# Patient Record
Sex: FEMALE | Race: WHITE | Employment: OTHER | URBAN - NONMETROPOLITAN AREA
[De-identification: names, ages, dates, MRNs, and addresses within clinical notes are randomized per-mention and may not be internally consistent; named-entity substitution may affect disease eponyms.]

---

## 2017-10-05 ENCOUNTER — HOSPITAL ENCOUNTER (EMERGENCY)
Age: 77
Discharge: HOME | End: 2017-10-05
Payer: MEDICARE

## 2017-10-05 VITALS
RESPIRATION RATE: 20 BRPM | HEART RATE: 78 BPM | OXYGEN SATURATION: 98 % | SYSTOLIC BLOOD PRESSURE: 134 MMHG | TEMPERATURE: 97.8 F | DIASTOLIC BLOOD PRESSURE: 74 MMHG

## 2017-10-05 DIAGNOSIS — S76.302A HAMSTRING INJURY, LEFT, INITIAL ENCOUNTER: Primary | ICD-10-CM

## 2017-10-05 PROCEDURE — 99202 OFFICE O/P NEW SF 15 MIN: CPT | Performed by: NURSE PRACTITIONER

## 2017-10-05 PROCEDURE — 99212 OFFICE O/P EST SF 10 MIN: CPT

## 2017-10-05 ASSESSMENT — PAIN DESCRIPTION - ORIENTATION: ORIENTATION: LEFT;UPPER

## 2017-10-05 ASSESSMENT — PAIN SCALES - GENERAL: PAINLEVEL_OUTOF10: 9

## 2017-10-05 ASSESSMENT — PAIN DESCRIPTION - DESCRIPTORS: DESCRIPTORS: SHARP;SQUEEZING;ACHING

## 2017-10-05 ASSESSMENT — PAIN DESCRIPTION - PAIN TYPE: TYPE: ACUTE PAIN

## 2017-10-05 ASSESSMENT — PAIN DESCRIPTION - LOCATION: LOCATION: LEG

## 2017-10-05 NOTE — ED PROVIDER NOTES
[address]  Urgent Care Encounter       CHIEF COMPLAINT       Chief Complaint   Patient presents with    Leg Injury     today at 1100       Nurses Notes reviewed and I agree except as noted in the HPI. HISTORY OF PRESENT ILLNESS   Dior Colon is a 65 y.o. female who presents To the urgent care center complaining of pain to the left upper leg. Patient describes the pain as squeezing aching sharp states she has pain 9 out of 10 with movement the patient states that she fell today while carrying laundry and a piece a laundry fell and the patient slipped on it hurting her posterior left leg today. This happened in the kitchen of her home around 11 AM which would've been approximately one and one half hours prior to arrival.    Patient is a 65 y.o. female presenting with leg problem. The history is provided by the patient. Leg Injury   Location:  Leg  Time since incident:  1 hour  Injury: yes    Mechanism of injury comment:  Nba Minor in the kitchen today  Leg location:  L upper leg  Pain details:     Quality:  Sharp and aching (squeezing)    Radiates to:  Does not radiate    Severity:  Severe    Onset quality:  Sudden    Duration:  1 day    Timing:  Constant    Progression:  Unchanged  Chronicity:  New  Associated symptoms: decreased ROM    Associated symptoms: no neck pain        REVIEW OF SYSTEMS     Review of Systems   Constitutional: Positive for activity change. Musculoskeletal: Positive for arthralgias, gait problem and myalgias. Negative for neck pain and neck stiffness. Left leg       PAST MEDICAL HISTORY         Diagnosis Date    Anti-phospholipid antibody syndrome ([facility]     Cancer ([facility]     uterine        SURGICAL HISTORY     Patient  has a past surgical history that includes Hysterectomy; Foot surgery; and eye surgery (Bilateral).     CURRENT MEDICATIONS       Discharge Medication List as of 10/5/2017  1:03 PM      CONTINUE these medications which have NOT CHANGED Details   B Complex Vitamins (VITAMIN B COMPLEX PO) Take by mouthHistorical Med      Multiple Vitamins-Minerals (OCUVITE PRESERVISION PO) Take by mouthHistorical Med      TRAZODONE HCL PO Take 50 mg by mouthHistorical Med      prednisoLONE acetate (PRED FORTE) 1 % ophthalmic suspension 1 drop 4 times daily. multivitamin (THERAGRAN) per tablet Take 1 tablet by mouth daily. FISH OIL by Does not apply route. Calcium Carbonate-Vitamin D (CALCIUM + D) 600-200 MG-UNIT TABS Take  by mouth. aspirin 325 MG tablet Take 325 mg by mouth daily. ALLERGIES     Patient is has No Known Allergies. Patients   Immunization History   Administered Date(s) Administered    Td 09/24/2012       FAMILY HISTORY     Patient's family history is not on file. SOCIAL HISTORY     Patient  reports that she has never smoked. She does not have any smokeless tobacco history on file. She reports that she does not use illicit drugs. PHYSICAL EXAM     ED TRIAGE VITALS  BP: 134/74, Temp: 97.8 °F (36.6 °C), Pulse: 78, Resp: 20, SpO2: 98 %,Estimated body mass index is 28.13 kg/(m^2) as calculated from the following:    Height as of 9/24/12: 5' 8\" (1.727 m). Weight as of 9/24/12: 185 lb (83.9 kg). ,No LMP recorded. Patient is postmenopausal.    Physical Exam   Constitutional: She is oriented to person, place, and time. Vital signs are normal. She appears well-developed and well-nourished. She is cooperative. Non-toxic appearance. She does not have a sickly appearance. She does not appear ill. No distress. HENT:   Head: Normocephalic. Right Ear: External ear normal.   Left Ear: External ear normal.   Nose: Nose normal.   Neck: Normal range of motion and full passive range of motion without pain. Neck supple. Cardiovascular: Normal rate. Pulmonary/Chest: Effort normal.   Musculoskeletal: She exhibits edema and tenderness. Left upper leg: She exhibits tenderness.         Legs:  Patient was able

## 2017-10-05 NOTE — ED AVS SNAPSHOT
your survey in the envelope provided. We hope you will choose us in the future for your healthcare needs. Patient Information     Patient Name CHAVA Sosa 1940      Care Provided at:     Name Address Phone       [address] [phone]            Your Visit    Here you will find information about your visit, including the reason for your visit. Please take this sheet with you when you visit your doctor or other health care provider in the future. It will help determine the best possible medical care for you at that time. If you have any questions once you leave the hospital, please call the department phone number listed below. Diagnoses this visit     Your diagnosis was HAMSTRING INJURY, LEFT, INITIAL ENCOUNTER. Visit Information     Date of Visit Department Dept Phone    10/5/2017 [address] [place] [address] [phone]      You were seen by     You were seen by Yina Escalona NP. Follow-up Appointments    Below is a list of your follow-up and future appointments. This may not be a complete list as you may have made appointments directly with providers that we are not aware of or your providers may have made some for you. Please call your providers to confirm appointments. It is important to keep your appointments. Please bring your current insurance card, photo ID, co-pay, and all medication bottles to your appointment. If self-pay, payment is expected at the time of service. Follow-up Information     Follow up with Jo Esposito MD In 1 week.     Specialty:  Internal Medicine    Why:  For recheck and further evaluation and management, If symptoms worsen go to the Emergency Department    Contact information:    [address]  [phone]        Preventive Care        Date Due 4. Enter your Social Security Number ([id number]) and Date of Birth (mm/dd/yyyy) as indicated and click Submit. You will be taken to the next sign-up page. 5. Create a EasyQasat ID. This will be your EasyQasat login ID and cannot be changed, so think of one that is secure and easy to remember. 6. Create a EasyQasat password. You can change your password at any time. 7. Enter your Password Reset Question and Answer. This can be used at a later time if you forget your password. 8. Enter your e-mail address. You will receive e-mail notification when new information is available in [address] 9. Click Sign Up. You can now view your medical record. Additional Information  If you have questions, please contact the physician practice where you receive care. Remember, [employer]Drive[employer] is NOT to be used for urgent needs. For medical emergencies, dial 911. For questions regarding your FreeDrivehart account call [phone]. If you have a clinical question, please call your doctor's office. View your information online  ? Review your current list of  medications, immunization, and allergies. ? Review your future test results online . ? Review your discharge instructions provided by your caregivers at discharge    Certain functionality such as prescription refills, scheduling appointments or sending messages to your provider are not activated if your provider does not use [employer] [facility] in his/her office    For questions regarding your MyChart account call [phone]. If you have a clinical question, please call your doctor's office. The information on all pages of the After Visit Summary has been reviewed with me, the patient and/or responsible adult, by my health care provider(s). I had the opportunity to ask questions regarding this information. I understand I should dispose of my armband safely at home to protect my health information.  A complete copy of the After Visit Summary has been given to me, the patient and/or responsible adult. Patient Signature/Responsible Adult: ___________________________________    Nurse Signature: ___________________________________  Resident/MLP Signature: ___________________________________  Attending Signature: ___________________________________    Date:____________Time:____________              More Information           Hamstring Strain: Care Instructions  Your Care Instructions    A hamstring strain happens when you overstretch, or pull, the muscles that run down the back of your thigh. It can happen when you exercise or lift something or if you're injured in an accident. You may feel pain and tenderness that's worse when you move your injured leg. The back of your thigh may be swollen and bruised. If you have a bad strain, you may not be able to move your leg normally. While a minor strain often heals well with rest and other treatment, a severe strain may require medical treatment. If a severe strain isn't treated, you may have long-lasting problems. Follow-up care is a key part of your treatment and safety. Be sure to make and go to all appointments, and call your doctor if you are having problems. It's also a good idea to know your test results and keep a list of the medicines you take. How can you care for yourself at home? · Rest your injured leg. Don't put weight on it for a day or two. If your doctor advises you to, use crutches to rest the leg. · Put ice or a cold pack on the back of your thigh for 10 to 20 minutes at a time to stop swelling. Put a thin cloth between the ice and your skin. · Wrapping your thigh with an elastic bandage (such as an Ace wrap), will help decrease swelling. Don't wrap it too tightly, since this can cause more swelling below the affected area. · Elevate your thigh on pillows while applying ice and anytime you are sitting or lying down. · Ask your doctor if you can take an over-the-counter pain medicine, such as acetaminophen (Tylenol), ibuprofen (Advil, Motrin), or naproxen (Aleve). Be safe with medicines. Read and follow all instructions on the label. · Don't do anything that makes the pain worse. Return to your usual level of activity slowly. When should you call for help? Call your doctor now or seek immediate medical care if:  · You have severe or increasing pain. · You have tingling, weakness, or numbness in your injured leg. · You cannot move your injured leg. Watch closely for changes in your health, and be sure to contact your doctor if:  · You do not get better as expected. Where can you learn more? Go to https://ScaleMP.Bambuser. org and sign in to your enMarkit account. Enter X729 in the GivU box to learn more about \"Hamstring Strain: Care Instructions. \"     If you do not have an account, please click on the \"Sign Up Now\" link. Current as of: March 21, 2017  Content Version: 11.3  © 7854-6726 [employer]. Care instructions adapted under license by [facility] [employer] [facility] If you have questions about a medical condition or this instruction, always ask your healthcare professional. [address] any warranty or liability for your use of this information.

## 2019-07-26 ENCOUNTER — HOSPITAL ENCOUNTER (EMERGENCY)
Age: 79
Discharge: HOME | End: 2019-07-26
Payer: MEDICARE

## 2019-07-26 VITALS
DIASTOLIC BLOOD PRESSURE: 67 MMHG | WEIGHT: 179 LBS | OXYGEN SATURATION: 96 % | HEIGHT: 68 IN | SYSTOLIC BLOOD PRESSURE: 136 MMHG | BODY MASS INDEX: 27.13 KG/M2 | HEART RATE: 79 BPM | TEMPERATURE: 97.2 F | RESPIRATION RATE: 18 BRPM

## 2019-07-26 DIAGNOSIS — S11.90XA OPEN WOUND OF NECK, INITIAL ENCOUNTER: Primary | ICD-10-CM

## 2019-07-26 PROCEDURE — 99212 OFFICE O/P EST SF 10 MIN: CPT

## 2019-07-26 PROCEDURE — 99213 OFFICE O/P EST LOW 20 MIN: CPT | Performed by: NURSE PRACTITIONER

## 2019-07-26 RX ORDER — CLOPIDOGREL BISULFATE 75 MG/1
75 TABLET ORAL DAILY
COMMUNITY

## 2019-07-26 RX ORDER — CYCLOSPORINE 0.5 MG/ML
1 EMULSION OPHTHALMIC 2 TIMES DAILY
COMMUNITY

## 2019-07-26 ASSESSMENT — PAIN DESCRIPTION - LOCATION: LOCATION: NECK

## 2019-07-26 ASSESSMENT — ENCOUNTER SYMPTOMS
SHORTNESS OF BREATH: 0
NAUSEA: 0
VOMITING: 0

## 2019-07-26 ASSESSMENT — PAIN DESCRIPTION - FREQUENCY: FREQUENCY: INTERMITTENT

## 2019-07-26 ASSESSMENT — PAIN - FUNCTIONAL ASSESSMENT: PAIN_FUNCTIONAL_ASSESSMENT: ACTIVITIES ARE NOT PREVENTED

## 2019-07-26 ASSESSMENT — PAIN DESCRIPTION - ORIENTATION: ORIENTATION: LEFT

## 2019-07-26 ASSESSMENT — PAIN DESCRIPTION - ONSET: ONSET: GRADUAL

## 2019-07-26 ASSESSMENT — PAIN DESCRIPTION - PROGRESSION: CLINICAL_PROGRESSION: GRADUALLY WORSENING

## 2019-07-26 ASSESSMENT — PAIN SCALES - GENERAL: PAINLEVEL_OUTOF10: 2

## 2019-07-26 ASSESSMENT — PAIN DESCRIPTION - PAIN TYPE: TYPE: ACUTE PAIN

## 2019-07-26 ASSESSMENT — PAIN DESCRIPTION - DESCRIPTORS: DESCRIPTORS: ACHING

## 2019-07-26 NOTE — ED PROVIDER NOTES
drop 2 times dailyHistorical Med      TRAZODONE HCL PO Take 50 mg by mouthHistorical Med      prednisoLONE acetate (PRED FORTE) 1 % ophthalmic suspension 1 drop 4 times daily. multivitamin (THERAGRAN) per tablet Take 1 tablet by mouth daily. FISH OIL by Does not apply route. Calcium Carbonate-Vitamin D (CALCIUM + D) 600-200 MG-UNIT TABS Take  by mouth. aspirin 325 MG tablet Take 325 mg by mouth daily. ALLERGIES     Patient is is allergic to toradol [ketorolac tromethamine]. Patients   Immunization History   Administered Date(s) Administered    Td, unspecified formulation 09/24/2012       FAMILY HISTORY     Patient's family history is not on file. SOCIAL HISTORY     Patient  reports that she has never smoked. She has never used smokeless tobacco. She reports that she drinks about 1.0 standard drinks of alcohol per week. She reports that she does not use drugs. PHYSICAL EXAM     ED TRIAGE VITALS  BP: 136/67, Temp: 97.2 °F (36.2 °C), Pulse: 79, Resp: 18, SpO2: 96 %,Estimated body mass index is 27.22 kg/m² as calculated from the following:    Height as of this encounter: 5' 8\" (1.727 m). Weight as of this encounter: 179 lb (81.2 kg). ,No LMP recorded. Patient is postmenopausal.    Physical Exam   Constitutional: She is oriented to person, place, and time. She appears well-developed and well-nourished. She is cooperative. No distress. HENT:   Head: Normocephalic and atraumatic. Neck: Full passive range of motion without pain. Neck supple. Nonhealing wound to the left side of the neck. Scabbing is present as well as scant fresh bleeding. There is scaly border noted around to the 10 to 11 o'clock position. The area is slightly tender to palpation. No surrounding erythema. Cardiovascular: Normal rate, regular rhythm, S1 normal and S2 normal.   Murmur heard. Systolic murmur is present with a grade of 2/6.   Pulmonary/Chest: Effort normal and breath sounds normal. No respiratory distress. Neurological: She is alert and oriented to person, place, and time. Skin: Skin is warm and dry. Psychiatric: She has a normal mood and affect. Her speech is normal and behavior is normal.   Nursing note and vitals reviewed. DIAGNOSTIC RESULTS     Labs:No results found for this visit on 07/26/19. IMAGING:    No orders to display         URGENT CARE COURSE:     Vitals:    07/26/19 0925   BP: 136/67   Pulse: 79   Resp: 18   Temp: 97.2 °F (36.2 °C)   TempSrc: Temporal   SpO2: 96%   Weight: 179 lb (81.2 kg)   Height: 5' 8\" (1.727 m)       Medications - No data to display         PROCEDURES:  None    FINAL IMPRESSION      1. Open wound of neck, initial encounter          DISPOSITION/ PLAN     Patient presents with a nonhealing, open wound to the left side of her neck. Wound is been has not for the past 2 weeks. Wound may have slightly improved with antibiotic ointment however was bleeding this morning. There is some scabbing noted. We will treat with Bactroban ointment. Patient was encouraged to follow-up with her family doctor next week for a wound check. Concern for possible malignancy due to the nonhealing nature of the wound. The wound may need a biopsy. This can be done at the PCP office or patient can be referred to dermatology if needed. Further instructions were outlined verbally and in the patient's discharge instructions. All the patient's questions were answered. The patient/parent agreed with the plan and was discharged from the [facility] in good condition. PATIENT REFERRED TO:  Jay Lieberman MD  [address] [address]      DISCHARGE MEDICATIONS:  Discharge Medication List as of 7/26/2019  9:54 AM      START taking these medications    Details   mupirocin (BACTROBAN) 2 % ointment Apply topically 3 times daily x 7 days. , Disp-15 g, R-0, Normal             Discharge Medication List as of 7/26/2019  9:54 AM      STOP taking these medications       B Complex Vitamins (VITAMIN B COMPLEX PO) Comments:   Reason for Stopping:         Multiple Vitamins-Minerals (OCUVITE PRESERVISION PO) Comments:   Reason for Stopping:               Discharge Medication List as of 7/26/2019  9:54 AM          VERO Bermeo CNP    (Please note that portions of this note were completed with a voice recognition program. Efforts were made to edit the dictations but occasionally words are mis-transcribed.)         VERO Bermeo CNP  07/26/19 1045

## 2019-10-14 ENCOUNTER — HOSPITAL ENCOUNTER (EMERGENCY)
Age: 79
Discharge: HOME | End: 2019-10-14
Payer: MEDICARE

## 2019-10-14 VITALS
HEIGHT: 68 IN | TEMPERATURE: 98.2 F | WEIGHT: 180 LBS | BODY MASS INDEX: 27.28 KG/M2 | RESPIRATION RATE: 16 BRPM | OXYGEN SATURATION: 97 % | DIASTOLIC BLOOD PRESSURE: 76 MMHG | SYSTOLIC BLOOD PRESSURE: 138 MMHG | HEART RATE: 76 BPM

## 2019-10-14 DIAGNOSIS — R30.0 DYSURIA: Primary | ICD-10-CM

## 2019-10-14 DIAGNOSIS — N30.01 ACUTE CYSTITIS WITH HEMATURIA: ICD-10-CM

## 2019-10-14 LAB
BILIRUBIN URINE: NEGATIVE
BLOOD, URINE: ABNORMAL
CHARACTER, URINE: ABNORMAL
COLOR: ABNORMAL
GLUCOSE, URINE: NEGATIVE MG/DL
KETONES, URINE: NEGATIVE
LEUKOCYTES, UA: ABNORMAL
NITRITE, URINE: NEGATIVE
PH UA: 7 (ref 5–9)
PROTEIN UA: 100 MG/DL
REFLEX TO URINE C & S: ABNORMAL
SPECIFIC GRAVITY UA: 1.01 (ref 1–1.03)
UROBILINOGEN, URINE: 0.2 EU/DL (ref 0–1)

## 2019-10-14 PROCEDURE — 99213 OFFICE O/P EST LOW 20 MIN: CPT | Performed by: NURSE PRACTITIONER

## 2019-10-14 PROCEDURE — 99213 OFFICE O/P EST LOW 20 MIN: CPT

## 2019-10-14 PROCEDURE — 87086 URINE CULTURE/COLONY COUNT: CPT

## 2019-10-14 PROCEDURE — 81003 URINALYSIS AUTO W/O SCOPE: CPT

## 2019-10-14 RX ORDER — NITROFURANTOIN 25; 75 MG/1; MG/1
100 CAPSULE ORAL 2 TIMES DAILY
Qty: 20 CAPSULE | Refills: 0 | Status: SHIPPED | OUTPATIENT
Start: 2019-10-14 | End: 2019-10-24

## 2019-10-14 RX ORDER — PHENAZOPYRIDINE HYDROCHLORIDE 100 MG/1
100 TABLET, FILM COATED ORAL 3 TIMES DAILY PRN
Qty: 9 TABLET | Refills: 0 | Status: SHIPPED | OUTPATIENT
Start: 2019-10-14 | End: 2019-10-17

## 2019-10-14 ASSESSMENT — ENCOUNTER SYMPTOMS
COLOR CHANGE: 0
BACK PAIN: 0

## 2019-10-16 LAB
ORGANISM: ABNORMAL
URINE CULTURE REFLEX: ABNORMAL

## 2019-11-06 ENCOUNTER — HOSPITAL ENCOUNTER (EMERGENCY)
Age: 79
Discharge: HOME | End: 2019-11-06
Attending: NURSE PRACTITIONER
Payer: MEDICARE

## 2019-11-06 VITALS
HEIGHT: 68 IN | DIASTOLIC BLOOD PRESSURE: 71 MMHG | RESPIRATION RATE: 16 BRPM | BODY MASS INDEX: 27.28 KG/M2 | OXYGEN SATURATION: 95 % | SYSTOLIC BLOOD PRESSURE: 115 MMHG | HEART RATE: 89 BPM | WEIGHT: 180 LBS | TEMPERATURE: 97 F

## 2019-11-06 DIAGNOSIS — R31.29 OTHER MICROSCOPIC HEMATURIA: ICD-10-CM

## 2019-11-06 DIAGNOSIS — N39.0 URINARY TRACT INFECTION IN FEMALE: Primary | ICD-10-CM

## 2019-11-06 LAB
BILIRUBIN URINE: NEGATIVE
BLOOD, URINE: ABNORMAL
CHARACTER, URINE: CLEAR
COLOR: YELLOW
GLUCOSE, URINE: NEGATIVE MG/DL
KETONES, URINE: NEGATIVE
LEUKOCYTES, UA: ABNORMAL
NITRITE, URINE: NEGATIVE
PH UA: 7.5 (ref 5–9)
PROTEIN UA: NEGATIVE MG/DL
REFLEX TO URINE C & S: ABNORMAL
SPECIFIC GRAVITY UA: 1.01 (ref 1–1.03)
UROBILINOGEN, URINE: 0.2 EU/DL (ref 0–1)

## 2019-11-06 PROCEDURE — 99213 OFFICE O/P EST LOW 20 MIN: CPT | Performed by: NURSE PRACTITIONER

## 2019-11-06 PROCEDURE — 81003 URINALYSIS AUTO W/O SCOPE: CPT

## 2019-11-06 PROCEDURE — 87077 CULTURE AEROBIC IDENTIFY: CPT

## 2019-11-06 PROCEDURE — 87186 SC STD MICRODIL/AGAR DIL: CPT

## 2019-11-06 PROCEDURE — 87086 URINE CULTURE/COLONY COUNT: CPT

## 2019-11-06 PROCEDURE — 99214 OFFICE O/P EST MOD 30 MIN: CPT

## 2019-11-06 RX ORDER — NITROFURANTOIN 25; 75 MG/1; MG/1
100 CAPSULE ORAL 2 TIMES DAILY
Qty: 14 CAPSULE | Refills: 0 | Status: SHIPPED | OUTPATIENT
Start: 2019-11-06 | End: 2019-11-13

## 2019-11-06 ASSESSMENT — ENCOUNTER SYMPTOMS
ABDOMINAL PAIN: 1
SHORTNESS OF BREATH: 0
TROUBLE SWALLOWING: 0

## 2019-11-08 LAB
ORGANISM: ABNORMAL
URINE CULTURE REFLEX: ABNORMAL

## 2019-11-23 ENCOUNTER — HOSPITAL ENCOUNTER (EMERGENCY)
Age: 79
Discharge: HOME | End: 2019-11-23
Payer: MEDICARE

## 2019-11-23 VITALS
BODY MASS INDEX: 27.28 KG/M2 | HEART RATE: 85 BPM | HEIGHT: 68 IN | OXYGEN SATURATION: 98 % | RESPIRATION RATE: 18 BRPM | DIASTOLIC BLOOD PRESSURE: 49 MMHG | SYSTOLIC BLOOD PRESSURE: 93 MMHG | TEMPERATURE: 97.1 F | WEIGHT: 180 LBS

## 2019-11-23 DIAGNOSIS — N30.01 ACUTE CYSTITIS WITH HEMATURIA: ICD-10-CM

## 2019-11-23 DIAGNOSIS — R30.0 DYSURIA: Primary | ICD-10-CM

## 2019-11-23 LAB
BILIRUBIN URINE: ABNORMAL
BLOOD, URINE: ABNORMAL
CHARACTER, URINE: ABNORMAL
COLOR: ABNORMAL
GLUCOSE, URINE: NEGATIVE MG/DL
KETONES, URINE: NEGATIVE
LEUKOCYTES, UA: ABNORMAL
NITRITE, URINE: NEGATIVE
PH UA: 7 (ref 5–9)
PROTEIN UA: 100 MG/DL
REFLEX TO URINE C & S: ABNORMAL
SPECIFIC GRAVITY UA: 1.01 (ref 1–1.03)
UROBILINOGEN, URINE: 0.2 EU/DL (ref 0–1)

## 2019-11-23 PROCEDURE — 99213 OFFICE O/P EST LOW 20 MIN: CPT

## 2019-11-23 PROCEDURE — 87086 URINE CULTURE/COLONY COUNT: CPT

## 2019-11-23 PROCEDURE — 99213 OFFICE O/P EST LOW 20 MIN: CPT | Performed by: NURSE PRACTITIONER

## 2019-11-23 PROCEDURE — 81003 URINALYSIS AUTO W/O SCOPE: CPT

## 2019-11-23 PROCEDURE — 87077 CULTURE AEROBIC IDENTIFY: CPT

## 2019-11-23 PROCEDURE — 87186 SC STD MICRODIL/AGAR DIL: CPT

## 2019-11-23 RX ORDER — NITROFURANTOIN 25; 75 MG/1; MG/1
100 CAPSULE ORAL 2 TIMES DAILY
Qty: 20 CAPSULE | Refills: 0 | Status: SHIPPED | OUTPATIENT
Start: 2019-11-23 | End: 2019-12-03

## 2019-11-23 RX ORDER — PHENAZOPYRIDINE HYDROCHLORIDE 100 MG/1
100 TABLET, FILM COATED ORAL 3 TIMES DAILY PRN
Qty: 9 TABLET | Refills: 0 | Status: SHIPPED | OUTPATIENT
Start: 2019-11-23 | End: 2019-11-26

## 2019-11-23 ASSESSMENT — ENCOUNTER SYMPTOMS: COLOR CHANGE: 0

## 2019-11-25 LAB
ORGANISM: ABNORMAL
URINE CULTURE REFLEX: ABNORMAL

## 2021-04-16 ENCOUNTER — HOSPITAL ENCOUNTER (EMERGENCY)
Age: 81
Discharge: HOME | End: 2021-04-16
Payer: MEDICARE

## 2021-04-16 VITALS
DIASTOLIC BLOOD PRESSURE: 75 MMHG | SYSTOLIC BLOOD PRESSURE: 121 MMHG | OXYGEN SATURATION: 98 % | TEMPERATURE: 98.3 F | RESPIRATION RATE: 16 BRPM | HEART RATE: 78 BPM

## 2021-04-16 DIAGNOSIS — N39.0 URINARY TRACT INFECTION IN FEMALE: Primary | ICD-10-CM

## 2021-04-16 LAB
BILIRUBIN URINE: NEGATIVE
BLOOD, URINE: ABNORMAL
CHARACTER, URINE: CLEAR
COLOR: YELLOW
GLUCOSE URINE: NEGATIVE MG/DL
KETONES, URINE: NEGATIVE
LEUKOCYTE ESTERASE, URINE: ABNORMAL
NITRITE, URINE: NEGATIVE
PH UA: 6.5 (ref 5–9)
PROTEIN UA: 30 MG/DL
SPECIFIC GRAVITY, URINE: 1.02 (ref 1–1.03)
UROBILINOGEN, URINE: 1 EU/DL (ref 0.2–1)

## 2021-04-16 PROCEDURE — 99214 OFFICE O/P EST MOD 30 MIN: CPT | Performed by: NURSE PRACTITIONER

## 2021-04-16 PROCEDURE — 99213 OFFICE O/P EST LOW 20 MIN: CPT

## 2021-04-16 PROCEDURE — 81003 URINALYSIS AUTO W/O SCOPE: CPT

## 2021-04-16 RX ORDER — SULFAMETHOXAZOLE AND TRIMETHOPRIM 800; 160 MG/1; MG/1
1 TABLET ORAL 2 TIMES DAILY
Qty: 10 TABLET | Refills: 0 | Status: SHIPPED | OUTPATIENT
Start: 2021-04-16 | End: 2021-04-21

## 2021-04-16 RX ORDER — PHENAZOPYRIDINE HYDROCHLORIDE 100 MG/1
100 TABLET, FILM COATED ORAL 3 TIMES DAILY PRN
Qty: 9 TABLET | Refills: 0 | Status: SHIPPED | OUTPATIENT
Start: 2021-04-16 | End: 2021-04-19

## 2021-04-16 ASSESSMENT — ENCOUNTER SYMPTOMS
VOMITING: 0
NAUSEA: 0
SHORTNESS OF BREATH: 0
TROUBLE SWALLOWING: 0
ABDOMINAL PAIN: 0
BACK PAIN: 0

## 2021-04-16 NOTE — ED TRIAGE NOTES
Valentin Gordon arrives to room with complaint of urinary frequency and burning. Antoinette's symptoms started this am days ago.

## 2021-04-16 NOTE — ED PROVIDER NOTES
[address]       Chief Complaint   Patient presents with    Dysuria       Nurses Notes reviewed and I agree except as noted in the HPI. HISTORY OF PRESENT ILLNESS   Rina Gamboa is a 79 y.o. female who presents with complaints of possible UTI. Onset of symptoms today, worsening. Patient complains of urinary frequency and dysuria. Denies fever, abdominal pain, or back pain. History of UTI, not recurrent. No changes in diet. No new medications. No treatment prior to arrival.    REVIEW OF SYSTEMS     Review of Systems   Constitutional: Negative for fever. HENT: Negative for trouble swallowing. Respiratory: Negative for shortness of breath. Cardiovascular: Negative for chest pain. Gastrointestinal: Negative for abdominal pain, nausea and vomiting. Genitourinary: Positive for dysuria and frequency. Negative for decreased urine volume, difficulty urinating, flank pain, genital sores, hematuria, menstrual problem, pelvic pain, urgency, vaginal bleeding, vaginal discharge and vaginal pain. Musculoskeletal: Negative for back pain, neck pain and neck stiffness. Skin: Negative for rash. Neurological: Negative for headaches. Hematological: Negative for adenopathy. Psychiatric/Behavioral: Negative for sleep disturbance. PAST MEDICAL HISTORY         Diagnosis Date    Anti-phospholipid antibody syndrome ([facility] [address]     Cancer ([facility] [address] [facility]     uterine        SURGICAL HISTORY     Patient  has a past surgical history that includes Hysterectomy; Foot surgery; and eye surgery (Bilateral).     CURRENT MEDICATIONS       Discharge Medication List as of 4/16/2021  8:54 AM      CONTINUE these medications which have NOT CHANGED    Details   clopidogrel (PLAVIX) 75 MG tablet Take 75 mg by mouth dailyHistorical Med      cycloSPORINE (RESTASIS) 0.05 % ophthalmic emulsion 1 drop 2 times dailyHistorical Med      TRAZODONE HCL PO Take 50 mg by mouthHistorical Med      prednisoLONE acetate (PRED FORTE) 1 % ophthalmic suspension 1 drop 4 times daily. multivitamin (THERAGRAN) per tablet Take 1 tablet by mouth daily. Calcium Carbonate-Vitamin D (CALCIUM + D) 600-200 MG-UNIT TABS Take  by mouth. aspirin 325 MG tablet Take 325 mg by mouth daily. ALLERGIES     Patient is is allergic to toradol [ketorolac tromethamine]. FAMILY HISTORY     Patient'sfamily history is not on file. SOCIAL HISTORY     Patient  reports that she has never smoked. She has never used smokeless tobacco. She reports current alcohol use of about 1.0 standard drinks of alcohol per week. She reports that she does not use drugs. PHYSICAL EXAM     ED TRIAGE VITALS  BP: 121/75, Temp: 98.3 °F (36.8 °C), Pulse: 78, Resp: 16, SpO2: 98 %  Physical Exam  Vitals signs and nursing note reviewed. Constitutional:       General: She is not in acute distress. Appearance: Normal appearance. She is well-developed. She is not ill-appearing, toxic-appearing or diaphoretic. HENT:      Head: Normocephalic and atraumatic. Eyes:      General: No scleral icterus. Conjunctiva/sclera: Conjunctivae normal.   Pulmonary:      Effort: Pulmonary effort is normal. No respiratory distress. Abdominal:      General: There is no distension. Palpations: Abdomen is soft. Tenderness: There is no abdominal tenderness. There is no right CVA tenderness or left CVA tenderness. Musculoskeletal:      Lumbar back: Normal.   Skin:     General: Skin is warm and dry. Capillary Refill: Capillary refill takes less than 2 seconds. Coloration: Skin is not jaundiced. Findings: No rash. Neurological:      Mental Status: She is alert and oriented to person, place, and time. Psychiatric:         Mood and Affect: Mood normal.         Behavior: Behavior normal. Behavior is cooperative.          DIAGNOSTIC RESULTS   Labs:   Results for orders placed or performed during the hospital encounter of 04/16/21   Urinalysis   Result Value Ref Range    Glucose, Ur Negative NEGATIVE mg/dl    Bilirubin Urine Negative NEGATIVE    Ketones, Urine Negative NEGATIVE    Specific Gravity, Urine 1.020 1.002 - 1.030    Blood, Urine Moderate (A) NEGATIVE    pH, UA 6.50 5.0 - 9.0    Protein, UA 30 (A) NEGATIVE mg/dl    Urobilinogen, Urine 1.00 0.2 - 1.0 eu/dl    Nitrite, Urine Negative NEGATIVE    Leukocyte Esterase, Urine Large (A) NEGATIVE    Color, UA Yellow STRAW-YELLOW    Character, Urine Clear CLEAR-SL CLOUD       IMAGING:  No orders to display     URGENT CARE COURSE:     Vitals:    04/16/21 0850   BP: 121/75   Pulse: 78   Resp: 16   Temp: 98.3 °F (36.8 °C)   TempSrc: Temporal   SpO2: 98%       Medications - No data to display  PROCEDURES:  None  FINALIMPRESSION      1. Urinary tract infection in female        DISPOSITION/PLAN   DISPOSITION Decision To Discharge 04/16/2021 08:52:50 AM  UA consistent with UTI, culture pending. Medications as prescribed, increase fluids. If symptoms worsen return or go to ER. PATIENT REFERRED TO:  Dario Nunez MD  [address] [phone]    In 3 days  Follow up as needed. Medications as precribed. Increase fluids. If worse go to ER.     DISCHARGE MEDICATIONS:  Discharge Medication List as of 4/16/2021  8:54 AM      START taking these medications    Details   sulfamethoxazole-trimethoprim (BACTRIM DS) 800-160 MG per tablet Take 1 tablet by mouth 2 times daily for 5 days, Disp-10 tablet, R-0Normal      phenazopyridine (PYRIDIUM) 100 MG tablet Take 1 tablet by mouth 3 times daily as needed for Pain, Disp-9 tablet, R-0Normal           Discharge Medication List as of 4/16/2021  8:54 AM          Valente Hearing, VERO - CNP         Montgomery Hearing, APRN - CNP  04/16/21 9611

## 2021-05-20 ENCOUNTER — HOSPITAL ENCOUNTER (EMERGENCY)
Age: 81
Discharge: HOME | End: 2021-05-20
Payer: MEDICARE

## 2021-05-20 VITALS
DIASTOLIC BLOOD PRESSURE: 68 MMHG | HEART RATE: 84 BPM | OXYGEN SATURATION: 95 % | WEIGHT: 186 LBS | TEMPERATURE: 97.5 F | RESPIRATION RATE: 16 BRPM | SYSTOLIC BLOOD PRESSURE: 124 MMHG | BODY MASS INDEX: 28.19 KG/M2 | HEIGHT: 68 IN

## 2021-05-20 DIAGNOSIS — S81.812A NONINFECTED SKIN TEAR OF LEFT LOWER EXTREMITY, INITIAL ENCOUNTER: Primary | ICD-10-CM

## 2021-05-20 PROCEDURE — 99213 OFFICE O/P EST LOW 20 MIN: CPT

## 2021-05-20 PROCEDURE — 99213 OFFICE O/P EST LOW 20 MIN: CPT | Performed by: NURSE PRACTITIONER

## 2021-05-20 RX ORDER — DIAPER,BRIEF,INFANT-TODD,DISP
EACH MISCELLANEOUS
Qty: 30 G | Refills: 1 | Status: SHIPPED | OUTPATIENT
Start: 2021-05-20 | End: 2021-05-30

## 2021-05-20 RX ORDER — CEPHALEXIN 500 MG/1
500 CAPSULE ORAL 3 TIMES DAILY
Qty: 30 CAPSULE | Refills: 0 | Status: SHIPPED | OUTPATIENT
Start: 2021-05-20 | End: 2021-05-30

## 2021-05-20 RX ORDER — HYDROCHLOROTHIAZIDE 12.5 MG/1
12.5 CAPSULE, GELATIN COATED ORAL DAILY
COMMUNITY

## 2021-05-20 ASSESSMENT — ENCOUNTER SYMPTOMS
SHORTNESS OF BREATH: 0
NAUSEA: 0
COUGH: 0
VOMITING: 0
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
## 2021-05-20 NOTE — ED NOTES
Pt requesting to do dressing at home after her shower. Pt instructed on dressing care with pt verbalization of understanding. Pt and spouse given discharge instructions and verbalize understanding.       Hershal Collet, RN  05/20/21 7348

## 2021-05-20 NOTE — ED PROVIDER NOTES
[address]  Urgent Care Encounter       CHIEF COMPLAINT       Chief Complaint   Patient presents with    Other     abrasion left lower leg from bike ride today around 1300       Nurses Notes reviewed and I agree except as noted in the HPI. HISTORY OF PRESENT ILLNESS   Marisa Stallworth is a 79 y.o. female who presents for evaluation of a wound to the left lower leg. Patient states that roughly 1 hour before arrival to urgent care she was riding her exercise bike when her foot slipped off of the pedal, causing her leg to go directly into the pedal.  States that she was able to get the bleeding to stop at home but wanted to be evaluated due to the wound on her leg. She denies any numbness or tingling into the lower extremity. States that she is up-to-date on her tetanus. The history is provided by the patient. REVIEW OF SYSTEMS     Review of Systems   Constitutional: Negative for chills and fever. Respiratory: Negative for cough and shortness of breath. Cardiovascular: Negative for chest pain. Gastrointestinal: Negative for nausea and vomiting. Musculoskeletal: Negative for arthralgias and joint swelling. Skin: Positive for wound. Neurological: Negative for weakness and numbness. PAST MEDICAL HISTORY         Diagnosis Date    Anti-phospholipid antibody syndrome ([facility] [address]     Cancer ([facility] [address]     uterine        SURGICALHISTORY     Patient  has a past surgical history that includes Hysterectomy; Foot surgery; and eye surgery (Bilateral). CURRENT MEDICATIONS       Previous Medications    ASPIRIN 325 MG TABLET    Take 81 mg by mouth daily     CALCIUM CARBONATE-VITAMIN D (CALCIUM + D) 600-200 MG-UNIT TABS    Take  by mouth.       CLOPIDOGREL (PLAVIX) 75 MG TABLET    Take 75 mg by mouth daily    CYCLOSPORINE (RESTASIS) 0.05 % OPHTHALMIC EMULSION    1 drop 2 times daily    HYDROCHLOROTHIAZIDE (MICROZIDE) 12.5 MG CAPSULE    Take 12.5 mg by mouth daily    MULTIVITAMIN (THERAGRAN) PER TABLET    Take 1 tablet by mouth daily. PREDNISOLONE ACETATE (PRED FORTE) 1 % OPHTHALMIC SUSPENSION    1 drop 4 times daily. TRAZODONE HCL PO    Take 50 mg by mouth       ALLERGIES     Patient is is allergic to toradol [ketorolac tromethamine]. Patients   Immunization History   Administered Date(s) Administered    COVID-19, Pfizer, PF, 30mcg/0.3mL 03/01/2021, 03/15/2021    Td, unspecified formulation 09/24/2012       FAMILY HISTORY     Patient's family history is not on file. SOCIAL HISTORY     Patient  reports that she has never smoked. She has never used smokeless tobacco. She reports current alcohol use of about 1.0 standard drinks of alcohol per week. She reports that she does not use drugs. PHYSICAL EXAM     ED TRIAGE VITALS  BP: 124/68, Temp: 97.5 °F (36.4 °C), Pulse: 84, Resp: 16, SpO2: 95 %,Estimated body mass index is 28.7 kg/m² as calculated from the following:    Height as of this encounter: 5' 7.5\" (1.715 m). Weight as of this encounter: 186 lb (84.4 kg). ,No LMP recorded. Patient is postmenopausal.    Physical Exam  Vitals and nursing note reviewed. Constitutional:       General: She is not in acute distress. Appearance: She is well-developed. She is not diaphoretic. Eyes:      Conjunctiva/sclera:      Right eye: Right conjunctiva is not injected. Left eye: Left conjunctiva is not injected. Pupils: Pupils are equal.   Cardiovascular:      Rate and Rhythm: Normal rate and regular rhythm. Heart sounds: No murmur heard. Pulmonary:      Effort: Pulmonary effort is normal. No respiratory distress. Breath sounds: Normal breath sounds. Musculoskeletal:      Cervical back: Normal range of motion. Right knee: Normal range of motion. Left knee: Normal range of motion. Skin:     General: Skin is warm. Findings: Wound present. No rash.              Comments: Skin tear noted to left lower extremity as pictured below Neurological:      Mental Status: She is alert and oriented to person, place, and time. Psychiatric:         Behavior: Behavior normal.             DIAGNOSTIC RESULTS     Labs:No results found for this visit on 05/20/21. IMAGING:    No orders to display         EKG:  none    URGENT CARE COURSE:     Vitals:    05/20/21 1559   BP: 124/68   Pulse: 84   Resp: 16   Temp: 97.5 °F (36.4 °C)   TempSrc: Temporal   SpO2: 95%   Weight: 186 lb (84.4 kg)   Height: 5' 7.5\" (1.715 m)       Medications - No data to display         PROCEDURES:  None    FINAL IMPRESSION      1. Noninfected skin tear of left lower extremity, initial encounter          DISPOSITION/ PLAN     The skin from the skin tear was retracted from under the wound, however was not able to be stretched to the surrounding borders and therefore could not be Steri-Stripped. The excess skin was cut and the patient was instructed on the plan to allow the wound to heal on its own and prevent infection with wound care and she is placed on oral and topical antibiotics. She is instructed to follow-up on an outpatient basis as needed and be sure to keep the wound clean and dry. She is agreeable to plan as discussed. PATIENT REFERRED TO:  Kishan Gagnon MD  [address] [facility], [id number] / [facility] [address]      DISCHARGE MEDICATIONS:  New Prescriptions    BACITRACIN ZINC 500 UNIT/GM OINTMENT    Apply topically 2 times daily.     CEPHALEXIN (KEFLEX) 500 MG CAPSULE    Take 1 capsule by mouth 3 times daily for 10 days       Discontinued Medications    No medications on file       Current Discharge Medication List          VERO Pressley CNP    (Please note that portions of this note were completed with a voice recognition program. Efforts were made to edit the dictations but occasionally words are mis-transcribed.)          VERO Pressley CNP  05/20/21 2546

## 2021-09-08 ENCOUNTER — HOSPITAL ENCOUNTER (EMERGENCY)
Age: 81
Discharge: HOME | End: 2021-09-08
Payer: MEDICARE

## 2021-09-08 VITALS
TEMPERATURE: 97 F | RESPIRATION RATE: 16 BRPM | HEART RATE: 89 BPM | OXYGEN SATURATION: 97 % | DIASTOLIC BLOOD PRESSURE: 66 MMHG | SYSTOLIC BLOOD PRESSURE: 134 MMHG

## 2021-09-08 DIAGNOSIS — N30.01 ACUTE CYSTITIS WITH HEMATURIA: Primary | ICD-10-CM

## 2021-09-08 LAB
BILIRUBIN URINE: ABNORMAL
BLOOD, URINE: ABNORMAL
CHARACTER, URINE: ABNORMAL
COLOR: YELLOW
GLUCOSE URINE: NEGATIVE MG/DL
KETONES, URINE: ABNORMAL
LEUKOCYTE ESTERASE, URINE: ABNORMAL
NITRITE, URINE: NEGATIVE
PH UA: 6.5 (ref 5–9)
PROTEIN UA: 30 MG/DL
SPECIFIC GRAVITY, URINE: 1.02 (ref 1–1.03)
UROBILINOGEN, URINE: 1 EU/DL (ref 0.2–1)

## 2021-09-08 PROCEDURE — 87077 CULTURE AEROBIC IDENTIFY: CPT

## 2021-09-08 PROCEDURE — 87086 URINE CULTURE/COLONY COUNT: CPT

## 2021-09-08 PROCEDURE — 81003 URINALYSIS AUTO W/O SCOPE: CPT

## 2021-09-08 PROCEDURE — 87186 SC STD MICRODIL/AGAR DIL: CPT

## 2021-09-08 PROCEDURE — 99213 OFFICE O/P EST LOW 20 MIN: CPT

## 2021-09-08 PROCEDURE — 99213 OFFICE O/P EST LOW 20 MIN: CPT | Performed by: NURSE PRACTITIONER

## 2021-09-08 RX ORDER — NITROFURANTOIN 25; 75 MG/1; MG/1
100 CAPSULE ORAL 2 TIMES DAILY
Qty: 10 CAPSULE | Refills: 0 | Status: SHIPPED | OUTPATIENT
Start: 2021-09-08 | End: 2021-09-13

## 2021-09-08 ASSESSMENT — ENCOUNTER SYMPTOMS
DIARRHEA: 0
ABDOMINAL PAIN: 0
NAUSEA: 0
VOMITING: 0
SHORTNESS OF BREATH: 0
BACK PAIN: 0

## 2021-09-08 NOTE — ED PROVIDER NOTES
[address]  Urgent Care Encounter       CHIEF COMPLAINT       Chief Complaint   Patient presents with    Urinary Frequency     onset this am        Nurses Notes reviewed and I agree except as noted in the HPI. HISTORY OF PRESENT ILLNESS   Junior Ewing is a 79 y.o. female who presents with complaints of a urinary urgency and frequency, onset this morning. Patient denies dysuria, back pain, flank pain, abdominal pain. No fever, chills, nausea or vomiting. The history is provided by the patient. REVIEW OF SYSTEMS     Review of Systems   Constitutional: Negative for chills, fatigue and fever. Respiratory: Negative for shortness of breath. Cardiovascular: Negative for chest pain. Gastrointestinal: Negative for abdominal pain, diarrhea, nausea and vomiting. Genitourinary: Positive for frequency and urgency. Negative for dysuria, flank pain and hematuria. Musculoskeletal: Negative for back pain. Neurological: Negative for dizziness and headaches. PAST MEDICAL HISTORY         Diagnosis Date    Anti-phospholipid antibody syndrome ([facility] [address]     Cancer ([facility] [address]     uterine        SURGICALHISTORY     Patient  has a past surgical history that includes Hysterectomy; Foot surgery; and eye surgery (Bilateral). CURRENT MEDICATIONS       Discharge Medication List as of 9/8/2021  9:57 AM      CONTINUE these medications which have NOT CHANGED    Details   hydroCHLOROthiazide (MICROZIDE) 12.5 MG capsule Take 12.5 mg by mouth dailyHistorical Med      clopidogrel (PLAVIX) 75 MG tablet Take 75 mg by mouth dailyHistorical Med      cycloSPORINE (RESTASIS) 0.05 % ophthalmic emulsion 1 drop 2 times dailyHistorical Med      TRAZODONE HCL PO Take 50 mg by mouthHistorical Med      prednisoLONE acetate (PRED FORTE) 1 % ophthalmic suspension 1 drop 4 times daily. multivitamin (THERAGRAN) per tablet Take 1 tablet by mouth daily.         Calcium Carbonate-Vitamin D (CALCIUM + D) 600-200 MG-UNIT TABS Take  by mouth. aspirin 325 MG tablet Take 81 mg by mouth daily Historical Med             ALLERGIES     Patient is is allergic to toradol [ketorolac tromethamine]. Patients   Immunization History   Administered Date(s) Administered    COVID-19, Pfizer, PF, 30mcg/0.3mL 03/01/2021, 03/15/2021    Td, unspecified formulation 09/24/2012       FAMILY HISTORY     Patient's family history is not on file. SOCIAL HISTORY     Patient  reports that she has never smoked. She has never used smokeless tobacco. She reports current alcohol use of about 1.0 standard drinks of alcohol per week. She reports that she does not use drugs. PHYSICAL EXAM     ED TRIAGE VITALS  BP: 134/66, Temp: 97 °F (36.1 °C), Pulse: 89, Resp: 16, SpO2: 97 %,Estimated body mass index is 28.7 kg/m² as calculated from the following:    Height as of 5/20/21: 5' 7.5\" (1.715 m). Weight as of 5/20/21: 186 lb (84.4 kg). ,No LMP recorded. Patient is postmenopausal.    Physical Exam  Vitals and nursing note reviewed. Constitutional:       General: She is not in acute distress. Appearance: She is well-developed. HENT:      Head: Normocephalic and atraumatic. Cardiovascular:      Rate and Rhythm: Normal rate and regular rhythm. Heart sounds: Normal heart sounds. Pulmonary:      Effort: Pulmonary effort is normal. No respiratory distress. Breath sounds: Normal breath sounds. Abdominal:      General: Bowel sounds are normal.      Palpations: Abdomen is soft. Tenderness: There is no abdominal tenderness. There is no right CVA tenderness or left CVA tenderness. Skin:     General: Skin is warm and dry. Neurological:      Mental Status: She is alert and oriented to person, place, and time. Psychiatric:         Speech: Speech normal.         Behavior: Behavior normal. Behavior is cooperative.          DIAGNOSTIC RESULTS     Labs:  Results for orders placed or performed during the hospital encounter of CNP    (Please note that portions of this note were completed with a voice recognition program. Efforts were made to edit the dictations but occasionally words are mis-transcribed.)         Sharon Officer Case, VERO - MEGHAN  09/08/21 1006

## 2021-09-10 LAB
ORGANISM: ABNORMAL
URINE CULTURE, ROUTINE: ABNORMAL

## 2021-09-13 ENCOUNTER — HOSPITAL ENCOUNTER (EMERGENCY)
Age: 81
Discharge: HOME | End: 2021-09-13
Payer: MEDICARE

## 2021-09-13 VITALS
HEART RATE: 83 BPM | RESPIRATION RATE: 16 BRPM | DIASTOLIC BLOOD PRESSURE: 79 MMHG | OXYGEN SATURATION: 95 % | SYSTOLIC BLOOD PRESSURE: 121 MMHG | TEMPERATURE: 97.4 F

## 2021-09-13 DIAGNOSIS — N30.01 ACUTE CYSTITIS WITH HEMATURIA: Primary | ICD-10-CM

## 2021-09-13 LAB
BILIRUBIN URINE: ABNORMAL
BLOOD, URINE: ABNORMAL
CHARACTER, URINE: ABNORMAL
COLOR: ABNORMAL
GLUCOSE URINE: NEGATIVE MG/DL
KETONES, URINE: ABNORMAL
LEUKOCYTE ESTERASE, URINE: ABNORMAL
NITRITE, URINE: NEGATIVE
PH UA: 5.5 (ref 5–9)
PROTEIN UA: ABNORMAL MG/DL
SPECIFIC GRAVITY, URINE: 1.02 (ref 1–1.03)
UROBILINOGEN, URINE: 0.2 EU/DL (ref 0.2–1)

## 2021-09-13 PROCEDURE — 81003 URINALYSIS AUTO W/O SCOPE: CPT

## 2021-09-13 PROCEDURE — 99213 OFFICE O/P EST LOW 20 MIN: CPT | Performed by: NURSE PRACTITIONER

## 2021-09-13 PROCEDURE — 99213 OFFICE O/P EST LOW 20 MIN: CPT

## 2021-09-13 PROCEDURE — 87086 URINE CULTURE/COLONY COUNT: CPT

## 2021-09-13 RX ORDER — CIPROFLOXACIN 500 MG/1
500 TABLET, FILM COATED ORAL 2 TIMES DAILY
Qty: 10 TABLET | Refills: 0 | Status: SHIPPED | OUTPATIENT
Start: 2021-09-13 | End: 2021-09-18

## 2021-09-13 ASSESSMENT — ENCOUNTER SYMPTOMS
VOMITING: 0
COUGH: 0
NAUSEA: 0
SHORTNESS OF BREATH: 0

## 2021-09-13 NOTE — ED NOTES
No change in patients condition. Discharge instructions and prescriptions discussed with pt. Pt. Verbalized understanding of info given and ambulated to exit in stable condition.       Do Mckoy RN  09/13/21 6456

## 2021-09-13 NOTE — ED PROVIDER NOTES
[address]  Urgent Care Encounter       CHIEF COMPLAINT       Chief Complaint   Patient presents with    Dysuria     took last pill of antibiotic given last week for UTI and it never went completely away        Nurses Notes reviewed and I agree except as noted in the HPI. HISTORY OF PRESENT ILLNESS   Sharri Santiago is a 79 y.o. female who presents For evaluation of dysuria and urinary frequency. Patient was seen 5 days ago and diagnosed with a urinary tract infection. States that she took her antibiotic as prescribed but finished it today and still has symptoms. She states that the symptoms do seem to have improved but she can still feel urgency and dysuria when she urinates. She is concerned that she could have a lingering UTI. She denies any fever, chills, nausea, vomiting or any other concerning symptoms. The history is provided by the patient. REVIEW OF SYSTEMS     Review of Systems   Constitutional: Negative for chills and fever. Respiratory: Negative for cough and shortness of breath. Cardiovascular: Negative for chest pain. Gastrointestinal: Negative for nausea and vomiting. Genitourinary: Positive for dysuria and frequency. Musculoskeletal: Negative for arthralgias and myalgias. Skin: Negative for rash. Neurological: Negative for headaches. PAST MEDICAL HISTORY         Diagnosis Date    Anti-phospholipid antibody syndrome ([facility] [address]     Cancer ([facility] [address]     uterine        SURGICALHISTORY     Patient  has a past surgical history that includes Hysterectomy; Foot surgery; and eye surgery (Bilateral). CURRENT MEDICATIONS       Previous Medications    ASPIRIN 325 MG TABLET    Take 81 mg by mouth daily     CALCIUM CARBONATE-VITAMIN D (CALCIUM + D) 600-200 MG-UNIT TABS    Take  by mouth.       CLOPIDOGREL (PLAVIX) 75 MG TABLET    Take 75 mg by mouth daily    CYCLOSPORINE (RESTASIS) 0.05 % OPHTHALMIC EMULSION    1 drop 2 times daily    HYDROCHLOROTHIAZIDE (MICROZIDE) 12.5 MG CAPSULE    Take 12.5 mg by mouth daily    MULTIVITAMIN (THERAGRAN) PER TABLET    Take 1 tablet by mouth daily. NITROFURANTOIN, MACROCRYSTAL-MONOHYDRATE, (MACROBID) 100 MG CAPSULE    Take 1 capsule by mouth 2 times daily for 5 days    PREDNISOLONE ACETATE (PRED FORTE) 1 % OPHTHALMIC SUSPENSION    1 drop 4 times daily. TRAZODONE HCL PO    Take 50 mg by mouth       ALLERGIES     Patient is is allergic to toradol [ketorolac tromethamine]. Patients   Immunization History   Administered Date(s) Administered    COVID-19, Pfizer, PF, 30mcg/0.3mL 03/01/2021, 03/15/2021    Td, unspecified formulation 09/24/2012       FAMILY HISTORY     Patient's family history is not on file. SOCIAL HISTORY     Patient  reports that she has never smoked. She has never used smokeless tobacco. She reports current alcohol use of about 1.0 standard drinks of alcohol per week. She reports that she does not use drugs. PHYSICAL EXAM     ED TRIAGE VITALS  BP: 121/79, Temp: 97.4 °F (36.3 °C), Pulse: 83, Resp: 16, SpO2: 95 %,Estimated body mass index is 28.7 kg/m² as calculated from the following:    Height as of 5/20/21: 5' 7.5\" (1.715 m). Weight as of 5/20/21: 186 lb (84.4 kg). ,No LMP recorded. Patient is postmenopausal.    Physical Exam  Vitals and nursing note reviewed. Constitutional:       General: She is not in acute distress. Appearance: She is well-developed. She is not diaphoretic. Eyes:      Conjunctiva/sclera:      Right eye: Right conjunctiva is not injected. Left eye: Left conjunctiva is not injected. Pupils: Pupils are equal.   Cardiovascular:      Rate and Rhythm: Normal rate and regular rhythm. Heart sounds: No murmur heard. Pulmonary:      Effort: Pulmonary effort is normal. No respiratory distress. Breath sounds: Normal breath sounds. Abdominal:      General: Bowel sounds are normal.      Tenderness: There is no abdominal tenderness.  There is no right CVA tenderness or left CVA tenderness. Musculoskeletal:      Cervical back: Normal range of motion. Right knee: Normal range of motion. Left knee: Normal range of motion. Skin:     General: Skin is warm. Findings: No rash. Neurological:      Mental Status: She is alert and oriented to person, place, and time. Psychiatric:         Behavior: Behavior normal.         DIAGNOSTIC RESULTS     Labs:  Results for orders placed or performed during the hospital encounter of 09/13/21   Urinalysis   Result Value Ref Range    Glucose, Ur Negative NEGATIVE mg/dl    Bilirubin Urine Small (A) NEGATIVE    Ketones, Urine Trace (A) NEGATIVE    Specific Gravity, Urine 1.025 1.002 - 1.030    Blood, Urine Trace-intact NEGATIVE    pH, UA 5.50 5.0 - 9.0    Protein, UA Trace (A) NEGATIVE mg/dl    Urobilinogen, Urine 0.20 0.2 - 1.0 eu/dl    Nitrite, Urine Negative NEGATIVE    Leukocyte Esterase, Urine Small (A) NEGATIVE    Color, UA Dark yellow (A) STRAW-YELLOW    Character, Urine Cloudy (A) CLEAR-SL CLOUD       IMAGING:    No orders to display         EKG:  none    URGENT CARE COURSE:     Vitals:    09/13/21 1644   BP: 121/79   Pulse: 83   Resp: 16   Temp: 97.4 °F (36.3 °C)   TempSrc: Temporal   SpO2: 95%       Medications - No data to display         PROCEDURES:  None    FINAL IMPRESSION      1. Acute cystitis with hematuria          DISPOSITION/ PLAN       Urine sample does appear to be consistent with a continued urinary tract infection. I did review the previous culture and sensitivity and I discussed with the patient the plan to treat with oral Cipro. She is agreeable to plan as discussed and will follow-up on an outpatient basis as needed.     PATIENT REFERRED TO:  Miri Terrell MD  [address] [facility] [place] [address]  [id number] / [facility] [id number]      DISCHARGE MEDICATIONS:  New Prescriptions    CIPROFLOXACIN (CIPRO) 500 MG TABLET    Take 1 tablet by mouth 2 times daily for 5 days       Discontinued Medications    No medications on file Current Discharge Medication List          EVRO Parra CNP    (Please note that portions of this note were completed with a voice recognition program. Efforts were made to edit the dictations but occasionally words are mis-transcribed.)          VERO Parra CNP  09/13/21 0480

## 2021-09-15 LAB
ORGANISM: ABNORMAL
URINE CULTURE, ROUTINE: ABNORMAL

## 2022-03-30 ENCOUNTER — HOSPITAL ENCOUNTER (EMERGENCY)
Age: 82
Discharge: HOME | End: 2022-03-30
Payer: MEDICARE

## 2022-03-30 VITALS
HEART RATE: 90 BPM | RESPIRATION RATE: 16 BRPM | OXYGEN SATURATION: 96 % | DIASTOLIC BLOOD PRESSURE: 65 MMHG | SYSTOLIC BLOOD PRESSURE: 114 MMHG | TEMPERATURE: 96.8 F

## 2022-03-30 DIAGNOSIS — N30.01 ACUTE CYSTITIS WITH HEMATURIA: Primary | ICD-10-CM

## 2022-03-30 LAB
BILIRUBIN URINE: ABNORMAL
BLOOD, URINE: ABNORMAL
CHARACTER, URINE: CLEAR
COLOR: YELLOW
GLUCOSE URINE: NEGATIVE MG/DL
KETONES, URINE: ABNORMAL
LEUKOCYTE ESTERASE, URINE: ABNORMAL
NITRITE, URINE: NEGATIVE
PH UA: 5.5 (ref 5–9)
PROTEIN UA: ABNORMAL MG/DL
SPECIFIC GRAVITY, URINE: >= 1.03 (ref 1–1.03)
UROBILINOGEN, URINE: 1 EU/DL (ref 0.2–1)

## 2022-03-30 PROCEDURE — 87086 URINE CULTURE/COLONY COUNT: CPT

## 2022-03-30 PROCEDURE — 99213 OFFICE O/P EST LOW 20 MIN: CPT | Performed by: NURSE PRACTITIONER

## 2022-03-30 PROCEDURE — 99213 OFFICE O/P EST LOW 20 MIN: CPT

## 2022-03-30 PROCEDURE — 81003 URINALYSIS AUTO W/O SCOPE: CPT

## 2022-03-30 RX ORDER — NITROFURANTOIN 25; 75 MG/1; MG/1
100 CAPSULE ORAL 2 TIMES DAILY
Qty: 10 CAPSULE | Refills: 0 | Status: SHIPPED | OUTPATIENT
Start: 2022-03-30 | End: 2022-04-04

## 2022-03-30 NOTE — ED NOTES
Patient discharge instructions given to pt and pt verbalized understanding, 1 px given, no other needs at this time, and pt left in stable condition.      Leann Lang RN  03/30/22 1925

## 2022-03-30 NOTE — ED PROVIDER NOTES
[address]  Urgent Care Encounter       CHIEF COMPLAINT       Chief Complaint   Patient presents with    Dysuria       Nurses Notes reviewed and I agree except as noted in the HPI. HISTORY OF PRESENT ILLNESS   Emily Weiss is a 79 y.o. female who presents with complaints of discomfort with urinating, frequency, and increased amount. However, patient does admit to having a decreased oral intake. She states she has not felt well the past 1 to 2 days. She has a history of UTIs in the past.  She denies any flank pain, abdominal pain, or fevers. She has not tried anything for treatment. The history is provided by the patient. REVIEW OF SYSTEMS     Review of Systems   Constitutional: Negative for chills and fever. Genitourinary: Positive for decreased urine volume, dysuria and frequency. Negative for flank pain, hematuria and urgency. Musculoskeletal: Negative for myalgias. Neurological: Negative for dizziness and weakness. PAST MEDICAL HISTORY         Diagnosis Date    Anti-phospholipid antibody syndrome ([facility] [address]     Cancer ([facility] [address]     uterine        SURGICALHISTORY     Patient  has a past surgical history that includes Hysterectomy; Foot surgery; and eye surgery (Bilateral). CURRENT MEDICATIONS       Previous Medications    ASPIRIN 81 MG EC TABLET    Take 81 mg by mouth daily     CALCIUM CARBONATE-VITAMIN D (CALCIUM + D) 600-200 MG-UNIT TABS    Take  by mouth. CLOPIDOGREL (PLAVIX) 75 MG TABLET    Take 75 mg by mouth daily    CYCLOSPORINE (RESTASIS) 0.05 % OPHTHALMIC EMULSION    1 drop 2 times daily    HYDROCHLOROTHIAZIDE (MICROZIDE) 12.5 MG CAPSULE    Take 12.5 mg by mouth daily    MULTIVITAMIN (THERAGRAN) PER TABLET    Take 1 tablet by mouth daily. PREDNISOLONE ACETATE (PRED FORTE) 1 % OPHTHALMIC SUSPENSION    1 drop 4 times daily.       TRAZODONE HCL PO    Take 50 mg by mouth       ALLERGIES     Patient is is allergic to toradol CelebCalls tromethamine]. Patients   Immunization History   Administered Date(s) Administered    COVID-19, Pfizer Purple top, DILUTE for use, 12+ yrs, 30mcg/0.3mL dose 03/01/2021, 03/15/2021, 10/04/2021    Td, unspecified formulation 09/24/2012       FAMILY HISTORY     Patient's family history is not on file. SOCIAL HISTORY     Patient  reports that she has never smoked. She has never used smokeless tobacco. She reports current alcohol use of about 1.0 standard drink of alcohol per week. She reports that she does not use drugs. PHYSICAL EXAM     ED TRIAGE VITALS  BP: 114/65, Temp: 96.8 °F (36 °C), Pulse: 90, Resp: 16, SpO2: 96 %,Estimated body mass index is 28.7 kg/m² as calculated from the following:    Height as of 5/20/21: 5' 7.5\" (1.715 m). Weight as of 5/20/21: 186 lb (84.4 kg). ,No LMP recorded. Patient is postmenopausal.    Physical Exam  Vitals and nursing note reviewed. Constitutional:       General: She is not in acute distress. Cardiovascular:      Rate and Rhythm: Normal rate. Heart sounds: Normal heart sounds. Pulmonary:      Effort: Pulmonary effort is normal.      Breath sounds: Normal breath sounds. Abdominal:      General: Abdomen is flat. Bowel sounds are normal.      Palpations: Abdomen is soft. Tenderness: There is no abdominal tenderness. Skin:     General: Skin is warm and dry. Neurological:      Mental Status: She is alert and oriented to person, place, and time.          DIAGNOSTIC RESULTS     Labs:  Results for orders placed or performed during the hospital encounter of 03/30/22   Urinalysis   Result Value Ref Range    Glucose, Ur Negative NEGATIVE mg/dl    Bilirubin Urine Small (A) NEGATIVE    Ketones, Urine Trace (A) NEGATIVE    Specific Gravity, Urine >= 1.030 1.002 - 1.030    Blood, Urine Small (A) NEGATIVE    pH, UA 5.50 5.0 - 9.0    Protein, UA Trace (A) NEGATIVE mg/dl    Urobilinogen, Urine 1.00 0.2 - 1.0 eu/dl    Nitrite, Urine Negative NEGATIVE    Leukocyte Esterase, Urine Trace (A) NEGATIVE    Color, UA Yellow STRAW-YELLOW    Character, Urine Clear CLEAR-SL CLOUD       IMAGING:  None    EKG:  None    URGENT CARE COURSE:     Vitals:    03/30/22 1905   BP: 114/65   Pulse: 90   Resp: 16   Temp: 96.8 °F (36 °C)   TempSrc: Temporal   SpO2: 96%       Medications - No data to display       PROCEDURES:  None    FINAL IMPRESSION      1. Acute cystitis with hematuria      DISPOSITION/ PLAN   DISPOSITION Decision To Discharge 03/30/2022 07:22:36 PM     Urinalysis revealed positive UTI. Will start patient on Macrobid. Advised to increase oral fluid intake. May take over-the-counter antipyretics as necessary. Follow-up with PCP if worsens or fails to improve. Patient voiced understanding was agreeable to above-mentioned plan. Patient was discharged in stable condition.     PATIENT REFERRED TO:  Mary Kemp MD  [address] [facility] [address]  [id number] / [place]  [id number]      DISCHARGE MEDICATIONS:  New Prescriptions    NITROFURANTOIN, MACROCRYSTAL-MONOHYDRATE, (MACROBID) 100 MG CAPSULE    Take 1 capsule by mouth 2 times daily for 5 days       Discontinued Medications    No medications on file       Current Discharge Medication List          VERO Xiao CNP    (Please note that portions of this note were completed with a voice recognition program. Efforts were made to edit the dictations but occasionally words are mis-transcribed.)            VERO Xiao CNP  03/30/22 1927

## 2022-04-01 LAB
ORGANISM: ABNORMAL
URINE CULTURE, ROUTINE: ABNORMAL

## 2022-04-21 ENCOUNTER — HOSPITAL ENCOUNTER (OUTPATIENT)
Dept: MRI IMAGING | Age: 82
Discharge: HOME | End: 2022-04-21

## 2022-04-21 ENCOUNTER — INITIAL CONSULT (OUTPATIENT)
Dept: NEUROLOGY | Age: 82
End: 2022-04-21
Payer: MEDICARE

## 2022-04-21 VITALS
BODY MASS INDEX: 25.46 KG/M2 | HEART RATE: 84 BPM | HEIGHT: 68 IN | OXYGEN SATURATION: 96 % | WEIGHT: 168 LBS | DIASTOLIC BLOOD PRESSURE: 74 MMHG | SYSTOLIC BLOOD PRESSURE: 122 MMHG

## 2022-04-21 DIAGNOSIS — R29.6 FREQUENT FALLS: ICD-10-CM

## 2022-04-21 DIAGNOSIS — Z00.6 ENCOUNTER FOR EXAMINATION FOR NORMAL COMPARISON AND CONTROL IN CLINICAL RESEARCH PROGRAM: ICD-10-CM

## 2022-04-21 DIAGNOSIS — R26.89 BALANCE PROBLEM: ICD-10-CM

## 2022-04-21 DIAGNOSIS — R26.89 SHUFFLING GAIT: ICD-10-CM

## 2022-04-21 DIAGNOSIS — R25.8 BRADYKINESIA: ICD-10-CM

## 2022-04-21 DIAGNOSIS — G20 PARKINSON'S DISEASE (HCC): Primary | ICD-10-CM

## 2022-04-21 PROCEDURE — 99205 OFFICE O/P NEW HI 60 MIN: CPT | Performed by: PSYCHIATRY & NEUROLOGY

## 2022-04-21 RX ORDER — PREDNISOLONE SODIUM PHOSPHATE 10 MG/ML
1 SOLUTION/ DROPS OPHTHALMIC 4 TIMES DAILY
COMMUNITY

## 2022-04-21 RX ORDER — ATORVASTATIN CALCIUM 40 MG/1
TABLET, FILM COATED ORAL
COMMUNITY
Start: 2022-03-21

## 2022-04-21 RX ORDER — HYPROMELLOSE/PF 0.3 %
DROPS OPHTHALMIC (EYE)
COMMUNITY

## 2022-04-21 NOTE — PATIENT INSTRUCTIONS
1. Obtain MRI brain images from [facility] for review  2. Start Sinemet 25/100 mg three times a day, take every 5 hours while awake  3. Vitamin B12, folate  4. Vitamin B6 level  5. Follow through with physical therapy recommendations  6. Stay physically active  7. Continue using your walker  8. Call with any new symptoms or concerns. 9. Follow up in 1 month.

## 2022-04-22 LAB
FOLATE: >25 NG/ML
VITAMIN B-12: 502 PG/ML (ref 180–914)
VITAMIN B6: 283 NMOL/L (ref 20–125)

## 2022-04-26 ENCOUNTER — TELEPHONE (OUTPATIENT)
Dept: NEUROLOGY | Age: 82
End: 2022-04-26

## 2022-04-26 NOTE — TELEPHONE ENCOUNTER
----- Message from VERO Casillas - CNP sent at 4/25/2022  2:52 PM EDT -----  Please let patient know her vitamin B6 elvel is elevated=[id number]. She needs to stop vitamin B6 supplements as elevated vitamin B6 level can be neurotoxic.    Chelsey Puente, CNP

## 2022-04-26 NOTE — TELEPHONE ENCOUNTER
Hue from [facility] called requesting orders for patient. He was there this morning for a blood draw and Mom asked her to draw patient's Valproic Acid (?). She was able to but needs the order to run the test. Please return her call to [phone]     Patient notified and verbalized understanding.

## 2022-05-01 NOTE — PROGRESS NOTES
Chief Complaint   Patient presents with    Consultation     balance, memory, frequent falls       Abraham Conner is a 79 y.o. female who presents today for evaluation of balance trouble and frequent falls for at least the past 6 months. Her last fall was 2 months ago. She started using a walker 1 month ago and has not fallen since. He balance is poor. She feels slower with her walking and when performing her ADLs. When she was falling she could fall in all directions. She does shuffle her feet when she walks. No dysphagia. She has trouble getting out of chairs and low lying couches. Her handwriting in smaller. No change in pitch of her voice. No hypersalivation. She  denies chest pain. No shortness of breath, no neck pain. No vision changes. No dysphagia. No fever. No rash. No weight loss. MRI brain done at [facility] in February 2022. showed No acute CVA or brain mass. Multifocal white matter findings favor chronic vasculopathy. 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 History provided by patient accompanied by her . Past Medical History:   Diagnosis Date    Anti-phospholipid antibody syndrome ([facility] [address]     [facility] ([facility]     uterine     Memory disorder        There is no problem list on file for this patient. Allergies   Allergen Reactions    Toradol [Ketorolac Tromethamine] Nausea Only       Current Outpatient Medications   Medication Sig Dispense Refill    atorvastatin (LIPITOR) 40 MG tablet       Hypromellose, PF, (RETAINE HPMC) 0.3 % SOLN Apply to eye      prednisoLONE sodium phosphate (INFLAMASE FORTE) 1 % ophthalmic solution 1 drop 4 times daily      hydroCHLOROthiazide (MICROZIDE) 12.5 MG capsule Take 12.5 mg by mouth daily      clopidogrel (PLAVIX) 75 MG tablet Take 75 mg by mouth daily      multivitamin (THERAGRAN) per tablet Take 1 tablet by mouth daily.         Calcium Carbonate-Vitamin D (CALCIUM + D) 600-200 MG-UNIT TABS Take  by mouth.  aspirin 81 MG EC tablet Take 81 mg by mouth daily       cycloSPORINE (RESTASIS) 0.05 % ophthalmic emulsion 1 drop 2 times daily (Patient not taking: Reported on 4/21/2022)      TRAZODONE HCL PO Take 50 mg by mouth (Patient not taking: Reported on 4/21/2022)      prednisoLONE acetate (PRED FORTE) 1 % ophthalmic suspension 1 drop 4 times daily. No current facility-administered medications for this visit. Social History     Socioeconomic History    Marital status:      Spouse name: None    Number of children: None    Years of education: None    Highest education level: None   Occupational History    None   Tobacco Use    Smoking status: Never Smoker    Smokeless tobacco: Never Used   Vaping Use    Vaping Use: Never used   Substance and Sexual Activity    Alcohol use: Yes     Alcohol/week: 1.0 standard drink     Types: 1 Glasses of wine per week    Drug use: No    Sexual activity: Not Currently     Partners: Male   Other Topics Concern    None   Social History Narrative    None     Social Determinants of Health     Financial Resource Strain:     Difficulty of Paying Living Expenses: Not on file   Food Insecurity:     Worried About Running Out of Food in the Last Year: Not on file    Sajan of Food in the Last Year: Not on file   Transportation Needs:     Lack of Transportation (Medical): Not on file    Lack of Transportation (Non-Medical):  Not on file   Physical Activity:     Days of Exercise per Week: Not on file    Minutes of Exercise per Session: Not on file   Stress:     Feeling of Stress : Not on file   Social Connections:     Frequency of Communication with Friends and Family: Not on file    Frequency of Social Gatherings with Friends and Family: Not on file    Attends Mandaen Services: Not on file    Active Member of Clubs or Organizations: Not on file    Attends Club or Organization Meetings: Not on file   [address] [place] [address] Marital Status: Not on file   Intimate Partner Violence:     Fear of Current or Ex-Partner: Not on file    Emotionally Abused: Not on file    Physically Abused: Not on file    Sexually Abused: Not on file   Housing Stability:     Unable to Pay for Housing in the Last Year: Not on file    Number of [place] in the Last Year: Not on file    Unstable Housing in the Last Year: Not on file       Family History   Problem Relation Age of Onset    Cancer Mother     Heart Attack Father     No Known Problems Sister          I reviewed the past medical history, allergies, medications, social history and family history. Review of Systems   All systems reviewed, and are all negative, except what is mentioned in HPI      Vitals:    04/21/22 1209   BP: 122/74   Site: Right Upper Arm   Position: Sitting   Cuff Size: Medium Adult   Pulse: 84   SpO2: 96%   Weight: 168 lb (76.2 kg)   Height: 5' 7.5\" (1.715 m)       Physical Examination:  General appearance - alert, well appearing, and in no distress, oriented to person, place, and time and normal weight  Mental status- Level of Alertness: awake  Orientation: person, place, time  Memory: normal  Fund of Knowledge: normal  Attention/Concentration: normal  Language: normal. Mood is normal.   Neck - supple, no significant adenopathy, carotids upstroke normal bilaterally. There is no axillary lymphadenopathy. There is no carotid bruit. No neck lymphadenopathy.  No thyroid enlargement  Neurological -   Cranial [id number]:.   Cranial nerve II: Normal   Cranial nerve III: Pupils: equal, round, reactive to light  Cranial nerves III, IV, VI: Extraocular Movements: intact   Cranial nerve V: Facial sensation: intact   Cranial nerve VII:Facial strength: intact   Cranial nerve VIII: Hearing: intact   Cranial nerve IX: Palate Elevation intact bilaterally  Cranial nerve XI: Shoulder shrug intact bilaterally  Cranial nerve XII: Tongue midline   neck supple without rigidity, there is  limitation of range of motion of the neck, bilateral  DTR's are decreased distal and symmetric  Babinski sign negative  Motor exam is 5/5 in the upper and lower extremities . Normal muscle tone. There is no muscle atrophy. Sensory is intact for light touch. Coordination: finger to nose, intact  Gait and station uses walker, reduced foot clearance. no resting tremor, no pinrolling +ve bradykinesia, no Hypohonia, +ve decreased blink rate, +ve difficulty exiting chair, no decreased arm swing, +ve stooped forward, +ve Gait shuffling, there is reduced foot clearance. +ve micrographia, uses  walker   Abnormal movement none, vibration  Reduced distally  Skin - warm, dry to touch, normal coloration, no rashes, no suspicious skin lesions  Superficial temporal artery pulses are normal.   There is no limitation of range of motion of the neck. Musculoskeletal: Has no hand arthritis, no limitation of ROM in any of the four extremities. There is no leg edema. The Heart was regular in rate and rhythm. No heart murmur  Chest Clear, with  good effort. Abdomen soft, intact bowel sounds. We reviewed the patient records from referring provider and available information in the EHR       ASSESSMENT:      Diagnosis Orders   1. Parkinson's disease ([facility]     2. Bradykinesia     3. Shuffling gait     4. Balance problem     5. Frequent falls        This is an 79-year-old female who presents with balance trouble and frequent falls that of been ongoing for at least the past 6 months. Her last fall was 2 months ago. She started using a walker 1 month ago and has not fallen since. He balance is poor. She feels slower with her walking and when performing her ADLs. MRI brain 2/28/2022 at [facility] was reviewed after the visit, showed cortical atrophy with periventricular white matter disease. There is no acute process or brain mass. There is a 7 mm extra-axial enhancing lesion is suggestive of a venous varix posterior lateral to the right occipital lobe. The patient was counseled about her symptoms and MRI brain results, and work up recommended, she was also counseled about medication options and side effects. On exam,  no resting tremor, no pinrolling +ve bradykinesia, no Hypohonia, +ve decreased blink rate, +ve difficulty exiting chair, no decreased arm swing, +ve stooped forward, +ve Gait shuffling, there is reduced foot clearance. +ve micrographia, uses  walker we shared with the patient that her symptoms are most consistent with early Parkinson's disease. We will start her on Sinemet trial of 25/100 mg 3 times a day. We will obtain lab work checking vitamin levels as well as arrange for her to undergo formal evaluation with physical therapy regarding gait safety. After detailed discussion with the patient and her  we agreed on the following plan. Plan    1. MRI brain 2/28/2022 from [facility] reviewed  2. Start Sinemet 25/100 mg three times a day, take every 5 hours while awake  3. Vitamin B12, folate  4. Vitamin B6 level  5. Follow through with physical therapy recommendations  6. Stay physically active  7. Continue using your walker  8. Call with any new symptoms or concerns. 9. Follow up in 1 month.      Total time 79 min      Lorraine Mistry MD

## 2022-05-18 ENCOUNTER — OFFICE VISIT (OUTPATIENT)
Dept: NEUROLOGY | Age: 82
End: 2022-05-18
Payer: MEDICARE

## 2022-05-18 VITALS
BODY MASS INDEX: 25.46 KG/M2 | WEIGHT: 168 LBS | HEIGHT: 68 IN | HEART RATE: 78 BPM | DIASTOLIC BLOOD PRESSURE: 74 MMHG | OXYGEN SATURATION: 98 % | SYSTOLIC BLOOD PRESSURE: 121 MMHG

## 2022-05-18 DIAGNOSIS — G20 PARKINSON'S DISEASE (HCC): Primary | ICD-10-CM

## 2022-05-18 DIAGNOSIS — R26.89 SHUFFLING GAIT: ICD-10-CM

## 2022-05-18 DIAGNOSIS — R26.89 BALANCE PROBLEM: ICD-10-CM

## 2022-05-18 DIAGNOSIS — R29.6 FREQUENT FALLS: ICD-10-CM

## 2022-05-18 DIAGNOSIS — R25.8 BRADYKINESIA: ICD-10-CM

## 2022-05-18 PROCEDURE — 99213 OFFICE O/P EST LOW 20 MIN: CPT | Performed by: NURSE PRACTITIONER

## 2022-05-18 NOTE — PROGRESS NOTES
NEUROLOGY OUT PATIENT FOLLOW UP NOTE:  5/18/202210:40 AM    Ej Snider is here for follow up for parkinson's disease, bradykinesia, shuffling gait, balance trouble, falls. ROS:  Respiratory : no cough, no shortness of breath  Cardiac: no chest pain. No palpitations. Renal : no flank pain, no hematuria    Skin: no rash      Allergies   Allergen Reactions    Toradol [Ketorolac Tromethamine] Nausea Only       Current Outpatient Medications:     atorvastatin (LIPITOR) 40 MG tablet, , Disp: , Rfl:     Hypromellose, PF, (RETAINE [facility]MC) 0.3 % SOLN, Apply to eye, Disp: , Rfl:     prednisoLONE sodium phosphate (INFLAMASE FORTE) 1 % ophthalmic solution, 1 drop 4 times daily, Disp: , Rfl:     carbidopa-levodopa (SINEMET)  MG per tablet, Take 1 tablet by mouth 3 times daily, Disp: 90 tablet, Rfl: 3    hydroCHLOROthiazide (MICROZIDE) 12.5 MG capsule, Take 12.5 mg by mouth daily, Disp: , Rfl:     clopidogrel (PLAVIX) 75 MG tablet, Take 75 mg by mouth daily, Disp: , Rfl:     cycloSPORINE (RESTASIS) 0.05 % ophthalmic emulsion, 1 drop 2 times daily , Disp: , Rfl:     TRAZODONE HCL PO, Take 50 mg by mouth , Disp: , Rfl:     prednisoLONE acetate (PRED FORTE) 1 % ophthalmic suspension, 1 drop 4 times daily. , Disp: , Rfl:     multivitamin (THERAGRAN) per tablet, Take 1 tablet by mouth daily. , Disp: , Rfl:     Calcium Carbonate-Vitamin D (CALCIUM + D) 600-200 MG-UNIT TABS, Take  by mouth.  , Disp: , Rfl:     aspirin 81 MG EC tablet, Take 81 mg by mouth daily , Disp: , Rfl:     I reviewed the past medical history, allergies, medications, social history and family history. PE:   Vitals:    05/18/22 1029   BP: 121/74   Site: Right Upper Arm   Position: Sitting   Cuff Size: Medium Adult   Pulse: 78   SpO2: 98%   Weight: 168 lb (76.2 kg)   Height: 5' 7.5\" (1.715 m)     General Appearance:  awake, alert, oriented, in no acute distress  Gen: NAD, Language is Intact. Skin: no rash, lesion, dry to touch. warm  Head: no rash, no icterus  Neck: There is no carotid bruits. The Neck is supple. There is no neck lymphadenopathy. Neuro: CN 2-12 grossly intact with no focal deficits. Power 5/5 Throughout symmetric, Reflexes are  symmetric. Long tracts are intact. Cerebellar exam is Intact. Sensory exam is intact to light touch. Gait is intact, she is mildly stooped forward, no shuffling. There is reduced blink rate, mild bradykinesia. Musculoskeletal:  Has no hand arthritis, no limitation of ROM in any of the four extremities. Lower extremities no edema          DATA:      Results for orders placed or performed in visit on 04/21/22   Vitamin B12 & Folate   Result Value Ref Range    Vitamin B-12 502 180 - 914 pg/mL    Folate >25.0 >5.8 ng/mL   Vitamin B6   Result Value Ref Range    Vitamin B6, Plasma 283 (H) 20 - 125 nmol/L            Assessment:     Diagnosis Orders   1. Parkinson's disease ([facility] [address]     2. Bradykinesia     3. Shuffling gait     4. Frequent falls     5. Balance problem          Follow up for parkinson's disease, bradykinesia, shuffling gait, frequent falls. She is doing significantly better. Her  feels her walking has improved \"100%. \"  No falls. She denies any dysphagia or hallucination. She is on sinemet and is tolerating the medication well, no side effects. On exam, she is better with her ambulation, no shuffling noted. There is reduced blink rate. No tremor noted on exam. She is also working with physical therapy and feels benefit. She did have lab work done that showed elevated vitamin B6 level, she has since stopped vitamin B6 supplements. After detailed discussion with patient and her  we agreed on the following plan. Plan:  1. Continue with  Sinemet 25/100 mg three times a day, take every 5 hours while awake  2. Follow through with physical therapy recommendations  3. Stay physically active  4. Continue using your walker  5. Call with any new symptoms or concerns. 6. Follow up in 3 months.         Total time 26 min    VERO Pozo - CNP

## 2022-05-18 NOTE — PATIENT INSTRUCTIONS
1. Continue with  Sinemet 25/100 mg three times a day, take every 5 hours while awake  2. Follow through with physical therapy recommendations  3. Stay physically active  4. Continue using your walker  5. Call with any new symptoms or concerns. 6. Follow up in 3 months.

## 2022-08-15 ENCOUNTER — NURSE ONLY (OUTPATIENT)
Dept: LAB | Age: 82
End: 2022-08-15

## 2022-08-15 ENCOUNTER — OFFICE VISIT (OUTPATIENT)
Dept: NEUROLOGY | Age: 82
End: 2022-08-15
Payer: MEDICARE

## 2022-08-15 VITALS
DIASTOLIC BLOOD PRESSURE: 80 MMHG | WEIGHT: 154 LBS | HEART RATE: 83 BPM | SYSTOLIC BLOOD PRESSURE: 118 MMHG | HEIGHT: 67 IN | OXYGEN SATURATION: 99 % | BODY MASS INDEX: 24.17 KG/M2

## 2022-08-15 DIAGNOSIS — M89.9 DISORDER OF BONE, UNSPECIFIED: ICD-10-CM

## 2022-08-15 DIAGNOSIS — R25.8 BRADYKINESIA: ICD-10-CM

## 2022-08-15 DIAGNOSIS — G20 PARKINSON'S DISEASE (HCC): ICD-10-CM

## 2022-08-15 DIAGNOSIS — G20 PARKINSON'S DISEASE (HCC): Primary | ICD-10-CM

## 2022-08-15 DIAGNOSIS — R26.89 SHUFFLING GAIT: ICD-10-CM

## 2022-08-15 DIAGNOSIS — R26.89 BALANCE PROBLEM: ICD-10-CM

## 2022-08-15 LAB
FOLATE: 17.8 NG/ML (ref 4.8–24.2)
VITAMIN B-12: 482 PG/ML (ref 211–911)
VITAMIN D 25-HYDROXY: 83 NG/ML (ref 30–100)

## 2022-08-15 PROCEDURE — 99214 OFFICE O/P EST MOD 30 MIN: CPT | Performed by: PSYCHIATRY & NEUROLOGY

## 2022-08-15 PROCEDURE — 1123F ACP DISCUSS/DSCN MKR DOCD: CPT | Performed by: PSYCHIATRY & NEUROLOGY

## 2022-08-15 RX ORDER — ACETAMINOPHEN 500 MG
500 TABLET ORAL 2 TIMES DAILY
COMMUNITY

## 2022-08-15 RX ORDER — AMOXICILLIN 500 MG/1
500 CAPSULE ORAL
COMMUNITY

## 2022-08-15 NOTE — PROGRESS NOTES
NEUROLOGY OUT PATIENT FOLLOW UP NOTE:  8/15/579967:41 AM    Naye Castle is here for follow up for parkinson's disease, bradykinesia, shuffling gait, balance trouble, falls. Allergies   Allergen Reactions    Toradol [Ketorolac Tromethamine] Nausea Only       Current Outpatient Medications:     acetaminophen (TYLENOL) 500 MG tablet, Take 500 mg by mouth in the morning and 500 mg before bedtime. , Disp: , Rfl:     amoxicillin (AMOXIL) 500 MG capsule, Take 500 mg by mouth, Disp: , Rfl:     atorvastatin (LIPITOR) 40 MG tablet, , Disp: , Rfl:     Hypromellose, PF, (RETAINE HPMC) 0.3 % SOLN, Apply to eye, Disp: , Rfl:     prednisoLONE sodium phosphate (INFLAMASE FORTE) 1 % ophthalmic solution, 1 drop 4 times daily, Disp: , Rfl:     carbidopa-levodopa (SINEMET)  MG per tablet, Take 1 tablet by mouth 3 times daily, Disp: 90 tablet, Rfl: 3    hydroCHLOROthiazide (MICROZIDE) 12.5 MG capsule, Take 12.5 mg by mouth daily, Disp: , Rfl:     clopidogrel (PLAVIX) 75 MG tablet, Take 75 mg by mouth daily, Disp: , Rfl:     cycloSPORINE (RESTASIS) 0.05 % ophthalmic emulsion, 1 drop 2 times daily , Disp: , Rfl:     TRAZODONE HCL PO, Take 50 mg by mouth , Disp: , Rfl:     prednisoLONE acetate (PRED FORTE) 1 % ophthalmic suspension, 1 drop 4 times daily. , Disp: , Rfl:     multivitamin (THERAGRAN) per tablet, Take 1 tablet by mouth daily. , Disp: , Rfl:     calcium carbonate-vitamin D 600-200 MG-UNIT TABS, Take  by mouth.  , Disp: , Rfl:     aspirin 81 MG EC tablet, Take 81 mg by mouth daily , Disp: , Rfl:     I reviewed the past medical history, allergies, medications, social history and family history. PE:   Vitals:    08/15/22 1032   BP: 118/80   Site: Right Upper Arm   Position: Sitting   Cuff Size: Medium Adult   Pulse: 83   SpO2: 99%   Weight: 154 lb (69.9 kg)   Height: 5' 7\" (1.702 m)     General Appearance:  awake, alert, oriented, in no acute distress  Gen: NAD, Language is Intact.  Skin: no rash, lesion, dry to touch. warm  Head: no rash, no icterus  Neck: There is no carotid bruits. The Neck is supple. There is no neck lymphadenopathy. Neuro: CN 2-12 grossly intact with no focal deficits. Power 5/5 Throughout symmetric, Reflexes are  symmetric. Long tracts are intact. Cerebellar exam is Intact. Sensory exam is intact to light touch. Gait is intact, she is mildly stooped forward, no shuffling. There is reduced blink rate, mild bradykinesia. Musculoskeletal:  Has no hand arthritis, no limitation of ROM in any of the four extremities. Lower extremities no edema          DATA:      Results for orders placed or performed in visit on 04/21/22   Vitamin B12 & Folate   Result Value Ref Range    Vitamin B-12 502 180 - 914 pg/mL    Folate >25.0 >5.8 ng/mL   Vitamin B6   Result Value Ref Range    Vitamin B6, Plasma 283 (H) 20 - 125 nmol/L            Assessment:     Diagnosis Orders   1. Parkinson's disease ([facility]        2. Bradykinesia        3. Shuffling gait        4. Balance problem            Follow up for parkinson's disease, bradykinesia, shuffling gait. She is doing the same. No recent falls. She is not using the walker all the time. Her exam is unchanged. On exam, she is better with her ambulation, no shuffling noted. There is reduced blink rate. No tremor noted on exam. She is also reports benefit to physical therapy. She is having some memory problems, misplacing items at home. Will order B12, Folate, B6, and vitamin D. After detailed discussion with patient and her  we agreed on the following plan. Plan:  B12, Folate, B6, Vitamin D. Continue with  Sinemet 25/100 mg three times a day, take every 5 hours while awake  Follow through with physical therapy recommendations, and stay physically active  Continue using your walker  Call with any new symptoms or concerns. Follow up in 4 months.         Total time 31 min    Shruti Rodas MD

## 2022-08-15 NOTE — PATIENT INSTRUCTIONS
B12, Folate, B6, Vitamin D. Continue with  Sinemet 25/100 mg three times a day, take every 5 hours while awake  Follow through with physical therapy recommendations, and stay physically active  Continue using your walker  Call with any new symptoms or concerns. Follow up in 4 months.

## 2022-08-19 LAB — VITAMIN B6: 102 NMOL/L (ref 20–125)

## 2022-08-22 DIAGNOSIS — G20 PARKINSON'S DISEASE (HCC): Primary | ICD-10-CM

## 2022-12-12 ENCOUNTER — OFFICE VISIT (OUTPATIENT)
Dept: NEUROLOGY | Age: 82
End: 2022-12-12
Payer: MEDICARE

## 2022-12-12 VITALS
BODY MASS INDEX: 22.58 KG/M2 | HEART RATE: 66 BPM | SYSTOLIC BLOOD PRESSURE: 125 MMHG | DIASTOLIC BLOOD PRESSURE: 70 MMHG | WEIGHT: 149 LBS | HEIGHT: 68 IN | OXYGEN SATURATION: 97 %

## 2022-12-12 DIAGNOSIS — R29.6 FREQUENT FALLS: ICD-10-CM

## 2022-12-12 DIAGNOSIS — R25.8 BRADYKINESIA: ICD-10-CM

## 2022-12-12 DIAGNOSIS — R26.89 SHUFFLING GAIT: ICD-10-CM

## 2022-12-12 DIAGNOSIS — R26.89 BALANCE PROBLEM: ICD-10-CM

## 2022-12-12 DIAGNOSIS — G20 PARKINSON'S DISEASE (HCC): Primary | ICD-10-CM

## 2022-12-12 PROCEDURE — 99213 OFFICE O/P EST LOW 20 MIN: CPT | Performed by: NURSE PRACTITIONER

## 2022-12-12 PROCEDURE — 1123F ACP DISCUSS/DSCN MKR DOCD: CPT | Performed by: NURSE PRACTITIONER

## 2022-12-12 RX ORDER — MIDODRINE HYDROCHLORIDE 5 MG/1
10 TABLET ORAL 3 TIMES DAILY
COMMUNITY

## 2022-12-12 NOTE — PATIENT INSTRUCTIONS
Change Sinemet 25/100 mg to 1.5 tablets three times a day, take every 5 hours while awake  Follow through with physical therapy recommendations, and stay physically active  Continue using your walker  Call with any new symptoms or concerns. Follow up in 4 months.

## 2022-12-12 NOTE — PROGRESS NOTES
NEUROLOGY OUT PATIENT FOLLOW UP NOTE:  12/12/202211:17 AM    Lazaro Berg is here for follow up for   parkinson's disease, bradykinesia, shuffling gait, balance trouble, falls. ROS:  Respiratory : no cough, no shortness of breath  Cardiac: no chest pain. No palpitations. Renal : no flank pain, no hematuria    Skin: no rash      Allergies   Allergen Reactions    Toradol [Ketorolac Tromethamine] Nausea Only       Current Outpatient Medications:     midodrine (PROAMATINE) 5 MG tablet, Take 10 mg by mouth 3 times daily, Disp: , Rfl:     carbidopa-levodopa (SINEMET)  MG per tablet, Take 1 tablet by mouth 3 times daily, Disp: 90 tablet, Rfl: 5    acetaminophen (TYLENOL) 500 MG tablet, Take 500 mg by mouth in the morning and 500 mg before bedtime. , Disp: , Rfl:     atorvastatin (LIPITOR) 40 MG tablet, , Disp: , Rfl:     Hypromellose, PF, (RETAINE HPMC) 0.3 % SOLN, Apply to eye, Disp: , Rfl:     prednisoLONE sodium phosphate (INFLAMASE FORTE) 1 % ophthalmic solution, 1 drop 4 times daily, Disp: , Rfl:     clopidogrel (PLAVIX) 75 MG tablet, Take 75 mg by mouth daily, Disp: , Rfl:     cycloSPORINE (RESTASIS) 0.05 % ophthalmic emulsion, 1 drop 2 times daily , Disp: , Rfl:     prednisoLONE acetate (PRED FORTE) 1 % ophthalmic suspension, 1 drop 4 times daily. , Disp: , Rfl:     multivitamin (THERAGRAN) per tablet, Take 1 tablet by mouth daily.   , Disp: , Rfl:     calcium carbonate-vitamin D 600-200 MG-UNIT TABS, Take  by mouth.  , Disp: , Rfl:     aspirin 81 MG EC tablet, Take 81 mg by mouth daily , Disp: , Rfl:     amoxicillin (AMOXIL) 500 MG capsule, Take 500 mg by mouth (Patient not taking: Reported on 12/12/2022), Disp: , Rfl:     hydroCHLOROthiazide (MICROZIDE) 12.5 MG capsule, Take 12.5 mg by mouth daily (Patient not taking: Reported on 12/12/2022), Disp: , Rfl:     TRAZODONE HCL PO, Take 50 mg by mouth  (Patient not taking: Reported on 12/12/2022), Disp: , Rfl:     I reviewed the past medical history, allergies, medications, social history and family history. PE:   Vitals:    12/12/22 1105   BP: 125/70   Site: Left Upper Arm   Position: Sitting   Cuff Size: Large Adult   Pulse: 66   SpO2: 97%   Weight: 149 lb (67.6 kg)   Height: 5' 7.5\" (1.715 m)     General Appearance:  awake, alert, oriented, in no acute distress  Gen: NAD, Language is Intact. Skin: no rash, lesion, dry to touch. warm  Head: no rash, no icterus  Neck: There is no carotid bruits. The Neck is supple. There is no neck lymphadenopathy. Neuro: CN 2-12 grossly intact with no focal deficits. Power 5/5 Throughout symmetric, Reflexes are  symmetric. Long tracts are intact. Cerebellar exam is Intact. Sensory exam is intact to light touch. Gait is intact, she is mildly stooped forward, no shuffling. There is reduced blink rate, mild bradykinesia. Musculoskeletal:  Has no hand arthritis, no limitation of ROM in any of the four extremities. Lower extremities no edema          DATA:        Results for orders placed or performed in visit on 08/15/22   Vitamin D 25 Hydroxy   Result Value Ref Range    Vit D, 25-Hydroxy 83 30 - 100 ng/ml   Vitamin B6   Result Value Ref Range    Vitamin B6, Plasma 102.0 20.0 - 125.0 nmol/L   Vitamin B12 & Folate   Result Value Ref Range    Vitamin B-12 482 211 - 911 pg/mL    Folate 17.8 4.8 - 24.2 ng/mL           8/15/22 1109 4/21/22 1329   Vitamin B6, Plasma 20.0 - 125.0 nmol/L 102.0          8/15/22 1109     Vit D, 25-Hydroxy 30 - 100 ng/ml 83       8/15/22 1109 4/21/22 1329    Vitamin B-12 211 - 911 pg/mL 482  502 R    Folate 4.8 - 24.2 ng/mL 17.8  >25.0      Assessment:     Diagnosis Orders   1. Parkinson's disease ([facility] [address]        2. Bradykinesia        3. Shuffling gait        4. Balance problem        5. Frequent falls              Follow up for parkinson's disease, bradykinesia, shuffling gait. She is doing the same. No recent falls. She is on Sinemet 25/100 mg three times a day. No dysphagia. No hallucination. On exam, she is better with her ambulation, no shuffling noted. There is reduced blink rate. No tremor noted on exam. She is also reports benefit to physical therapy. She does have occasional dizziness, she is on midodrine. After detailed discussion with patient and her  we agreed on the following plan. Plan:  Change  Sinemet 25/100 mg to 1.5 tablets three times a day, take every 5 hours while awake  Follow through with physical therapy recommendations, and stay physically active  Continue using your walker  Call with any new symptoms or concerns. Follow up in 4 months.         Total time 26 min    Ray Aguillon, APRN - CNP

## 2023-03-15 ENCOUNTER — OUTSIDE SERVICES (OUTPATIENT)
Dept: FAMILY MEDICINE CLINIC | Age: 83
End: 2023-03-15
Payer: MEDICARE

## 2023-03-15 VITALS
SYSTOLIC BLOOD PRESSURE: 130 MMHG | WEIGHT: 139.2 LBS | HEART RATE: 85 BPM | RESPIRATION RATE: 18 BRPM | OXYGEN SATURATION: 95 % | DIASTOLIC BLOOD PRESSURE: 70 MMHG | BODY MASS INDEX: 21.48 KG/M2 | TEMPERATURE: 97.8 F

## 2023-03-15 DIAGNOSIS — E78.00 PURE HYPERCHOLESTEROLEMIA: ICD-10-CM

## 2023-03-15 DIAGNOSIS — R29.6 FREQUENT FALLS: ICD-10-CM

## 2023-03-15 DIAGNOSIS — S22.080D COMPRESSION FRACTURE OF T12 VERTEBRA WITH ROUTINE HEALING, SUBSEQUENT ENCOUNTER: Primary | ICD-10-CM

## 2023-03-15 DIAGNOSIS — I25.10 CORONARY ARTERY DISEASE INVOLVING NATIVE CORONARY ARTERY OF NATIVE HEART WITHOUT ANGINA PECTORIS: ICD-10-CM

## 2023-03-15 DIAGNOSIS — I95.1 ORTHOSTATIC HYPOTENSION: ICD-10-CM

## 2023-03-15 DIAGNOSIS — B96.20 E. COLI UTI: ICD-10-CM

## 2023-03-15 DIAGNOSIS — N39.0 E. COLI UTI: ICD-10-CM

## 2023-03-15 DIAGNOSIS — G20 PARKINSON'S DISEASE (HCC): ICD-10-CM

## 2023-03-15 PROBLEM — G20.A1 PARKINSON'S DISEASE: Status: ACTIVE | Noted: 2023-03-15

## 2023-03-15 PROCEDURE — 99306 1ST NF CARE HIGH MDM 50: CPT | Performed by: FAMILY MEDICINE

## 2023-03-15 PROCEDURE — 99490 CHRNC CARE MGMT STAFF 1ST 20: CPT | Performed by: FAMILY MEDICINE

## 2023-03-15 ASSESSMENT — ENCOUNTER SYMPTOMS
NAUSEA: 0
DIARRHEA: 0
EYE REDNESS: 0
COUGH: 0
EYE DISCHARGE: 0
VOMITING: 0
CONSTIPATION: 0
SHORTNESS OF BREATH: 0

## 2023-03-15 NOTE — PROGRESS NOTES
Ashkan Rosales is a 79 y.o. female who presents today for her medical conditions/complaints as noted below. Chief Complaint   Patient presents with    Other     Admission to [address] [place] [address]  3/13/23       HPI:     Shaquille Tang is an 80 yo female who was seen today with her  for her admission to [address] [place] [address] Justen Su (admitted 3/13/23). She was admitted to [facility] from 3/8/23-3/13/23. Hospital records, labs, and imaging were reviewed and are summarized below. On 3/8 she was taken to the ER via EMS for evaluation of mid back pain. She fell 5 days prior and was seen after, but her pain worsened and she fell twice since. She was found to have a T12 compression fracture and an E. Coli UTI. She was evaluated by Ortho and PT/OT were recommended. Her chronic medical conditions include Parkinson's disease, orthostatic hypotension, CAD, HLD, CVD, and xeropthalmia. She reports that if she could see better she wouldn't fall as much. She had a fall in the hallway last night but did not injure herself. Past Medical History:   Diagnosis Date    Anti-phospholipid antibody syndrome ([facility] [address]     Cancer ([facility] [address]     uterine     Memory disorder       Past Surgical History:   Procedure Laterality Date    EYE SURGERY Bilateral     corneal transplants     FOOT SURGERY      HYSTERECTOMY (CERVIX STATUS UNKNOWN)         Family History   Problem Relation Age of Onset    Cancer Mother     Heart Attack Father     No Known Problems Sister        Social History     Tobacco Use    Smoking status: Never    Smokeless tobacco: Never   Substance Use Topics    Alcohol use:  Yes     Alcohol/week: 1.0 standard drink     Types: 1 Glasses of wine per week     Comment: couple sips mas      Allergies   Allergen Reactions    Toradol [Ketorolac Tromethamine] Nausea Only       Health Maintenance   Topic Date Due    Depression Screen  Never done    Shingles vaccine (1 of 2) Never done    DEXA (modify frequency per FRAX score) Never done    Pneumococcal 65+ years Vaccine (1 - PCV) Never done    DTaP/Tdap/Td vaccine (1 - Tdap) 09/25/2012    Annual Wellness Visit (AWV)  06/11/2021    Lipids  10/10/2023    Flu vaccine  Completed    COVID-19 Vaccine  Completed    Hepatitis A vaccine  Aged Out    Hib vaccine  Aged Out    Meningococcal (ACWY) vaccine  Aged Out       Subjective:      Review of Systems   Constitutional:  Negative for activity change, appetite change and fever. HENT:  Positive for hearing loss. Eyes:  Positive for visual disturbance. Negative for discharge and redness. Respiratory:  Negative for cough and shortness of breath. Gastrointestinal:  Negative for constipation, diarrhea, nausea and vomiting. Musculoskeletal:  Positive for arthralgias and gait problem. Neurological:  Positive for weakness. Hematological:  Bruises/bleeds easily. Psychiatric/Behavioral:  Negative for dysphoric mood. The patient is not nervous/anxious. Objective:     Physical Exam  Vitals and nursing note reviewed. Constitutional:       Appearance: Normal appearance. HENT:      Head: Normocephalic and atraumatic. Cardiovascular:      Rate and Rhythm: Normal rate and regular rhythm. Pulses: Normal pulses. Pulmonary:      Effort: Pulmonary effort is normal. No respiratory distress. Breath sounds: Normal breath sounds. Abdominal:      General: Bowel sounds are normal. There is no distension. Palpations: Abdomen is soft. Neurological:      Mental Status: She is alert. /70   Pulse 85   Temp 97.8 °F (36.6 °C)   Resp 18   Wt 139 lb 3.2 oz (63.1 kg)   SpO2 95%   BMI 21.48 kg/m²     Assessment:      1. Compression fracture of T12 vertebra with routine healing, subsequent encounter - Continue with Lidocaine patches PRN. PT/OT to evaluate and treat. 2. Frequent falls - PT/OT to evaluate and treat. 3. Orthostatic hypotension - Continue on Midodrine and monitor BP routinely.    4. E. coli UTI - Complete course of Keflex and monitor for symptoms. 5. Parkinson's disease ([address] - Continue on Sinemet and f/u with neurology as directed. 6. Coronary artery disease involving native coronary artery of native heart without angina pectoris - She is asymptomatic so will continue on ASA, Plavix, and statin. 7. Pure hypercholesterolemia - Continue with ASA, Plavix, statin, and a healthy diet. Patient seen and examined. History partially obtained by chart review and nursing notes. I have reviewed patient's past medical, surgical, social, and family history and have made updates where appropriate. See facility EMR for updated medication list.     Resident has Parkinson's disease, CAD, orthostatic hypotension, HLD, CVD and it is expected to last 15 or more months. These chronic conditions place resident at a significant risk of death, acute exacerbation, decline in function or functional decline. His comprehensive plan was established, implemented, revised or monitored today. I spent 20 minutes reviewing plan.       Electronically signed by Yovanny Kent MD on 3/15/2023 at 4:08 PM

## 2023-03-26 ENCOUNTER — ANESTHESIA (OUTPATIENT)
Dept: OPERATING ROOM | Age: 83
End: 2023-03-26
Payer: MEDICARE

## 2023-03-26 ENCOUNTER — APPOINTMENT (OUTPATIENT)
Dept: GENERAL RADIOLOGY | Age: 83
DRG: 579 | End: 2023-03-26
Payer: MEDICARE

## 2023-03-26 ENCOUNTER — ANCILLARY PROCEDURE (OUTPATIENT)
Dept: EMERGENCY DEPT | Age: 83
DRG: 579 | End: 2023-03-26
Payer: MEDICARE

## 2023-03-26 ENCOUNTER — ANESTHESIA EVENT (OUTPATIENT)
Dept: OPERATING ROOM | Age: 83
End: 2023-03-26
Payer: MEDICARE

## 2023-03-26 ENCOUNTER — APPOINTMENT (OUTPATIENT)
Dept: CT IMAGING | Age: 83
DRG: 579 | End: 2023-03-26
Payer: MEDICARE

## 2023-03-26 ENCOUNTER — HOSPITAL ENCOUNTER (INPATIENT)
Age: 83
DRG: 579 | End: 2023-03-26
Attending: FAMILY MEDICINE | Admitting: SURGERY
Payer: MEDICARE

## 2023-03-26 DIAGNOSIS — S09.90XA CLOSED HEAD INJURY, INITIAL ENCOUNTER: ICD-10-CM
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
## 2023-03-26 DIAGNOSIS — I95.89 OTHER SPECIFIED HYPOTENSION: ICD-10-CM

## 2023-03-26 DIAGNOSIS — S01.01XA LACERATION OF SCALP, INITIAL ENCOUNTER: Primary | ICD-10-CM

## 2023-03-26 PROBLEM — S01.91XA TRAUMATIC HEAD INJURY WITH MULTIPLE LACERATIONS, INITIAL ENCOUNTER: Status: ACTIVE | Noted: 2023-03-26

## 2023-03-26 LAB
ABO: NORMAL
ALBUMIN SERPL BCG-MCNC: 3.1 G/DL (ref 3.5–5.1)
ALP SERPL-CCNC: 57 U/L (ref 38–126)
ALT SERPL W/O P-5'-P-CCNC: < 5 U/L (ref 11–66)
ANION GAP SERPL CALC-SCNC: 13 MEQ/L (ref 8–16)
ANTIBODY SCREEN: NORMAL
APTT PPP: 27.8 SECONDS (ref 22–38)
AST SERPL-CCNC: 16 U/L (ref 5–40)
BASOPHILS ABSOLUTE: 0 THOU/MM3 (ref 0–0.1)
BASOPHILS NFR BLD AUTO: 0.5 %
BILIRUB CONJ SERPL-MCNC: < 0.2 MG/DL (ref 0–0.3)
BILIRUB SERPL-MCNC: 0.7 MG/DL (ref 0.3–1.2)
BUN SERPL-MCNC: 17 MG/DL (ref 7–22)
CALCIUM SERPL-MCNC: 8.6 MG/DL (ref 8.5–10.5)
CHLORIDE SERPL-SCNC: 103 MEQ/L (ref 98–111)
CO2 SERPL-SCNC: 23 MEQ/L (ref 23–33)
CREAT SERPL-MCNC: 0.8 MG/DL (ref 0.4–1.2)
DEPRECATED RDW RBC AUTO: 49.7 FL (ref 35–45)
EOSINOPHIL NFR BLD AUTO: 3.3 %
EOSINOPHILS ABSOLUTE: 0.3 THOU/MM3 (ref 0–0.4)
ERYTHROCYTE [DISTWIDTH] IN BLOOD BY AUTOMATED COUNT: 13.9 % (ref 11.5–14.5)
GFR SERPL CREATININE-BSD FRML MDRD: > 60 ML/MIN/1.73M2
GLUCOSE SERPL-MCNC: 223 MG/DL (ref 70–108)
HCT VFR BLD AUTO: 31 % (ref 37–47)
HCT VFR BLD AUTO: 33.4 % (ref 37–47)
HCT VFR BLD AUTO: 39.8 % (ref 37–47)
HGB BLD-MCNC: 10.9 GM/DL (ref 12–16)
HGB BLD-MCNC: 12.7 GM/DL (ref 12–16)
HGB BLD-MCNC: 9.5 GM/DL (ref 12–16)
IMM GRANULOCYTES # BLD AUTO: 0.04 THOU/MM3 (ref 0–0.07)
IMM GRANULOCYTES NFR BLD AUTO: 0.5 %
INR PPP: 1.08 (ref 0.85–1.13)
LIPASE SERPL-CCNC: 8.1 U/L (ref 5.6–51.3)
LYMPHOCYTES ABSOLUTE: 3 THOU/MM3 (ref 1–4.8)
LYMPHOCYTES NFR BLD AUTO: 35.2 %
MAGNESIUM SERPL-MCNC: 2 MG/DL (ref 1.6–2.4)
MCH RBC QN AUTO: 29.9 PG (ref 26–33)
MCHC RBC AUTO-ENTMCNC: 30.6 GM/DL (ref 32.2–35.5)
MCV RBC AUTO: 97.5 FL (ref 81–99)
MONOCYTES ABSOLUTE: 0.7 THOU/MM3 (ref 0.4–1.3)
MONOCYTES NFR BLD AUTO: 7.8 %
MRSA DNA SPEC QL NAA+PROBE: NEGATIVE
NEUTROPHILS NFR BLD AUTO: 52.7 %
NRBC BLD AUTO-RTO: 0 /100 WBC
OSMOLALITY SERPL CALC.SUM OF ELEC: 286 MOSMOL/KG (ref 275–300)
PLATELET # BLD AUTO: 164 THOU/MM3 (ref 130–400)
PMV BLD AUTO: 11 FL (ref 9.4–12.4)
POTASSIUM SERPL-SCNC: 3.5 MEQ/L (ref 3.5–5.2)
PROT SERPL-MCNC: 5.5 G/DL (ref 6.1–8)
RBC # BLD AUTO: 3.18 MILL/MM3 (ref 4.2–5.4)
RH FACTOR: NORMAL
SEGMENTED NEUTROPHILS ABSOLUTE COUNT: 4.5 THOU/MM3 (ref 1.8–7.7)
SODIUM SERPL-SCNC: 139 MEQ/L (ref 135–145)
TROPONIN T: < 0.01 NG/ML
VANA ISLT/SPM QL: NEGATIVE
WBC # BLD AUTO: 8.5 THOU/MM3 (ref 4.8–10.8)

## 2023-03-26 PROCEDURE — 87500 VANOMYCIN DNA AMP PROBE: CPT

## 2023-03-26 PROCEDURE — 76376 3D RENDER W/INTRP POSTPROCES: CPT

## 2023-03-26 PROCEDURE — 0BH17EZ INSERTION OF ENDOTRACHEAL AIRWAY INTO TRACHEA, VIA NATURAL OR ARTIFICIAL OPENING: ICD-10-PCS | Performed by: FAMILY MEDICINE

## 2023-03-26 PROCEDURE — 2580000003 HC RX 258

## 2023-03-26 PROCEDURE — 51702 INSERT TEMP BLADDER CATH: CPT

## 2023-03-26 PROCEDURE — 85014 HEMATOCRIT: CPT

## 2023-03-26 PROCEDURE — 0JQ00ZZ REPAIR SCALP SUBCUTANEOUS TISSUE AND FASCIA, OPEN APPROACH: ICD-10-PCS | Performed by: SURGERY

## 2023-03-26 PROCEDURE — 36430 TRANSFUSION BLD/BLD COMPNT: CPT

## 2023-03-26 PROCEDURE — 96374 THER/PROPH/DIAG INJ IV PUSH: CPT

## 2023-03-26 PROCEDURE — 94002 VENT MGMT INPAT INIT DAY: CPT

## 2023-03-26 PROCEDURE — 2580000003 HC RX 258: Performed by: ANESTHESIOLOGY

## 2023-03-26 PROCEDURE — 84484 ASSAY OF TROPONIN QUANT: CPT

## 2023-03-26 PROCEDURE — 2000000000 HC ICU R&B

## 2023-03-26 PROCEDURE — 6820000003 HC L2 TRAUMA ALERT ACTIVATION: Performed by: SURGERY

## 2023-03-26 PROCEDURE — 3209999900

## 2023-03-26 PROCEDURE — 80076 HEPATIC FUNCTION PANEL: CPT

## 2023-03-26 PROCEDURE — 70450 CT HEAD/BRAIN W/O DYE: CPT

## 2023-03-26 PROCEDURE — 6360000002 HC RX W HCPCS

## 2023-03-26 PROCEDURE — 86923 COMPATIBILITY TEST ELECTRIC: CPT

## 2023-03-26 PROCEDURE — P9016 RBC LEUKOCYTES REDUCED: HCPCS

## 2023-03-26 PROCEDURE — 72125 CT NECK SPINE W/O DYE: CPT

## 2023-03-26 PROCEDURE — 83690 ASSAY OF LIPASE: CPT

## 2023-03-26 PROCEDURE — 71045 X-RAY EXAM CHEST 1 VIEW: CPT

## 2023-03-26 PROCEDURE — 99285 EMERGENCY DEPT VISIT HI MDM: CPT

## 2023-03-26 PROCEDURE — 3600000002 HC SURGERY LEVEL 2 BASE: Performed by: SURGERY

## 2023-03-26 PROCEDURE — 99291 CRITICAL CARE FIRST HOUR: CPT | Performed by: INTERNAL MEDICINE

## 2023-03-26 PROCEDURE — 85610 PROTHROMBIN TIME: CPT

## 2023-03-26 PROCEDURE — 2580000003 HC RX 258: Performed by: STUDENT IN AN ORGANIZED HEALTH CARE EDUCATION/TRAINING PROGRAM

## 2023-03-26 PROCEDURE — 87086 URINE CULTURE/COLONY COUNT: CPT

## 2023-03-26 PROCEDURE — 87070 CULTURE OTHR SPECIMN AEROBIC: CPT

## 2023-03-26 PROCEDURE — 6360000004 HC RX CONTRAST MEDICATION: Performed by: SURGERY

## 2023-03-26 PROCEDURE — 2500000003 HC RX 250 WO HCPCS: Performed by: STUDENT IN AN ORGANIZED HEALTH CARE EDUCATION/TRAINING PROGRAM

## 2023-03-26 PROCEDURE — 86901 BLOOD TYPING SEROLOGIC RH(D): CPT

## 2023-03-26 PROCEDURE — 2720000010 HC SURG SUPPLY STERILE: Performed by: SURGERY

## 2023-03-26 PROCEDURE — 86900 BLOOD TYPING SEROLOGIC ABO: CPT

## 2023-03-26 PROCEDURE — 80048 BASIC METABOLIC PNL TOTAL CA: CPT

## 2023-03-26 PROCEDURE — C9113 INJ PANTOPRAZOLE SODIUM, VIA: HCPCS

## 2023-03-26 PROCEDURE — 85018 HEMOGLOBIN: CPT

## 2023-03-26 PROCEDURE — 71260 CT THORAX DX C+: CPT

## 2023-03-26 PROCEDURE — 93005 ELECTROCARDIOGRAM TRACING: CPT

## 2023-03-26 PROCEDURE — 74177 CT ABD & PELVIS W/CONTRAST: CPT

## 2023-03-26 PROCEDURE — 2709999900 HC NON-CHARGEABLE SUPPLY: Performed by: SURGERY

## 2023-03-26 PROCEDURE — 36415 COLL VENOUS BLD VENIPUNCTURE: CPT

## 2023-03-26 PROCEDURE — 6360000002 HC RX W HCPCS: Performed by: STUDENT IN AN ORGANIZED HEALTH CARE EDUCATION/TRAINING PROGRAM

## 2023-03-26 PROCEDURE — 85025 COMPLETE CBC W/AUTO DIFF WBC: CPT

## 2023-03-26 PROCEDURE — 86850 RBC ANTIBODY SCREEN: CPT

## 2023-03-26 PROCEDURE — 3700000001 HC ADD 15 MINUTES (ANESTHESIA): Performed by: SURGERY

## 2023-03-26 PROCEDURE — 31500 INSERT EMERGENCY AIRWAY: CPT

## 2023-03-26 PROCEDURE — 87641 MR-STAPH DNA AMP PROBE: CPT

## 2023-03-26 PROCEDURE — 96375 TX/PRO/DX INJ NEW DRUG ADDON: CPT

## 2023-03-26 PROCEDURE — 2580000003 HC RX 258: Performed by: SURGERY

## 2023-03-26 PROCEDURE — 3700000000 HC ANESTHESIA ATTENDED CARE: Performed by: SURGERY

## 2023-03-26 PROCEDURE — 5A1945Z RESPIRATORY VENTILATION, 24-96 CONSECUTIVE HOURS: ICD-10-PCS | Performed by: FAMILY MEDICINE

## 2023-03-26 PROCEDURE — 3600000012 HC SURGERY LEVEL 2 ADDTL 15MIN: Performed by: SURGERY

## 2023-03-26 PROCEDURE — 2500000003 HC RX 250 WO HCPCS

## 2023-03-26 PROCEDURE — 83735 ASSAY OF MAGNESIUM: CPT

## 2023-03-26 PROCEDURE — 72126 CT NECK SPINE W/DYE: CPT

## 2023-03-26 PROCEDURE — 6360000002 HC RX W HCPCS: Performed by: ANESTHESIOLOGY

## 2023-03-26 PROCEDURE — 85730 THROMBOPLASTIN TIME PARTIAL: CPT

## 2023-03-26 PROCEDURE — 2500000003 HC RX 250 WO HCPCS: Performed by: ANESTHESIOLOGY

## 2023-03-26 RX ORDER — SODIUM CHLORIDE 9 MG/ML
INJECTION, SOLUTION INTRAVENOUS PRN
Status: COMPLETED | OUTPATIENT
Start: 2023-03-26 | End: 2023-03-26

## 2023-03-26 RX ORDER — ROCURONIUM BROMIDE 10 MG/ML
INJECTION, SOLUTION INTRAVENOUS PRN
Status: DISCONTINUED | OUTPATIENT
Start: 2023-03-26 | End: 2023-03-26 | Stop reason: SDUPTHER

## 2023-03-26 RX ORDER — SODIUM CHLORIDE, SODIUM LACTATE, POTASSIUM CHLORIDE, CALCIUM CHLORIDE 600; 310; 30; 20 MG/100ML; MG/100ML; MG/100ML; MG/100ML
INJECTION, SOLUTION INTRAVENOUS CONTINUOUS PRN
Status: DISCONTINUED | OUTPATIENT
Start: 2023-03-26 | End: 2023-03-26 | Stop reason: SDUPTHER

## 2023-03-26 RX ORDER — ETOMIDATE 2 MG/ML
INJECTION INTRAVENOUS DAILY PRN
Status: COMPLETED | OUTPATIENT
Start: 2023-03-26 | End: 2023-03-26

## 2023-03-26 RX ORDER — 0.9 % SODIUM CHLORIDE 0.9 %
1000 INTRAVENOUS SOLUTION INTRAVENOUS ONCE
Status: COMPLETED | OUTPATIENT
Start: 2023-03-26 | End: 2023-03-26

## 2023-03-26 RX ORDER — PANTOPRAZOLE SODIUM 40 MG/10ML
40 INJECTION, POWDER, LYOPHILIZED, FOR SOLUTION INTRAVENOUS DAILY
Status: DISCONTINUED | OUTPATIENT
Start: 2023-03-26 | End: 2023-03-28 | Stop reason: ALTCHOICE

## 2023-03-26 RX ORDER — FENTANYL CITRATE-0.9 % NACL/PF 10 MCG/ML
25-200 PLASTIC BAG, INJECTION (ML) INTRAVENOUS CONTINUOUS
Status: DISCONTINUED | OUTPATIENT
Start: 2023-03-26 | End: 2023-04-01 | Stop reason: ALTCHOICE

## 2023-03-26 RX ORDER — EPHEDRINE SULFATE/0.9% NACL/PF 50 MG/5 ML
SYRINGE (ML) INTRAVENOUS PRN
Status: DISCONTINUED | OUTPATIENT
Start: 2023-03-26 | End: 2023-03-26 | Stop reason: SDUPTHER

## 2023-03-26 RX ORDER — MIDAZOLAM HYDROCHLORIDE 1 MG/ML
INJECTION INTRAMUSCULAR; INTRAVENOUS PRN
Status: DISCONTINUED | OUTPATIENT
Start: 2023-03-26 | End: 2023-03-26 | Stop reason: SDUPTHER

## 2023-03-26 RX ORDER — SODIUM CHLORIDE 9 MG/ML
INJECTION, SOLUTION INTRAVENOUS CONTINUOUS PRN
Status: COMPLETED | OUTPATIENT
Start: 2023-03-26 | End: 2023-03-26

## 2023-03-26 RX ORDER — DEXMEDETOMIDINE HYDROCHLORIDE 4 UG/ML
.1-1.5 INJECTION, SOLUTION INTRAVENOUS CONTINUOUS
Status: DISCONTINUED | OUTPATIENT
Start: 2023-03-26 | End: 2023-04-01 | Stop reason: ALTCHOICE

## 2023-03-26 RX ORDER — FENTANYL CITRATE 50 UG/ML
INJECTION, SOLUTION INTRAMUSCULAR; INTRAVENOUS
Status: COMPLETED
Start: 2023-03-26 | End: 2023-03-26

## 2023-03-26 RX ORDER — NOREPINEPHRINE BIT/0.9 % NACL 16MG/250ML
1-100 INFUSION BOTTLE (ML) INTRAVENOUS CONTINUOUS
Status: DISCONTINUED | OUTPATIENT
Start: 2023-03-26 | End: 2023-04-01 | Stop reason: ALTCHOICE

## 2023-03-26 RX ORDER — FENTANYL CITRATE 50 UG/ML
25 INJECTION, SOLUTION INTRAMUSCULAR; INTRAVENOUS ONCE
Status: COMPLETED | OUTPATIENT
Start: 2023-03-26 | End: 2023-03-26

## 2023-03-26 RX ORDER — SUCCINYLCHOLINE CHLORIDE 20 MG/ML
INJECTION INTRAMUSCULAR; INTRAVENOUS DAILY PRN
Status: COMPLETED | OUTPATIENT
Start: 2023-03-26 | End: 2023-03-26

## 2023-03-26 RX ORDER — SODIUM CHLORIDE, SODIUM LACTATE, POTASSIUM CHLORIDE, AND CALCIUM CHLORIDE .6; .31; .03; .02 G/100ML; G/100ML; G/100ML; G/100ML
500 INJECTION, SOLUTION INTRAVENOUS ONCE
Status: COMPLETED | OUTPATIENT
Start: 2023-03-26 | End: 2023-03-26

## 2023-03-26 RX ORDER — FENTANYL CITRATE 50 UG/ML
INJECTION, SOLUTION INTRAMUSCULAR; INTRAVENOUS PRN
Status: DISCONTINUED | OUTPATIENT
Start: 2023-03-26 | End: 2023-03-26 | Stop reason: SDUPTHER

## 2023-03-26 RX ORDER — NOREPINEPHRINE BIT/0.9 % NACL 16MG/250ML
INFUSION BOTTLE (ML) INTRAVENOUS
Status: COMPLETED
Start: 2023-03-26 | End: 2023-03-26

## 2023-03-26 RX ADMIN — IOPAMIDOL 80 ML: 755 INJECTION, SOLUTION INTRAVENOUS at 13:08

## 2023-03-26 RX ADMIN — Medication 100 MG: at 10:29

## 2023-03-26 RX ADMIN — SODIUM CHLORIDE 2000 ML: 9 INJECTION, SOLUTION INTRAVENOUS at 11:44

## 2023-03-26 RX ADMIN — PANTOPRAZOLE SODIUM 40 MG: 40 INJECTION, POWDER, LYOPHILIZED, FOR SOLUTION INTRAVENOUS at 21:32

## 2023-03-26 RX ADMIN — FENTANYL CITRATE 25 MCG: 0.05 INJECTION, SOLUTION INTRAMUSCULAR; INTRAVENOUS at 10:20

## 2023-03-26 RX ADMIN — Medication 25 MCG/HR: at 14:19

## 2023-03-26 RX ADMIN — Medication 5 MCG/MIN: at 22:50

## 2023-03-26 RX ADMIN — SODIUM CHLORIDE: 9 INJECTION, SOLUTION INTRAVENOUS at 18:49

## 2023-03-26 RX ADMIN — ETOMIDATE 20 MG: 2 INJECTION INTRAVENOUS at 10:28

## 2023-03-26 RX ADMIN — SODIUM CHLORIDE, POTASSIUM CHLORIDE, SODIUM LACTATE AND CALCIUM CHLORIDE: 600; 310; 30; 20 INJECTION, SOLUTION INTRAVENOUS at 10:45

## 2023-03-26 RX ADMIN — SODIUM CHLORIDE 1000 ML: 9 INJECTION, SOLUTION INTRAVENOUS at 21:12

## 2023-03-26 RX ADMIN — ROCURONIUM BROMIDE 50 MG: 10 INJECTION INTRAVENOUS at 10:51

## 2023-03-26 RX ADMIN — Medication 10 MG: at 11:07

## 2023-03-26 RX ADMIN — MIDAZOLAM 2 MG: 1 INJECTION INTRAMUSCULAR; INTRAVENOUS at 10:52

## 2023-03-26 RX ADMIN — Medication 2000 MG: at 11:00

## 2023-03-26 RX ADMIN — FENTANYL CITRATE 100 MCG: 50 INJECTION, SOLUTION INTRAMUSCULAR; INTRAVENOUS at 11:03

## 2023-03-26 RX ADMIN — Medication 20 MG: at 11:18

## 2023-03-26 RX ADMIN — SODIUM CHLORIDE, POTASSIUM CHLORIDE, SODIUM LACTATE AND CALCIUM CHLORIDE 500 ML: 600; 310; 30; 20 INJECTION, SOLUTION INTRAVENOUS at 20:18

## 2023-03-26 RX ADMIN — FENTANYL CITRATE 25 MCG: 50 INJECTION, SOLUTION INTRAMUSCULAR; INTRAVENOUS at 10:20

## 2023-03-26 RX ADMIN — Medication 0.2 MCG/KG/HR: at 14:15

## 2023-03-26 ASSESSMENT — PAIN SCALES - GENERAL: PAINLEVEL_OUTOF10: 10

## 2023-03-26 ASSESSMENT — PULMONARY FUNCTION TESTS
PIF_VALUE: 19
PIF_VALUE: 16
PIF_VALUE: 21

## 2023-03-26 NOTE — ED NOTES
Level 2 trauma upgraded to Level 1 trauma per Dr. Armando Lemus and Dr. Adams Leone due to patient hypotensive.      Neal Powers RN  03/26/23 9537

## 2023-03-26 NOTE — ED PROVIDER NOTES
US Ed Fast Abdomen Limited    Date/Time: 3/26/2023 10:40 AM  Performed by: Merry Sanchez MD  Authorized by: Laureen Fields MD     Procedure details:     Indications comment:  Blunt trauma    Assessment for:  Intra-abdominal fluid  Comments:      FAST negative for pathologic free fluid.          Merry Sanchez MD  Resident  03/26/23 8781
who presents to the emergency department for evaluation of fall on Plavix. History is limited due to the acuity of the patient's condition. REVIEW OF SYSTEMS   Review of Systems   Unable to perform ROS: Acuity of condition       PAST MEDICAL AND SURGICAL HISTORY     Past Medical History:   Diagnosis Date    Anti-phospholipid antibody syndrome ([facility] [address]     Cancer ([facility] [address]     uterine     Memory disorder        Past Surgical History:   Procedure Laterality Date    EYE SURGERY Bilateral     corneal transplants     FOOT SURGERY      HYSTERECTOMY (CERVIX STATUS UNKNOWN)       Unable to confirm at this moment, Except as available on EMR. MEDICATIONS   No current facility-administered medications for this encounter. Current Outpatient Medications:     midodrine (PROAMATINE) 5 MG tablet, Take 2 tablets by mouth 3 times daily, Disp: 90 tablet, Rfl: 0    carbidopa-levodopa (SINEMET)  MG per tablet, Take 1.5 tablets by mouth 3 times daily, Disp: 135 tablet, Rfl: 3    acetaminophen (TYLENOL) 500 MG tablet, Take 500 mg by mouth in the morning and 500 mg before bedtime. , Disp: , Rfl:     amoxicillin (AMOXIL) 500 MG capsule, Take 500 mg by mouth (Patient not taking: Reported on 12/12/2022), Disp: , Rfl:     atorvastatin (LIPITOR) 40 MG tablet, , Disp: , Rfl:     Hypromellose, PF, (RETAINE [facility]MC) 0.3 % SOLN, Apply to eye, Disp: , Rfl:     prednisoLONE sodium phosphate (INFLAMASE FORTE) 1 % ophthalmic solution, 1 drop 4 times daily, Disp: , Rfl:     hydroCHLOROthiazide (MICROZIDE) 12.5 MG capsule, Take 12.5 mg by mouth daily (Patient not taking: Reported on 12/12/2022), Disp: , Rfl:     clopidogrel (PLAVIX) 75 MG tablet, Take 75 mg by mouth daily, Disp: , Rfl:     cycloSPORINE (RESTASIS) 0.05 % ophthalmic emulsion, 1 drop 2 times daily , Disp: , Rfl:     TRAZODONE HCL PO, Take 50 mg by mouth  (Patient not taking: Reported on 12/12/2022), Disp: , Rfl:     prednisoLONE acetate (PRED FORTE) 1 % ophthalmic suspension, 1

## 2023-03-26 NOTE — ED NOTES
1 unit of uncrossmatched blood being administered at this time per request from Dr. Naida Albert.      Katelynn Montanez RN  03/26/23 1132

## 2023-03-26 NOTE — H&P
[address] [id number]                              HISTORY AND PHYSICAL    PATIENT NAME: January Washington                   :        1940  MED REC NO:   [id number]                           ROOM:       0008  ACCOUNT NO:   [de-identified]                           ADMIT DATE: 2023  PROVIDER:     Piotr Varma. Thai Robb MD    This was a level I trauma. Time called was roughly 10:13 AM  Time  seen was 10:16 AM    MECHANISM:  Trauma by fall. HISTORY OF PRESENT ILLNESS:  The patient is an 79-year-old female who  was at home and fell, the exact nature is uncertain, but apparently may  have had a syncopal episode. She was initially a level II trauma,  however, in the emergency room her pressure dropped down into the 60  systolic and she was changed to a level I.  Dr. Eliseo Cohen is on trauma call  today, I was back up. He was in the operating room, thus I responded. On entrance into the trauma bay, the patient was screaming secondary to  pain. She had significant amount of blood about her face, scalp, hair  and did have a C-collar in place. Per report from the emergency room, a  FAST exam of the abdomen was negative. She had no other definite signs  of injury, but I went up to the head of the bed, began cutting why some  of the hair it became clear she had a large flap, the scalping wound of  the scalp was active arterial bleeding. There was no way to control  this in the emergency room and she again had lost a significant amount  of blood. The patient was then intubated and brought up urgently to the  operating room for control of the scalp hemorrhage. PAST MEDICAL HISTORY:  Coming strictly from the chart, but apparently  has a history of aortic valvular disease/aortic stenosis.   She has a  history of uterine cancer, history of hyperlipidemia, obstructive sleep  apnea, history of frequent falls, history of possible mild

## 2023-03-26 NOTE — ED TRIAGE NOTES
Patient arrived to ED via [facility] c/c fall on blood thinners. Level 2 trauma activated at this time. EMS reports patient was in the bathroom waiting for her  to come help her. Reports she decided to go on without him and fell. Patient has large head laceration to top of head with uncontrolled bleeding. EMS reports bleeding has slowed down since their arrival on scene. Patient reportedly takes Plavix. C-collar placed by EMS. EMS reports syncopal episode and vomiting en route. Upon arrival to ED, patient is alert and oriented x3. Unoriented to location. Patient has laceration to the right hand and bruise to the right lower leg. Patient reports neck pain. IV accesses established. Dr. Cornelius Guerra, Dr. Janette Zhong, and Dr. Javier Bustillos at bedside for patient assessment.

## 2023-03-26 NOTE — CONSULTS
Temp src Pulse Resp SpO2 Height Weight   03/26/23 1815 (!) 85/54 98.2 °F (36.8 °C) Bladder (!) 105 12 97 % -- --   03/26/23 1800 89/64 -- -- (!) 105 10 97 % -- --   03/26/23 1745 (!) 84/59 -- -- 98 10 97 % -- --   03/26/23 1730 82/65 -- -- 100 10 97 % -- --   03/26/23 1715 (!) 80/57 -- -- 76 12 98 % -- --   03/26/23 1700 (!) 90/57 -- -- 78 12 98 % -- --   03/26/23 1645 (!) 62/47 -- -- 85 12 97 % -- --   03/26/23 1633 -- -- -- 82 12 98 % -- --   03/26/23 1630 84/61 -- -- 83 12 99 % -- --   03/26/23 1615 84/65 -- -- 81 12 100 % -- --   03/26/23 1600 98/74 -- -- 82 12 100 % -- --   03/26/23 1545 98/65 -- -- 85 12 100 % -- --   03/26/23 1530 114/86 -- -- 86 16 100 % -- --   03/26/23 1515 104/83 -- -- 85 12 100 % -- --   03/26/23 1500 112/75 -- -- 86 14 100 % -- --   03/26/23 1445 (!) 105/51 -- -- 86 18 100 % 5' 6\" (1.676 m) 152 lb 5.4 oz (69.1 kg)   03/26/23 1430 121/85 -- -- 82 12 100 % -- --   03/26/23 1415 119/86 (!) 95 °F (35 °C) Bladder 80 13 100 % -- --   03/26/23 1400 131/87 -- -- 77 14 100 % -- --   03/26/23 1345 124/71 -- -- 76 12 100 % -- --   03/26/23 1330 134/71 -- -- 80 13 100 % -- --   03/26/23 1315 (!) 156/48 -- -- -- -- -- -- --   03/26/23 1230 108/60 -- -- 80 13 100 % -- --   03/26/23 1220 -- -- -- 78 12 100 % -- --   03/26/23 1215 (!) 87/59 -- -- 79 13 100 % -- --   03/26/23 1200 (!) 70/57 (!) 93 °F (33.9 °C) Rectal 83 13 100 % -- --   03/26/23 1145 (!) 57/44 -- -- 90 12 100 % -- --     No intake/output data recorded. Wt Readings from Last 3 Encounters:   03/26/23 152 lb 5.4 oz (69.1 kg)   12/12/22 149 lb (67.6 kg)   08/15/22 154 lb (69.9 kg)      Body mass index is 24.59 kg/m². General:   This is an elderly female lying in bed  HEENT:  normocephalic and atraumatic. No scleral icterus. PERR. Bandaging in place over head with no evidence of discoloration or discharge. Neck: supple. No Thyromegaly. Lungs: clear to auscultation. No retractions.  Sedated with mechanical ventilation in

## 2023-03-26 NOTE — PROCEDURES
Intubation    Date/Time: 3/26/2023 10:46 AM  Performed by: Joe Lopez MD  Authorized by: Kinjal Motley MD     Consent:     Consent obtained:  Verbal    Consent given by:  Patient  Universal protocol:     Required blood products, implants, devices, and special equipment available: yes      Immediately prior to procedure, a time out was called: yes      Patient identity confirmed:  Verbally with patient  Pre-procedure details:     Indication: predicted clinical deterioration      Patient status:  Awake    Look externally comment:  Head trauma, in c-collar    Mouth opening - incisor distance:  3 or more finger widths    Hyoid-mental distance: 3 or more finger widths      Hyoid-thyroid distance: 2 or more finger widths      Mallampati score:  II    Obstruction: none      Neck mobility: reduced (in c collar)      Pharmacologic strategy: RSI      Induction agents:  Etomidate    Paralytics:  Succinylcholine  Procedure details:     Preoxygenation:  Bag valve mask    Intubation method:  Oral    Intubation technique: video assisted      Laryngoscope blade:   Mac 3    Grade view: I      Tube size (mm):  7.5    Tube type:  Cuffed    Number of attempts:  1    Tube visualized through cords: yes    Placement assessment:     ETT at teeth/gumline (cm):  23    Tube secured with:  ETT moran    Breath sounds:  Equal    Placement verification: chest rise, colorimetric ETCO2, direct visualization and equal breath sounds    Post-procedure details:     Procedure completion:  Tolerated well, no immediate complications

## 2023-03-26 NOTE — ANESTHESIA PRE PROCEDURE
Department of Anesthesiology  Preprocedure Note       Name:  Kel Gonzalez   Age:  79 y.o.  :  1940                                          MRN:  [id number]         Date:  3/26/2023      Surgeon: Karla Marley):  Tanya Ruiz MD    Procedure: Procedure(s):  FACIAL LACERATION REPAIR    Medications prior to admission:   Prior to Admission medications    Medication Sig Start Date End Date Taking? Authorizing Provider   midodrine (PROAMATINE) 5 MG tablet Take 2 tablets by mouth 3 times daily 3/24/23   Toure Harps, APRN - CNP   carbidopa-levodopa (SINEMET)  MG per tablet Take 1.5 tablets by mouth 3 times daily 22   Darrall Meter, APRN - CNP   acetaminophen (TYLENOL) 500 MG tablet Take 500 mg by mouth in the morning and 500 mg before bedtime. Historical Provider, MD   amoxicillin (AMOXIL) 500 MG capsule Take 500 mg by mouth  Patient not taking: Reported on 2022    Historical Provider, MD   atorvastatin (LIPITOR) 40 MG tablet  3/21/22   Historical Provider, MD   Hypromellose PF, (RETAINE [facility]MC) 0.3 % SOLN Apply to eye    Historical Provider, MD   prednisoLONE sodium phosphate (INFLAMASE FORTE) 1 % ophthalmic solution 1 drop 4 times daily    Historical Provider, MD   hydroCHLOROthiazide (MICROZIDE) 12.5 MG capsule Take 12.5 mg by mouth daily  Patient not taking: Reported on 2022    Historical Provider, MD   clopidogrel (PLAVIX) 75 MG tablet Take 75 mg by mouth daily    Historical Provider, MD   cycloSPORINE (RESTASIS) 0.05 % ophthalmic emulsion 1 drop 2 times daily     Historical Provider, MD   TRAZODONE HCL PO Take 50 mg by mouth   Patient not taking: Reported on 2022    Historical Provider, MD   prednisoLONE acetate (PRED FORTE) 1 % ophthalmic suspension 1 drop 4 times daily. Historical Provider, MD   multivitamin [facility]) per tablet Take 1 tablet by mouth daily. Historical Provider, MD   calcium carbonate-vitamin D 600-200 MG-UNIT TABS Take  by mouth.

## 2023-03-26 NOTE — OP NOTE
[address] [place] [address]                                OPERATIVE REPORT    PATIENT NAME: Shai Champagne                   :        1940  MED REC NO:   [id number]                           ROOM:       0008  ACCOUNT NO:   [de-identified]                           ADMIT DATE: 2023  PROVIDER:     Juarez Espinal MD    DATE OF PROCEDURE:  2023    PREOPERATIVE DIAGNOSIS:  Degloving scalp flap wound to the scalp with  active hemorrhage. POSTOPERATIVE DIAGNOSIS:  Degloving scalp flap wound to the scalp with  active hemorrhage. OPERATIONS:  1. Control of active scalp hemorrhage. 2.  Closure of degloving scalping flap wound of the scalp, 7 x 7 cm. SURGEON:  Juarez Quintero. MD Drew    ANESTHESIA:  General.    COMPLICATIONS:  None. INTRAOPERATIVE BLOOD LOSS:  50 mL. INDICATIONS FOR PROCEDURE:  The patient is an 79-year-old female who  presents to the emergency room, fell, had significant flap scalp  laceration with hypotension and was brought to surgery urgently for  control of hemorrhage. FINDINGS:  The patient had several active arterial bleeders ongoing. They were quickly cauterized. The wound was irrigated and close. DESCRIPTION OF PROCEDURE:  The patient was brought into the operating  suite, placed supine on the operating room table. With the patient  already intubated, we quickly shakes the scalp and then doused the area  with Betadine. We then prepped. We then draped the area and then the  scalp flap was grasped and there was active arterial bleeders pulsating  and squirting that were cauterized rather urgently. We continued with  cauterization of the flap and deep to the flap, the bone scalp was  completely intact. Simmie Juanita had been disrupted. We continued this  cauterization for several minutes as it would just keep oozing, likely  due to her Plavix.   We then irrigated with Irrisept,

## 2023-03-26 NOTE — ED NOTES
Patient rolled with c-spine maintained. Patient reports midline T spine tenderness. New c-collar being placed at this time.      Cailin Armstrong RN  03/26/23 8713

## 2023-03-26 NOTE — FLOWSHEET NOTE
1135 - arrived to ICU care assumed by Dwight Mcknight RN. Patient intubated and on the ventilator, C-collar in place. Attempting to obtain BP    1148 - Dr. Liu Labor called and hypotension. Orders for 2 units of prbc unmatched, type and screen, consult to intensivist for ventilator management. Need stat pan scan as soon as stable enough for transport. 1200 - 1st u of unmatched prbc started    1210 - Dr. Jean Allen at bedside. 1212 - right proximal hand laceration 3 cm. Right lateral hand laceration 6 cm.     1215 - Temp 33.9C, conor hugger applied.     1225 - family at bedside being updated by Dwight Mcknight, GHASSAN    7157- patient to CT scan at this time

## 2023-03-27 ENCOUNTER — APPOINTMENT (OUTPATIENT)
Dept: GENERAL RADIOLOGY | Age: 83
DRG: 579 | End: 2023-03-27
Payer: MEDICARE

## 2023-03-27 LAB
ALBUMIN SERPL BCG-MCNC: 2.7 G/DL (ref 3.5–5.1)
ALP SERPL-CCNC: 49 U/L (ref 38–126)
ALT SERPL W/O P-5'-P-CCNC: 9 U/L (ref 11–66)
ANION GAP SERPL CALC-SCNC: 7 MEQ/L (ref 8–16)
AST SERPL-CCNC: 19 U/L (ref 5–40)
BILIRUB SERPL-MCNC: 1.6 MG/DL (ref 0.3–1.2)
BUN SERPL-MCNC: 17 MG/DL (ref 7–22)
CALCIUM SERPL-MCNC: 8 MG/DL (ref 8.5–10.5)
CHLORIDE SERPL-SCNC: 110 MEQ/L (ref 98–111)
CO2 SERPL-SCNC: 22 MEQ/L (ref 23–33)
CREAT SERPL-MCNC: 0.5 MG/DL (ref 0.4–1.2)
DEPRECATED RDW RBC AUTO: 55.6 FL (ref 35–45)
EKG ATRIAL RATE: 91 BPM
EKG P AXIS: 45 DEGREES
EKG P-R INTERVAL: 172 MS
EKG Q-T INTERVAL: 378 MS
EKG QRS DURATION: 66 MS
EKG QTC CALCULATION (BAZETT): 464 MS
EKG R AXIS: 50 DEGREES
EKG T AXIS: 66 DEGREES
EKG VENTRICULAR RATE: 91 BPM
ERYTHROCYTE [DISTWIDTH] IN BLOOD BY AUTOMATED COUNT: 17 % (ref 11.5–14.5)
GFR SERPL CREATININE-BSD FRML MDRD: > 60 ML/MIN/1.73M2
GLUCOSE SERPL-MCNC: 133 MG/DL (ref 70–108)
HCT VFR BLD AUTO: 35.1 % (ref 37–47)
HGB BLD-MCNC: 11.3 GM/DL (ref 12–16)
LACTATE SERPL-SCNC: 2.2 MMOL/L (ref 0.5–2)
MAGNESIUM SERPL-MCNC: 1.6 MG/DL (ref 1.6–2.4)
MCH RBC QN AUTO: 29.1 PG (ref 26–33)
MCHC RBC AUTO-ENTMCNC: 32.2 GM/DL (ref 32.2–35.5)
MCV RBC AUTO: 90.5 FL (ref 81–99)
PATHOLOGIST REVIEW: ABNORMAL
PHOSPHATE SERPL-MCNC: 2.7 MG/DL (ref 2.4–4.7)
PLATELET # BLD AUTO: 94 THOU/MM3 (ref 130–400)
PMV BLD AUTO: 10.3 FL (ref 9.4–12.4)
POTASSIUM SERPL-SCNC: 3.8 MEQ/L (ref 3.5–5.2)
PROT SERPL-MCNC: 4.7 G/DL (ref 6.1–8)
RBC # BLD AUTO: 3.88 MILL/MM3 (ref 4.2–5.4)
SCAN OF BLOOD SMEAR: NORMAL
SODIUM SERPL-SCNC: 139 MEQ/L (ref 135–145)
WBC # BLD AUTO: 12.8 THOU/MM3 (ref 4.8–10.8)

## 2023-03-27 PROCEDURE — 83735 ASSAY OF MAGNESIUM: CPT

## 2023-03-27 PROCEDURE — 87040 BLOOD CULTURE FOR BACTERIA: CPT

## 2023-03-27 PROCEDURE — 99232 SBSQ HOSP IP/OBS MODERATE 35: CPT | Performed by: INTERNAL MEDICINE

## 2023-03-27 PROCEDURE — 36415 COLL VENOUS BLD VENIPUNCTURE: CPT

## 2023-03-27 PROCEDURE — 93010 ELECTROCARDIOGRAM REPORT: CPT | Performed by: INTERNAL MEDICINE

## 2023-03-27 PROCEDURE — 6370000000 HC RX 637 (ALT 250 FOR IP)

## 2023-03-27 PROCEDURE — C9113 INJ PANTOPRAZOLE SODIUM, VIA: HCPCS

## 2023-03-27 PROCEDURE — 83605 ASSAY OF LACTIC ACID: CPT

## 2023-03-27 PROCEDURE — 6360000002 HC RX W HCPCS: Performed by: NURSE PRACTITIONER

## 2023-03-27 PROCEDURE — 2580000003 HC RX 258: Performed by: NURSE PRACTITIONER

## 2023-03-27 PROCEDURE — 6370000000 HC RX 637 (ALT 250 FOR IP): Performed by: NURSE PRACTITIONER

## 2023-03-27 PROCEDURE — 80053 COMPREHEN METABOLIC PANEL: CPT

## 2023-03-27 PROCEDURE — 1200000000 HC SEMI PRIVATE

## 2023-03-27 PROCEDURE — P9047 ALBUMIN (HUMAN), 25%, 50ML: HCPCS | Performed by: NURSE PRACTITIONER

## 2023-03-27 PROCEDURE — 94761 N-INVAS EAR/PLS OXIMETRY MLT: CPT

## 2023-03-27 PROCEDURE — 94003 VENT MGMT INPAT SUBQ DAY: CPT

## 2023-03-27 PROCEDURE — 6360000002 HC RX W HCPCS

## 2023-03-27 PROCEDURE — 84100 ASSAY OF PHOSPHORUS: CPT

## 2023-03-27 PROCEDURE — 71045 X-RAY EXAM CHEST 1 VIEW: CPT

## 2023-03-27 PROCEDURE — 1200000003 HC TELEMETRY R&B

## 2023-03-27 PROCEDURE — 85027 COMPLETE CBC AUTOMATED: CPT

## 2023-03-27 RX ORDER — MAGNESIUM SULFATE IN WATER 40 MG/ML
2000 INJECTION, SOLUTION INTRAVENOUS ONCE
Status: COMPLETED | OUTPATIENT
Start: 2023-03-27 | End: 2023-03-27

## 2023-03-27 RX ORDER — ALBUMIN (HUMAN) 12.5 G/50ML
25 SOLUTION INTRAVENOUS ONCE
Status: COMPLETED | OUTPATIENT
Start: 2023-03-27 | End: 2023-03-27

## 2023-03-27 RX ORDER — 0.9 % SODIUM CHLORIDE 0.9 %
500 INTRAVENOUS SOLUTION INTRAVENOUS ONCE
Status: COMPLETED | OUTPATIENT
Start: 2023-03-27 | End: 2023-03-27

## 2023-03-27 RX ORDER — GINSENG 100 MG
CAPSULE ORAL 3 TIMES DAILY
Status: DISCONTINUED | OUTPATIENT
Start: 2023-03-27 | End: 2023-04-13 | Stop reason: HOSPADM

## 2023-03-27 RX ORDER — MULTIVITAMIN WITH IRON
1 TABLET ORAL DAILY
Status: DISCONTINUED | OUTPATIENT
Start: 2023-03-27 | End: 2023-04-13 | Stop reason: HOSPADM

## 2023-03-27 RX ORDER — BACITRACIN, NEOMYCIN, POLYMYXIN B 400; 3.5; 5 [USP'U]/G; MG/G; [USP'U]/G
OINTMENT TOPICAL 2 TIMES DAILY
Status: DISCONTINUED | OUTPATIENT
Start: 2023-03-27 | End: 2023-04-13 | Stop reason: HOSPADM

## 2023-03-27 RX ORDER — ATORVASTATIN CALCIUM 40 MG/1
40 TABLET, FILM COATED ORAL NIGHTLY
Status: DISCONTINUED | OUTPATIENT
Start: 2023-03-27 | End: 2023-04-13 | Stop reason: HOSPADM

## 2023-03-27 RX ORDER — ACETAMINOPHEN 500 MG
500 TABLET ORAL 2 TIMES DAILY
Status: DISCONTINUED | OUTPATIENT
Start: 2023-03-27 | End: 2023-04-13 | Stop reason: HOSPADM

## 2023-03-27 RX ORDER — MIDODRINE HYDROCHLORIDE 10 MG/1
10 TABLET ORAL 3 TIMES DAILY
Status: DISCONTINUED | OUTPATIENT
Start: 2023-03-27 | End: 2023-04-08

## 2023-03-27 RX ADMIN — Medication 1 TABLET: at 23:09

## 2023-03-27 RX ADMIN — PANTOPRAZOLE SODIUM 40 MG: 40 INJECTION, POWDER, LYOPHILIZED, FOR SOLUTION INTRAVENOUS at 10:09

## 2023-03-27 RX ADMIN — ATORVASTATIN CALCIUM 40 MG: 40 TABLET, FILM COATED ORAL at 23:10

## 2023-03-27 RX ADMIN — ALBUMIN (HUMAN) 25 G: 0.25 INJECTION, SOLUTION INTRAVENOUS at 09:57

## 2023-03-27 RX ADMIN — ACETAMINOPHEN 500 MG: 500 TABLET ORAL at 23:43

## 2023-03-27 RX ADMIN — CARBIDOPA AND LEVODOPA 1.5 TABLET: 25; 100 TABLET ORAL at 23:08

## 2023-03-27 RX ADMIN — MAGNESIUM SULFATE HEPTAHYDRATE 2000 MG: 40 INJECTION, SOLUTION INTRAVENOUS at 10:59

## 2023-03-27 RX ADMIN — MIDODRINE HYDROCHLORIDE 10 MG: 10 TABLET ORAL at 23:10

## 2023-03-27 RX ADMIN — SODIUM CHLORIDE 500 ML: 9 INJECTION, SOLUTION INTRAVENOUS at 09:02

## 2023-03-27 ASSESSMENT — PULMONARY FUNCTION TESTS
PIF_VALUE: 16
PIF_VALUE: 17
PIF_VALUE: 17

## 2023-03-27 NOTE — ANESTHESIA POSTPROCEDURE EVALUATION
Department of Anesthesiology  Postprocedure Note    Patient: Memo Sol  MRN: [id number]  YOB: 1940  Date of evaluation: 3/27/2023      Procedure Summary     Date: 03/26/23 Room / Location: [facility] OR Luann / Emerson Munoz    Anesthesia Start: 3446 Anesthesia Stop: 1140    Procedure: FACIAL LACERATION REPAIR Diagnosis:       Laceration of head without foreign body, unspecified part of head, initial encounter      (Laceration of head without foreign body, unspecified part of head, initial encounter Amanda Krishna)    Surgeons: Chidi Frances MD Responsible Provider: Marilee Baer MD    Anesthesia Type: general ASA Status: 3 - Emergent          Anesthesia Type: No value filed. Davonte Phase I:      Davonte Phase II:        Anesthesia Post Evaluation    Patient location during evaluation: ICU  Patient participation: complete - patient participated  Level of consciousness: awake and confused  Pain score: 0  Airway patency: patent  Nausea & Vomiting: no vomiting and nausea  Complications: no  Cardiovascular status: hemodynamically stable  Respiratory status: acceptable, room air, spontaneous ventilation and nonlabored ventilation  Hydration status: stable  Comments: Patient sitting up in bed. She is awake and alert but is confused. No IV drips. Vital signs are stable. No apparent anesthesia complications.

## 2023-03-28 LAB
ANION GAP SERPL CALC-SCNC: 5 MEQ/L (ref 8–16)
BACTERIA SPEC AEROBE CULT: NORMAL
BACTERIA UR CULT: ABNORMAL
BUN SERPL-MCNC: 13 MG/DL (ref 7–22)
CALCIUM SERPL-MCNC: 7.7 MG/DL (ref 8.5–10.5)
CHLORIDE SERPL-SCNC: 110 MEQ/L (ref 98–111)
CO2 SERPL-SCNC: 25 MEQ/L (ref 23–33)
CREAT SERPL-MCNC: 0.5 MG/DL (ref 0.4–1.2)
DEPRECATED RDW RBC AUTO: 55.6 FL (ref 35–45)
ERYTHROCYTE [DISTWIDTH] IN BLOOD BY AUTOMATED COUNT: 16.7 % (ref 11.5–14.5)
GFR SERPL CREATININE-BSD FRML MDRD: > 60 ML/MIN/1.73M2
GLUCOSE SERPL-MCNC: 100 MG/DL (ref 70–108)
HCT VFR BLD AUTO: 25.3 % (ref 37–47)
HCT VFR BLD AUTO: 26.5 % (ref 37–47)
HGB BLD-MCNC: 8.1 GM/DL (ref 12–16)
HGB BLD-MCNC: 8.5 GM/DL (ref 12–16)
MCH RBC QN AUTO: 29.3 PG (ref 26–33)
MCHC RBC AUTO-ENTMCNC: 32 GM/DL (ref 32.2–35.5)
MCV RBC AUTO: 91.7 FL (ref 81–99)
ORGANISM: ABNORMAL
PLATELET # BLD AUTO: 76 THOU/MM3 (ref 130–400)
PMV BLD AUTO: 11 FL (ref 9.4–12.4)
POTASSIUM SERPL-SCNC: 3.2 MEQ/L (ref 3.5–5.2)
RBC # BLD AUTO: 2.76 MILL/MM3 (ref 4.2–5.4)
SODIUM SERPL-SCNC: 140 MEQ/L (ref 135–145)
WBC # BLD AUTO: 7.7 THOU/MM3 (ref 4.8–10.8)

## 2023-03-28 PROCEDURE — 92610 EVALUATE SWALLOWING FUNCTION: CPT

## 2023-03-28 PROCEDURE — 6370000000 HC RX 637 (ALT 250 FOR IP)

## 2023-03-28 PROCEDURE — 80048 BASIC METABOLIC PNL TOTAL CA: CPT

## 2023-03-28 PROCEDURE — 1200000000 HC SEMI PRIVATE

## 2023-03-28 PROCEDURE — 85018 HEMOGLOBIN: CPT

## 2023-03-28 PROCEDURE — 85027 COMPLETE CBC AUTOMATED: CPT

## 2023-03-28 PROCEDURE — 6370000000 HC RX 637 (ALT 250 FOR IP): Performed by: PHYSICIAN ASSISTANT

## 2023-03-28 PROCEDURE — 1200000003 HC TELEMETRY R&B

## 2023-03-28 PROCEDURE — 36415 COLL VENOUS BLD VENIPUNCTURE: CPT

## 2023-03-28 PROCEDURE — 6370000000 HC RX 637 (ALT 250 FOR IP): Performed by: NURSE PRACTITIONER

## 2023-03-28 PROCEDURE — C9113 INJ PANTOPRAZOLE SODIUM, VIA: HCPCS

## 2023-03-28 PROCEDURE — 92523 SPEECH SOUND LANG COMPREHEN: CPT

## 2023-03-28 PROCEDURE — APPNB15 APP NON BILLABLE TIME 0-15 MINS: Performed by: PHYSICIAN ASSISTANT

## 2023-03-28 PROCEDURE — 85014 HEMATOCRIT: CPT

## 2023-03-28 PROCEDURE — 6360000002 HC RX W HCPCS

## 2023-03-28 RX ORDER — POTASSIUM CHLORIDE 20 MEQ/1
40 TABLET, EXTENDED RELEASE ORAL PRN
Status: DISCONTINUED | OUTPATIENT
Start: 2023-03-28 | End: 2023-04-13 | Stop reason: HOSPADM

## 2023-03-28 RX ORDER — PANTOPRAZOLE SODIUM 40 MG/1
40 TABLET, DELAYED RELEASE ORAL
Status: DISCONTINUED | OUTPATIENT
Start: 2023-03-29 | End: 2023-04-13 | Stop reason: HOSPADM

## 2023-03-28 RX ORDER — POTASSIUM CHLORIDE 7.45 MG/ML
10 INJECTION INTRAVENOUS PRN
Status: DISCONTINUED | OUTPATIENT
Start: 2023-03-28 | End: 2023-04-13 | Stop reason: HOSPADM

## 2023-03-28 RX ADMIN — ATORVASTATIN CALCIUM 40 MG: 40 TABLET, FILM COATED ORAL at 20:33

## 2023-03-28 RX ADMIN — BACITRACIN: 500 OINTMENT TOPICAL at 15:24

## 2023-03-28 RX ADMIN — CARBIDOPA AND LEVODOPA 1.5 TABLET: 25; 100 TABLET ORAL at 09:08

## 2023-03-28 RX ADMIN — BACITRACIN: 500 OINTMENT TOPICAL at 20:34

## 2023-03-28 RX ADMIN — POTASSIUM CHLORIDE 40 MEQ: 1500 TABLET, EXTENDED RELEASE ORAL at 09:08

## 2023-03-28 RX ADMIN — Medication 1 TABLET: at 09:09

## 2023-03-28 RX ADMIN — ACETAMINOPHEN 500 MG: 500 TABLET ORAL at 20:33

## 2023-03-28 RX ADMIN — BACITRACIN ZINC NEOMYCIN SULFATE POLYMYXIN B SULFATE: 400; 3.5; 5 OINTMENT TOPICAL at 00:00

## 2023-03-28 RX ADMIN — BACITRACIN ZINC NEOMYCIN SULFATE POLYMYXIN B SULFATE: 400; 3.5; 5 OINTMENT TOPICAL at 20:34

## 2023-03-28 RX ADMIN — MIDODRINE HYDROCHLORIDE 10 MG: 10 TABLET ORAL at 09:09

## 2023-03-28 RX ADMIN — BACITRACIN ZINC NEOMYCIN SULFATE POLYMYXIN B SULFATE: 400; 3.5; 5 OINTMENT TOPICAL at 09:08

## 2023-03-28 RX ADMIN — CARBIDOPA AND LEVODOPA 1.5 TABLET: 25; 100 TABLET ORAL at 15:24

## 2023-03-28 RX ADMIN — PANTOPRAZOLE SODIUM 40 MG: 40 INJECTION, POWDER, LYOPHILIZED, FOR SOLUTION INTRAVENOUS at 09:08

## 2023-03-28 RX ADMIN — BACITRACIN: 500 OINTMENT TOPICAL at 00:00

## 2023-03-28 RX ADMIN — BACITRACIN: 500 OINTMENT TOPICAL at 09:07

## 2023-03-28 RX ADMIN — ACETAMINOPHEN 500 MG: 500 TABLET ORAL at 09:09

## 2023-03-28 RX ADMIN — CARBIDOPA AND LEVODOPA 1.5 TABLET: 25; 100 TABLET ORAL at 20:33

## 2023-03-28 ASSESSMENT — PAIN SCALES - GENERAL
PAINLEVEL_OUTOF10: 0
PAINLEVEL_OUTOF10: 0
PAINLEVEL_OUTOF10: 4

## 2023-03-28 ASSESSMENT — PAIN DESCRIPTION - DESCRIPTORS: DESCRIPTORS: ACHING

## 2023-03-28 ASSESSMENT — PAIN - FUNCTIONAL ASSESSMENT
PAIN_FUNCTIONAL_ASSESSMENT: 0-10
PAIN_FUNCTIONAL_ASSESSMENT: ACTIVITIES ARE NOT PREVENTED

## 2023-03-28 NOTE — PALLIATIVE CARE
LOC full/confusion        [x] 50%  Mainly sit/lie; Extensive disease; Can't do any work; Considerable assist; intake normal or reduced; LOC full/confusion        [] 40%  Mainly in bed; Extensive disease; Mainly assist; intake normal or reduced; LOC full/confusion         [] 30%  Bed Bound; Extensive disease; Total care; intake reduced; LOC full/confusion        [] 20%  Bed Bound; Extensive disease; Total care; intake minimal; Drowsy/coma        [] 10%  Bed Bound; Extensive disease; Total care; Mouth care only; Drowsy/coma        [] 0  Death        Goals of care evaluation:-        The patient goals of care are to provide comfort care/supportive services/palliation & relieve suffering:  Goals of care discussed with:  [] Patient independently  [x] Patient and Family  [] Family or Healthcare DPOA independently  [] Unable to discuss with patient, family/DPOA not present         Family/Patient Discussion:  Spoke with Camilla Allison  at bedside. Alfredo Mccoy was resting quietly in bed with eyes closed and did not participate in the conversation. Vivienne Larsen reports Alfredo Mccoy has dementia and Parkinson's and has fallen a lot. She was discharged form the ECF on Saturday and she fell Sunday morning at home. He is concerned he will not be able to care for her when she is ready for discharge. Informed him that case management will assist with his concerns. Discussed code status and explained full code vs limited code status. Vivienne Larsen stated Alfredo Mccoy doesn't want resuscitative efforts. Asked specifically about intubation and ventilation. Vivienne Larsen stated Yesica Jiang had that in ICU but I don't think she should do that again. We have talked about it and we both just want to go naturally\". Voiced understanding and he confirmed Antoinette's code status should be LIMITED x4 per her wishes. Vivienne Larsen also stated he has ACP documents at home and will bring in at next visit. Plan/Follow-Up:  Perfect serve message sent to Leonardo Cole

## 2023-03-29 LAB
ALBUMIN SERPL BCG-MCNC: 2.8 G/DL (ref 3.5–5.1)
ALP SERPL-CCNC: 49 U/L (ref 38–126)
ALT SERPL W/O P-5'-P-CCNC: < 5 U/L (ref 11–66)
ANION GAP SERPL CALC-SCNC: 8 MEQ/L (ref 8–16)
AST SERPL-CCNC: 22 U/L (ref 5–40)
BILIRUB SERPL-MCNC: 1 MG/DL (ref 0.3–1.2)
BUN SERPL-MCNC: 12 MG/DL (ref 7–22)
CA-I BLD ISE-SCNC: 1.13 MMOL/L (ref 1.12–1.32)
CALCIUM SERPL-MCNC: 8.2 MG/DL (ref 8.5–10.5)
CHLORIDE SERPL-SCNC: 109 MEQ/L (ref 98–111)
CO2 SERPL-SCNC: 25 MEQ/L (ref 23–33)
CREAT SERPL-MCNC: 0.6 MG/DL (ref 0.4–1.2)
DEPRECATED RDW RBC AUTO: 54.1 FL (ref 35–45)
ERYTHROCYTE [DISTWIDTH] IN BLOOD BY AUTOMATED COUNT: 16 % (ref 11.5–14.5)
GFR SERPL CREATININE-BSD FRML MDRD: > 60 ML/MIN/1.73M2
GLUCOSE SERPL-MCNC: 104 MG/DL (ref 70–108)
HCT VFR BLD AUTO: 28.8 % (ref 37–47)
HGB BLD-MCNC: 9.2 GM/DL (ref 12–16)
IRON SATN MFR SERPL: 18 % (ref 20–50)
IRON SERPL-MCNC: 27 UG/DL (ref 50–170)
MAGNESIUM SERPL-MCNC: 1.8 MG/DL (ref 1.6–2.4)
MCH RBC QN AUTO: 29.9 PG (ref 26–33)
MCHC RBC AUTO-ENTMCNC: 31.9 GM/DL (ref 32.2–35.5)
MCV RBC AUTO: 93.5 FL (ref 81–99)
PHOSPHATE SERPL-MCNC: 2.9 MG/DL (ref 2.4–4.7)
PLATELET # BLD AUTO: 90 THOU/MM3 (ref 130–400)
PMV BLD AUTO: 11.3 FL (ref 9.4–12.4)
POTASSIUM SERPL-SCNC: 3.6 MEQ/L (ref 3.5–5.2)
PROT SERPL-MCNC: 4.6 G/DL (ref 6.1–8)
RBC # BLD AUTO: 3.08 MILL/MM3 (ref 4.2–5.4)
SODIUM SERPL-SCNC: 142 MEQ/L (ref 135–145)
TIBC SERPL-MCNC: 154 UG/DL (ref 171–450)
WBC # BLD AUTO: 6.5 THOU/MM3 (ref 4.8–10.8)

## 2023-03-29 PROCEDURE — 6370000000 HC RX 637 (ALT 250 FOR IP)

## 2023-03-29 PROCEDURE — 97166 OT EVAL MOD COMPLEX 45 MIN: CPT

## 2023-03-29 PROCEDURE — 97129 THER IVNTJ 1ST 15 MIN: CPT

## 2023-03-29 PROCEDURE — 84100 ASSAY OF PHOSPHORUS: CPT

## 2023-03-29 PROCEDURE — 97535 SELF CARE MNGMENT TRAINING: CPT

## 2023-03-29 PROCEDURE — 83735 ASSAY OF MAGNESIUM: CPT

## 2023-03-29 PROCEDURE — 6370000000 HC RX 637 (ALT 250 FOR IP): Performed by: NURSE PRACTITIONER

## 2023-03-29 PROCEDURE — 97116 GAIT TRAINING THERAPY: CPT

## 2023-03-29 PROCEDURE — 83550 IRON BINDING TEST: CPT

## 2023-03-29 PROCEDURE — 1200000000 HC SEMI PRIVATE

## 2023-03-29 PROCEDURE — 83540 ASSAY OF IRON: CPT

## 2023-03-29 PROCEDURE — 80053 COMPREHEN METABOLIC PANEL: CPT

## 2023-03-29 PROCEDURE — 92526 ORAL FUNCTION THERAPY: CPT

## 2023-03-29 PROCEDURE — APPNB15 APP NON BILLABLE TIME 0-15 MINS: Performed by: PHYSICIAN ASSISTANT

## 2023-03-29 PROCEDURE — 97162 PT EVAL MOD COMPLEX 30 MIN: CPT

## 2023-03-29 PROCEDURE — 36415 COLL VENOUS BLD VENIPUNCTURE: CPT

## 2023-03-29 PROCEDURE — 99233 SBSQ HOSP IP/OBS HIGH 50: CPT | Performed by: PHYSICIAN ASSISTANT

## 2023-03-29 PROCEDURE — 82330 ASSAY OF CALCIUM: CPT

## 2023-03-29 PROCEDURE — 1200000003 HC TELEMETRY R&B

## 2023-03-29 PROCEDURE — 6360000002 HC RX W HCPCS: Performed by: PHYSICIAN ASSISTANT

## 2023-03-29 PROCEDURE — 85027 COMPLETE CBC AUTOMATED: CPT

## 2023-03-29 RX ORDER — HALOPERIDOL 5 MG/ML
5 INJECTION INTRAMUSCULAR ONCE
Status: COMPLETED | OUTPATIENT
Start: 2023-03-29 | End: 2023-03-29

## 2023-03-29 RX ADMIN — BACITRACIN: 500 OINTMENT TOPICAL at 08:40

## 2023-03-29 RX ADMIN — BACITRACIN: 500 OINTMENT TOPICAL at 15:35

## 2023-03-29 RX ADMIN — Medication 1 TABLET: at 08:39

## 2023-03-29 RX ADMIN — BACITRACIN ZINC NEOMYCIN SULFATE POLYMYXIN B SULFATE: 400; 3.5; 5 OINTMENT TOPICAL at 20:45

## 2023-03-29 RX ADMIN — ATORVASTATIN CALCIUM 40 MG: 40 TABLET, FILM COATED ORAL at 20:45

## 2023-03-29 RX ADMIN — BACITRACIN: 500 OINTMENT TOPICAL at 20:46

## 2023-03-29 RX ADMIN — CARBIDOPA AND LEVODOPA 1.5 TABLET: 25; 100 TABLET ORAL at 08:39

## 2023-03-29 RX ADMIN — CARBIDOPA AND LEVODOPA 1.5 TABLET: 25; 100 TABLET ORAL at 20:45

## 2023-03-29 RX ADMIN — HALOPERIDOL LACTATE 5 MG: 5 INJECTION INTRAMUSCULAR at 14:45

## 2023-03-29 RX ADMIN — ACETAMINOPHEN 500 MG: 500 TABLET ORAL at 20:45

## 2023-03-29 RX ADMIN — CARBIDOPA AND LEVODOPA 1.5 TABLET: 25; 100 TABLET ORAL at 15:35

## 2023-03-29 RX ADMIN — BACITRACIN ZINC NEOMYCIN SULFATE POLYMYXIN B SULFATE: 400; 3.5; 5 OINTMENT TOPICAL at 08:39

## 2023-03-29 RX ADMIN — ACETAMINOPHEN 500 MG: 500 TABLET ORAL at 08:39

## 2023-03-29 RX ADMIN — PANTOPRAZOLE SODIUM 40 MG: 40 TABLET, DELAYED RELEASE ORAL at 06:17

## 2023-03-29 ASSESSMENT — PAIN SCALES - GENERAL
PAINLEVEL_OUTOF10: 0

## 2023-03-30 LAB
ANION GAP SERPL CALC-SCNC: 8 MEQ/L (ref 8–16)
BUN SERPL-MCNC: 15 MG/DL (ref 7–22)
CALCIUM SERPL-MCNC: 8.3 MG/DL (ref 8.5–10.5)
CHLORIDE SERPL-SCNC: 109 MEQ/L (ref 98–111)
CO2 SERPL-SCNC: 26 MEQ/L (ref 23–33)
CREAT SERPL-MCNC: 0.5 MG/DL (ref 0.4–1.2)
DEPRECATED RDW RBC AUTO: 54.2 FL (ref 35–45)
ERYTHROCYTE [DISTWIDTH] IN BLOOD BY AUTOMATED COUNT: 15.8 % (ref 11.5–14.5)
FOLATE SERPL-MCNC: 11.5 NG/ML (ref 4.8–24.2)
GFR SERPL CREATININE-BSD FRML MDRD: > 60 ML/MIN/1.73M2
GLUCOSE SERPL-MCNC: 96 MG/DL (ref 70–108)
HCT VFR BLD AUTO: 26.9 % (ref 37–47)
HGB BLD-MCNC: 8.4 GM/DL (ref 12–16)
MCH RBC QN AUTO: 29.4 PG (ref 26–33)
MCHC RBC AUTO-ENTMCNC: 31.2 GM/DL (ref 32.2–35.5)
MCV RBC AUTO: 94.1 FL (ref 81–99)
PLATELET # BLD AUTO: 94 THOU/MM3 (ref 130–400)
PMV BLD AUTO: 11.1 FL (ref 9.4–12.4)
POTASSIUM SERPL-SCNC: 3.2 MEQ/L (ref 3.5–5.2)
RBC # BLD AUTO: 2.86 MILL/MM3 (ref 4.2–5.4)
SODIUM SERPL-SCNC: 143 MEQ/L (ref 135–145)
VIT B12 SERPL-MCNC: 339 PG/ML (ref 211–911)
WBC # BLD AUTO: 5.4 THOU/MM3 (ref 4.8–10.8)

## 2023-03-30 PROCEDURE — 1200000003 HC TELEMETRY R&B

## 2023-03-30 PROCEDURE — 6370000000 HC RX 637 (ALT 250 FOR IP): Performed by: PHYSICIAN ASSISTANT

## 2023-03-30 PROCEDURE — 97530 THERAPEUTIC ACTIVITIES: CPT

## 2023-03-30 PROCEDURE — 36415 COLL VENOUS BLD VENIPUNCTURE: CPT

## 2023-03-30 PROCEDURE — 85027 COMPLETE CBC AUTOMATED: CPT

## 2023-03-30 PROCEDURE — 97110 THERAPEUTIC EXERCISES: CPT

## 2023-03-30 PROCEDURE — 6370000000 HC RX 637 (ALT 250 FOR IP)

## 2023-03-30 PROCEDURE — 6370000000 HC RX 637 (ALT 250 FOR IP): Performed by: NURSE PRACTITIONER

## 2023-03-30 PROCEDURE — 80048 BASIC METABOLIC PNL TOTAL CA: CPT

## 2023-03-30 PROCEDURE — 1200000000 HC SEMI PRIVATE

## 2023-03-30 PROCEDURE — 97535 SELF CARE MNGMENT TRAINING: CPT

## 2023-03-30 PROCEDURE — 82746 ASSAY OF FOLIC ACID SERUM: CPT

## 2023-03-30 PROCEDURE — 97129 THER IVNTJ 1ST 15 MIN: CPT

## 2023-03-30 PROCEDURE — 2580000003 HC RX 258: Performed by: PHYSICIAN ASSISTANT

## 2023-03-30 PROCEDURE — 82607 VITAMIN B-12: CPT

## 2023-03-30 RX ORDER — FERROUS SULFATE 325(65) MG
325 TABLET ORAL 2 TIMES DAILY WITH MEALS
Status: DISCONTINUED | OUTPATIENT
Start: 2023-03-30 | End: 2023-04-13 | Stop reason: HOSPADM

## 2023-03-30 RX ORDER — 0.9 % SODIUM CHLORIDE 0.9 %
250 INTRAVENOUS SOLUTION INTRAVENOUS ONCE
Status: COMPLETED | OUTPATIENT
Start: 2023-03-30 | End: 2023-03-30

## 2023-03-30 RX ORDER — TRAZODONE HYDROCHLORIDE 50 MG/1
50 TABLET ORAL NIGHTLY PRN
Status: DISCONTINUED | OUTPATIENT
Start: 2023-03-30 | End: 2023-04-01

## 2023-03-30 RX ADMIN — BACITRACIN: 500 OINTMENT TOPICAL at 22:28

## 2023-03-30 RX ADMIN — BACITRACIN: 500 OINTMENT TOPICAL at 09:26

## 2023-03-30 RX ADMIN — Medication 1 TABLET: at 09:18

## 2023-03-30 RX ADMIN — FERROUS SULFATE TAB 325 MG (65 MG ELEMENTAL FE) 325 MG: 325 (65 FE) TAB at 11:02

## 2023-03-30 RX ADMIN — FERROUS SULFATE TAB 325 MG (65 MG ELEMENTAL FE) 325 MG: 325 (65 FE) TAB at 18:07

## 2023-03-30 RX ADMIN — CARBIDOPA AND LEVODOPA 1.5 TABLET: 25; 100 TABLET ORAL at 22:27

## 2023-03-30 RX ADMIN — ACETAMINOPHEN 500 MG: 500 TABLET ORAL at 09:19

## 2023-03-30 RX ADMIN — MIDODRINE HYDROCHLORIDE 10 MG: 10 TABLET ORAL at 09:18

## 2023-03-30 RX ADMIN — SODIUM CHLORIDE 250 ML: 9 INJECTION, SOLUTION INTRAVENOUS at 11:09

## 2023-03-30 RX ADMIN — PANTOPRAZOLE SODIUM 40 MG: 40 TABLET, DELAYED RELEASE ORAL at 06:26

## 2023-03-30 RX ADMIN — BACITRACIN ZINC NEOMYCIN SULFATE POLYMYXIN B SULFATE: 400; 3.5; 5 OINTMENT TOPICAL at 09:27

## 2023-03-30 RX ADMIN — BACITRACIN ZINC NEOMYCIN SULFATE POLYMYXIN B SULFATE: 400; 3.5; 5 OINTMENT TOPICAL at 22:27

## 2023-03-30 RX ADMIN — CARBIDOPA AND LEVODOPA 1.5 TABLET: 25; 100 TABLET ORAL at 09:18

## 2023-03-30 RX ADMIN — POTASSIUM CHLORIDE 40 MEQ: 1500 TABLET, EXTENDED RELEASE ORAL at 09:19

## 2023-03-30 RX ADMIN — CARBIDOPA AND LEVODOPA 1.5 TABLET: 25; 100 TABLET ORAL at 15:49

## 2023-03-30 RX ADMIN — BACITRACIN: 500 OINTMENT TOPICAL at 15:52

## 2023-03-30 RX ADMIN — ACETAMINOPHEN 500 MG: 500 TABLET ORAL at 22:26

## 2023-03-30 RX ADMIN — ATORVASTATIN CALCIUM 40 MG: 40 TABLET, FILM COATED ORAL at 22:27

## 2023-03-30 ASSESSMENT — PAIN SCALES - GENERAL
PAINLEVEL_OUTOF10: 0

## 2023-03-31 LAB
ANION GAP SERPL CALC-SCNC: 7 MEQ/L (ref 8–16)
BACTERIA: ABNORMAL
BILIRUB UR QL STRIP: NEGATIVE
BUN SERPL-MCNC: 12 MG/DL (ref 7–22)
CALCIUM SERPL-MCNC: 8.5 MG/DL (ref 8.5–10.5)
CASTS #/AREA URNS LPF: ABNORMAL /LPF
CASTS #/AREA URNS LPF: ABNORMAL /LPF
CHARACTER UR: ABNORMAL
CHARCOAL URNS QL MICRO: ABNORMAL
CHLORIDE SERPL-SCNC: 109 MEQ/L (ref 98–111)
CO2 SERPL-SCNC: 27 MEQ/L (ref 23–33)
COLOR UR: YELLOW
CREAT SERPL-MCNC: 0.6 MG/DL (ref 0.4–1.2)
CRYSTALS URNS QL MICRO: ABNORMAL
DEPRECATED RDW RBC AUTO: 53.9 FL (ref 35–45)
EPITHELIAL CELLS, UA: ABNORMAL /HPF
ERYTHROCYTE [DISTWIDTH] IN BLOOD BY AUTOMATED COUNT: 15.8 % (ref 11.5–14.5)
GFR SERPL CREATININE-BSD FRML MDRD: > 60 ML/MIN/1.73M2
GLUCOSE SERPL-MCNC: 99 MG/DL (ref 70–108)
GLUCOSE UR QL STRIP.AUTO: NEGATIVE MG/DL
HCT VFR BLD AUTO: 28.1 % (ref 37–47)
HGB BLD-MCNC: 8.8 GM/DL (ref 12–16)
HGB UR QL STRIP.AUTO: ABNORMAL
KETONES UR QL STRIP.AUTO: NEGATIVE
LEUKOCYTE ESTERASE UR QL STRIP.AUTO: ABNORMAL
MCH RBC QN AUTO: 29.6 PG (ref 26–33)
MCHC RBC AUTO-ENTMCNC: 31.3 GM/DL (ref 32.2–35.5)
MCV RBC AUTO: 94.6 FL (ref 81–99)
NITRITE UR QL STRIP.AUTO: POSITIVE
PH UR STRIP.AUTO: 6 [PH] (ref 5–9)
PLATELET # BLD AUTO: 117 THOU/MM3 (ref 130–400)
PMV BLD AUTO: 10.7 FL (ref 9.4–12.4)
POTASSIUM SERPL-SCNC: 3.8 MEQ/L (ref 3.5–5.2)
PROT UR STRIP.AUTO-MCNC: NEGATIVE MG/DL
RBC # BLD AUTO: 2.97 MILL/MM3 (ref 4.2–5.4)
RBC #/AREA URNS HPF: ABNORMAL /HPF
RENAL EPI CELLS #/AREA URNS HPF: ABNORMAL /[HPF]
SODIUM SERPL-SCNC: 143 MEQ/L (ref 135–145)
SPECIFIC GRAVITY UA: 1.01 (ref 1–1.03)
UROBILINOGEN, URINE: 1 EU/DL (ref 0–1)
WBC # BLD AUTO: 5.3 THOU/MM3 (ref 4.8–10.8)
WBC #/AREA URNS HPF: > 200 /HPF
YEAST LIKE FUNGI URNS QL MICRO: ABNORMAL

## 2023-03-31 PROCEDURE — 87186 SC STD MICRODIL/AGAR DIL: CPT

## 2023-03-31 PROCEDURE — 99233 SBSQ HOSP IP/OBS HIGH 50: CPT | Performed by: PHYSICIAN ASSISTANT

## 2023-03-31 PROCEDURE — 6370000000 HC RX 637 (ALT 250 FOR IP): Performed by: PHYSICIAN ASSISTANT

## 2023-03-31 PROCEDURE — 97530 THERAPEUTIC ACTIVITIES: CPT

## 2023-03-31 PROCEDURE — 97535 SELF CARE MNGMENT TRAINING: CPT

## 2023-03-31 PROCEDURE — 6370000000 HC RX 637 (ALT 250 FOR IP): Performed by: NURSE PRACTITIONER

## 2023-03-31 PROCEDURE — 6370000000 HC RX 637 (ALT 250 FOR IP)

## 2023-03-31 PROCEDURE — 6360000002 HC RX W HCPCS: Performed by: PHYSICIAN ASSISTANT

## 2023-03-31 PROCEDURE — 36415 COLL VENOUS BLD VENIPUNCTURE: CPT

## 2023-03-31 PROCEDURE — 81001 URINALYSIS AUTO W/SCOPE: CPT

## 2023-03-31 PROCEDURE — 97129 THER IVNTJ 1ST 15 MIN: CPT

## 2023-03-31 PROCEDURE — 80048 BASIC METABOLIC PNL TOTAL CA: CPT

## 2023-03-31 PROCEDURE — 87077 CULTURE AEROBIC IDENTIFY: CPT

## 2023-03-31 PROCEDURE — 87086 URINE CULTURE/COLONY COUNT: CPT

## 2023-03-31 PROCEDURE — 97116 GAIT TRAINING THERAPY: CPT

## 2023-03-31 PROCEDURE — 2580000003 HC RX 258: Performed by: PHYSICIAN ASSISTANT

## 2023-03-31 PROCEDURE — 1200000000 HC SEMI PRIVATE

## 2023-03-31 PROCEDURE — 1200000003 HC TELEMETRY R&B

## 2023-03-31 PROCEDURE — 85027 COMPLETE CBC AUTOMATED: CPT

## 2023-03-31 RX ORDER — ASPIRIN 81 MG/1
81 TABLET ORAL DAILY
Status: DISCONTINUED | OUTPATIENT
Start: 2023-03-31 | End: 2023-04-13 | Stop reason: HOSPADM

## 2023-03-31 RX ORDER — CLOPIDOGREL BISULFATE 75 MG/1
75 TABLET ORAL DAILY
Status: DISCONTINUED | OUTPATIENT
Start: 2023-03-31 | End: 2023-04-13 | Stop reason: HOSPADM

## 2023-03-31 RX ADMIN — ACETAMINOPHEN 500 MG: 500 TABLET ORAL at 20:54

## 2023-03-31 RX ADMIN — CARBIDOPA AND LEVODOPA 1.5 TABLET: 25; 100 TABLET ORAL at 09:37

## 2023-03-31 RX ADMIN — FERROUS SULFATE TAB 325 MG (65 MG ELEMENTAL FE) 325 MG: 325 (65 FE) TAB at 09:37

## 2023-03-31 RX ADMIN — ATORVASTATIN CALCIUM 40 MG: 40 TABLET, FILM COATED ORAL at 20:54

## 2023-03-31 RX ADMIN — ACETAMINOPHEN 500 MG: 500 TABLET ORAL at 09:39

## 2023-03-31 RX ADMIN — MIDODRINE HYDROCHLORIDE 10 MG: 10 TABLET ORAL at 14:02

## 2023-03-31 RX ADMIN — MIDODRINE HYDROCHLORIDE 10 MG: 10 TABLET ORAL at 09:37

## 2023-03-31 RX ADMIN — CARBIDOPA AND LEVODOPA 1.5 TABLET: 25; 100 TABLET ORAL at 20:53

## 2023-03-31 RX ADMIN — BACITRACIN ZINC NEOMYCIN SULFATE POLYMYXIN B SULFATE: 400; 3.5; 5 OINTMENT TOPICAL at 20:54

## 2023-03-31 RX ADMIN — CLOPIDOGREL BISULFATE 75 MG: 75 TABLET ORAL at 16:44

## 2023-03-31 RX ADMIN — BACITRACIN ZINC NEOMYCIN SULFATE POLYMYXIN B SULFATE: 400; 3.5; 5 OINTMENT TOPICAL at 09:36

## 2023-03-31 RX ADMIN — Medication 1 TABLET: at 09:37

## 2023-03-31 RX ADMIN — CARBIDOPA AND LEVODOPA 1.5 TABLET: 25; 100 TABLET ORAL at 14:01

## 2023-03-31 RX ADMIN — BACITRACIN: 500 OINTMENT TOPICAL at 14:00

## 2023-03-31 RX ADMIN — BACITRACIN: 500 OINTMENT TOPICAL at 20:55

## 2023-03-31 RX ADMIN — BACITRACIN: 500 OINTMENT TOPICAL at 09:37

## 2023-03-31 RX ADMIN — PANTOPRAZOLE SODIUM 40 MG: 40 TABLET, DELAYED RELEASE ORAL at 05:33

## 2023-03-31 RX ADMIN — FERROUS SULFATE TAB 325 MG (65 MG ELEMENTAL FE) 325 MG: 325 (65 FE) TAB at 16:44

## 2023-03-31 RX ADMIN — CEFTRIAXONE SODIUM 1000 MG: 1 INJECTION, POWDER, FOR SOLUTION INTRAMUSCULAR; INTRAVENOUS at 11:12

## 2023-03-31 RX ADMIN — ASPIRIN 81 MG: 81 TABLET, COATED ORAL at 16:44

## 2023-03-31 ASSESSMENT — PAIN DESCRIPTION - LOCATION: LOCATION: HEAD

## 2023-03-31 ASSESSMENT — PAIN DESCRIPTION - DESCRIPTORS: DESCRIPTORS: ACHING

## 2023-03-31 ASSESSMENT — PAIN SCALES - GENERAL: PAINLEVEL_OUTOF10: 4

## 2023-03-31 NOTE — CARE COORDINATION
03/28/23 8:02 AM    Pt transferred to 7K06. Handoff report given to Maxine Cruz CM.
3/28/23, 8:17 AM EDT    DISCHARGE ON GOING EVALUATION    [facility] Pride day: 2  Location: -06/006-A Reason for admit: Other specified hypotension [I95.89]  Closed head injury, initial encounter [S09.90XA]  Fall, initial encounter [W19. XXXA]  Laceration of scalp, initial encounter [S01.01XA]  Traumatic head injury with multiple lacerations, initial encounter [S09.90XA, S01.91XA]   Procedure: 3/26 Control of active scalp hemorrhage. Closure of degloving scalping flap wound of the scalp, 7 x 7 cm. By Dr Rutledge   Barriers to Discharge: K+ 3.2 with replacement, Ca 7.7, Hgb 8.1, albumin 2.7. Dietitian consulted, SLP, PT/OT to see. On room air, palliative care to see. Monitor scalp sutures. Patient Goals/Plan/Treatment Preferences: tx from ICU to [facility]; await therapy recommendations. From home with spouse; has CPAP, shower chair, RW, and cane. Need to confirm with . 1150 am- called  Daksha Degroot. He shared his wife just got released from [address] on Sat 3/25/23. She fell Harry  3/26 and was admitted here. She has RW, W/I shower with a seat, CPAP, as well as a cane.  will bring CPAP machine in to the hospital around 1500 today. Informed we will follow therapy recs for discharge plan. SW consulted. Noted to have a T12 compression fracture which she was admitted to an outside facility for from 3/8-3/10/23.
3/29/23, 3:04 PM EDT    DISCHARGE ON GOING [address] day: 3  Location: -06/006-A Reason for admit: Other specified hypotension [I95.89]  Closed head injury, initial encounter [S09.90XA]  Fall, initial encounter [W19. XXXA]  Laceration of scalp, initial encounter [S01.01XA]  Traumatic head injury with multiple lacerations, initial encounter [S09.90XA, S01.91XA]   Procedure: 3/26 Control of active scalp hemorrhage; Closure of degloving scalping flap wound of the scalp, 7 x 7 cm  3/26 CXR: No pulmonary infiltrates. No pneumothorax  3/27 Extubated  Barriers to Discharge: PT/OT/ST  following, telesitter bedside-RN thought may be able to take out today, pain control,   PCP: Myrtle Mckay MD  Readmission Risk Score: 14.2%  Patient Goals/Plan/Treatment Preferences: From home with spouse. SW following. Referral made to [facility] [place].
3/30/23, 2:29 PM EDT    DISCHARGE PLANNING EVALUATION    Spoke with Radha, facility will consider once sitter is no longer required.   Will need precert, if accepting
3/30/23, 2:37 PM EDT    DISCHARGE ON GOING [address]  day: 4  Location: -06/006-A Reason for admit: Other specified hypotension [I95.89]  Closed head injury, initial encounter [S09.90XA]  Fall, initial encounter [W19. XXXA]  Laceration of scalp, initial encounter [S01.01XA]  Traumatic head injury with multiple lacerations, initial encounter [S09.90XA, S01.91XA]   Procedure:   3/26 Control of active scalp hemorrhage; Closure of degloving scalping flap wound of the scalp, 7 x 7 cm  3/26 CXR: No pulmonary infiltrates. No pneumothorax  3/27 Extubated  Barriers to Discharge: SLP follows, PT/OT, ONS added, sitter at bedside due to confusion. K+ 3.2 with replacement. Hgb 8.4. Precert for ecf. PCP: Clara Funk MD  Readmission Risk Score: 14.8%  Patient Goals/Plan/Treatment Preferences: From home with spouse. SW following. Referral made to [facility] [place].
3/31/23, 12:13 PM EDT    DISCHARGE ON GOING [address] [facility] day: 5  Location: 7K-06/006-A Reason for admit: Other specified hypotension [I95.89]  Closed head injury, initial encounter [S09.90XA]  Fall, initial encounter [W19. XXXA]  Laceration of scalp, initial encounter [S01.01XA]  Traumatic head injury with multiple lacerations, initial encounter [S09.90XA, S01.91XA]   Procedure:   3/26 Control of active scalp hemorrhage; Closure of degloving scalping flap wound of the scalp, 7 x 7 cm  3/26 CXR: No pulmonary infiltrates. No pneumothorax  3/27 Extubated tx to 7K06  Barriers to Discharge: sitter remains in room, continue therapies, needs PRECERT  PCP: Prudence Murillo MD  Readmission Risk Score: 14.8%  Patient Goals/Plan/Treatment Preferences: SW assisting with ECF placement; see Deep Lawrenceknolberto note from 3/30. Pt will be precert.
3/31/23, 8:48 AM EDT    DISCHARGE PLANNING EVALUATION    [facility] considering for possible admission,  depending on mental status and behaviors. Will need precert if accepting.
DISCHARGE PLANNING EVALUATION  3/29/23, 1:49 PM EDT    Reason for Referral: [facility]  Mental Status: alert, confused   Decision Making:  assists with decisions about  care  Family/Social/Home Environment:  Corrinne Augusta lives at home with her . She had been home from the [facility] for only a few days before falling at home and admitting to the hospital.  Family is considering return to [facility] for further rehab.  is limited in care he can provide at home   Current Services including food security, transportation and housekeeping: no current services  Current Equipment: walker   Payment Source: [employer] medicare   Concerns or Barriers to Discharge: will need precert for return to [facility]   Post-acute [facility]) provider list was provided to patient. Patient was informed of their freedom to choose [facility] provider. Discussed and offered to show the patient the relevant [facility] Providers quality and resource use measures on Medicare Compare web site via computer based on patient's goals of care and treatment preferences. Questions regarding selection process were answered. Teach Back Method used with patient's  regarding care plan and discharge plan  Patient's  verbalizes understanding of the plan of care and contributes to goal setting. Patient goals, treatment preferences and discharge plan:  spoke with  about discharge plan. He has spoken with Corrinne Augusta and they would like for her to return to [facility] if possible. Referral made to [facility], will need precert if accepting.     Electronically signed by LOIS Encarnacion on 3/29/2023 at 1:49 PM
any other family members/significant others, and if so, who? Yes ()  Plans to Return to Present Housing: Unknown at present  Other Identified Issues/Barriers to RETURNING to current housing: [place]; therapy to eval  Potential Assistance needed at discharge: Home Care            Potential DME:    Patient expects to discharge to: [address] [place] [address] for transportation at discharge: Family    Financial    Payor: Harjeet Frias / Plan: Faby Mark PPO / Product Type: Medicare /     Does insurance require precert for SNF: Yes    Potential assistance Purchasing Medications: No  Meds-to-Beds request: Yes      [facility] - [address] [place], [place] - [address] [facility] [address]  [address]  Phone: [phone] Fax: [facility] #[id number] - [address] [place], [place] - [address] [facility] [phone] - F [phone]  [address]  [address]  Phone: [phone] Fax: [phone]      Notes:    Factors facilitating achievement of predicted outcomes: Family support, Cooperative, and Pleasant    Barriers to discharge: Medical complications and Medication managment    Additional Case Management Notes: Presented to ED after an unwitnessed fall at home, possibly after a syncopal episode. Found to have a scalp flap. BP 88/41. Given 2L fld bolus. Was intubated in ED and emergently taken to surgery for closure of head flap and control of bleeding. Admitted to ICU post-op on vent. Intensivist consulted. Received 2 PRBC. Extubated this morning. Weaned off levophed drip this morning. Noted to have a T12 compression fracture which she was admitted to an outside facility for from 3/8-3/10/23. Tmax 99.5. 's. On room air. Oriented to self. SLP/PT/OT. Palliative Care consulted. Telemetry, neuro checks, wound care, KRUNAL chases. Neosporin, IV protonix. Received IV albumin x1 this morning. Received 2g mag sulfate x1 today.  Trop neg, alb 3.1 - now 2.7, bili 0.7 - now

## 2023-04-01 LAB
BACTERIA BLD AEROBE CULT: NORMAL
BACTERIA BLD AEROBE CULT: NORMAL
DEPRECATED RDW RBC AUTO: 55.2 FL (ref 35–45)
ERYTHROCYTE [DISTWIDTH] IN BLOOD BY AUTOMATED COUNT: 16.2 % (ref 11.5–14.5)
HCT VFR BLD AUTO: 31.9 % (ref 37–47)
HGB BLD-MCNC: 10.1 GM/DL (ref 12–16)
MCH RBC QN AUTO: 30.2 PG (ref 26–33)
MCHC RBC AUTO-ENTMCNC: 31.7 GM/DL (ref 32.2–35.5)
MCV RBC AUTO: 95.5 FL (ref 81–99)
PLATELET # BLD AUTO: 155 THOU/MM3 (ref 130–400)
PMV BLD AUTO: 10.8 FL (ref 9.4–12.4)
RBC # BLD AUTO: 3.34 MILL/MM3 (ref 4.2–5.4)
WBC # BLD AUTO: 6.7 THOU/MM3 (ref 4.8–10.8)

## 2023-04-01 PROCEDURE — 6370000000 HC RX 637 (ALT 250 FOR IP): Performed by: NURSE PRACTITIONER

## 2023-04-01 PROCEDURE — 97116 GAIT TRAINING THERAPY: CPT

## 2023-04-01 PROCEDURE — 6370000000 HC RX 637 (ALT 250 FOR IP): Performed by: PHYSICIAN ASSISTANT

## 2023-04-01 PROCEDURE — 99233 SBSQ HOSP IP/OBS HIGH 50: CPT | Performed by: PHYSICIAN ASSISTANT

## 2023-04-01 PROCEDURE — 2580000003 HC RX 258: Performed by: PHYSICIAN ASSISTANT

## 2023-04-01 PROCEDURE — 1200000003 HC TELEMETRY R&B

## 2023-04-01 PROCEDURE — 36415 COLL VENOUS BLD VENIPUNCTURE: CPT

## 2023-04-01 PROCEDURE — 1200000000 HC SEMI PRIVATE

## 2023-04-01 PROCEDURE — 6370000000 HC RX 637 (ALT 250 FOR IP)

## 2023-04-01 PROCEDURE — 6360000002 HC RX W HCPCS: Performed by: PHYSICIAN ASSISTANT

## 2023-04-01 PROCEDURE — 85027 COMPLETE CBC AUTOMATED: CPT

## 2023-04-01 PROCEDURE — 97110 THERAPEUTIC EXERCISES: CPT

## 2023-04-01 RX ORDER — TRAZODONE HYDROCHLORIDE 50 MG/1
50 TABLET ORAL NIGHTLY
Status: DISCONTINUED | OUTPATIENT
Start: 2023-04-01 | End: 2023-04-13 | Stop reason: HOSPADM

## 2023-04-01 RX ORDER — HYDROXYZINE HYDROCHLORIDE 10 MG/1
10 TABLET, FILM COATED ORAL 3 TIMES DAILY PRN
Status: DISCONTINUED | OUTPATIENT
Start: 2023-04-01 | End: 2023-04-13 | Stop reason: HOSPADM

## 2023-04-01 RX ADMIN — CARBIDOPA AND LEVODOPA 1.5 TABLET: 25; 100 TABLET ORAL at 14:12

## 2023-04-01 RX ADMIN — BACITRACIN: 500 OINTMENT TOPICAL at 20:42

## 2023-04-01 RX ADMIN — ASPIRIN 81 MG: 81 TABLET, COATED ORAL at 09:36

## 2023-04-01 RX ADMIN — MIDODRINE HYDROCHLORIDE 10 MG: 10 TABLET ORAL at 09:38

## 2023-04-01 RX ADMIN — FERROUS SULFATE TAB 325 MG (65 MG ELEMENTAL FE) 325 MG: 325 (65 FE) TAB at 16:28

## 2023-04-01 RX ADMIN — MIDODRINE HYDROCHLORIDE 10 MG: 10 TABLET ORAL at 14:12

## 2023-04-01 RX ADMIN — TRAZODONE HYDROCHLORIDE 50 MG: 50 TABLET ORAL at 20:42

## 2023-04-01 RX ADMIN — BACITRACIN ZINC NEOMYCIN SULFATE POLYMYXIN B SULFATE: 400; 3.5; 5 OINTMENT TOPICAL at 09:38

## 2023-04-01 RX ADMIN — CEFTRIAXONE SODIUM 1000 MG: 1 INJECTION, POWDER, FOR SOLUTION INTRAMUSCULAR; INTRAVENOUS at 10:39

## 2023-04-01 RX ADMIN — MIDODRINE HYDROCHLORIDE 10 MG: 10 TABLET ORAL at 20:42

## 2023-04-01 RX ADMIN — Medication 1 TABLET: at 09:38

## 2023-04-01 RX ADMIN — ACETAMINOPHEN 500 MG: 500 TABLET ORAL at 09:36

## 2023-04-01 RX ADMIN — ATORVASTATIN CALCIUM 40 MG: 40 TABLET, FILM COATED ORAL at 20:41

## 2023-04-01 RX ADMIN — BACITRACIN: 500 OINTMENT TOPICAL at 09:39

## 2023-04-01 RX ADMIN — HYDROXYZINE HYDROCHLORIDE 10 MG: 10 TABLET ORAL at 13:04

## 2023-04-01 RX ADMIN — PANTOPRAZOLE SODIUM 40 MG: 40 TABLET, DELAYED RELEASE ORAL at 05:37

## 2023-04-01 RX ADMIN — ACETAMINOPHEN 500 MG: 500 TABLET ORAL at 20:48

## 2023-04-01 RX ADMIN — FERROUS SULFATE TAB 325 MG (65 MG ELEMENTAL FE) 325 MG: 325 (65 FE) TAB at 09:38

## 2023-04-01 RX ADMIN — BACITRACIN: 500 OINTMENT TOPICAL at 14:12

## 2023-04-01 RX ADMIN — BACITRACIN ZINC NEOMYCIN SULFATE POLYMYXIN B SULFATE: 400; 3.5; 5 OINTMENT TOPICAL at 20:43

## 2023-04-01 RX ADMIN — CARBIDOPA AND LEVODOPA 1.5 TABLET: 25; 100 TABLET ORAL at 20:41

## 2023-04-01 RX ADMIN — CLOPIDOGREL BISULFATE 75 MG: 75 TABLET ORAL at 09:37

## 2023-04-01 RX ADMIN — CARBIDOPA AND LEVODOPA 1.5 TABLET: 25; 100 TABLET ORAL at 09:37

## 2023-04-01 ASSESSMENT — PAIN SCALES - GENERAL
PAINLEVEL_OUTOF10: 0

## 2023-04-02 VITALS
RESPIRATION RATE: 19 BRPM | OXYGEN SATURATION: 100 % | DIASTOLIC BLOOD PRESSURE: 84 MMHG | SYSTOLIC BLOOD PRESSURE: 169 MMHG | TEMPERATURE: 97.6 F | HEIGHT: 66 IN | BODY MASS INDEX: 24.48 KG/M2 | HEART RATE: 72 BPM | WEIGHT: 152.34 LBS

## 2023-04-02 LAB
ANION GAP SERPL CALC-SCNC: 10 MEQ/L (ref 8–16)
BACTERIA UR CULT: ABNORMAL
BUN SERPL-MCNC: 17 MG/DL (ref 7–22)
CALCIUM SERPL-MCNC: 8.4 MG/DL (ref 8.5–10.5)
CHLORIDE SERPL-SCNC: 104 MEQ/L (ref 98–111)
CO2 SERPL-SCNC: 27 MEQ/L (ref 23–33)
CREAT SERPL-MCNC: 0.6 MG/DL (ref 0.4–1.2)
GFR SERPL CREATININE-BSD FRML MDRD: > 60 ML/MIN/1.73M2
GLUCOSE SERPL-MCNC: 163 MG/DL (ref 70–108)
ORGANISM: ABNORMAL
POTASSIUM SERPL-SCNC: 3.2 MEQ/L (ref 3.5–5.2)
SODIUM SERPL-SCNC: 141 MEQ/L (ref 135–145)

## 2023-04-02 PROCEDURE — 6370000000 HC RX 637 (ALT 250 FOR IP): Performed by: PHYSICIAN ASSISTANT

## 2023-04-02 PROCEDURE — 1200000000 HC SEMI PRIVATE

## 2023-04-02 PROCEDURE — 1200000003 HC TELEMETRY R&B

## 2023-04-02 PROCEDURE — 36415 COLL VENOUS BLD VENIPUNCTURE: CPT

## 2023-04-02 PROCEDURE — 6370000000 HC RX 637 (ALT 250 FOR IP): Performed by: NURSE PRACTITIONER

## 2023-04-02 PROCEDURE — 99233 SBSQ HOSP IP/OBS HIGH 50: CPT | Performed by: PHYSICIAN ASSISTANT

## 2023-04-02 PROCEDURE — 2580000003 HC RX 258: Performed by: PHYSICIAN ASSISTANT

## 2023-04-02 PROCEDURE — 80048 BASIC METABOLIC PNL TOTAL CA: CPT

## 2023-04-02 PROCEDURE — 6370000000 HC RX 637 (ALT 250 FOR IP)

## 2023-04-02 PROCEDURE — 6360000002 HC RX W HCPCS: Performed by: PHYSICIAN ASSISTANT

## 2023-04-02 RX ORDER — PREDNISOLONE SODIUM PHOSPHATE 10 MG/ML
1 SOLUTION/ DROPS OPHTHALMIC 4 TIMES DAILY
Status: DISCONTINUED | OUTPATIENT
Start: 2023-04-02 | End: 2023-04-02 | Stop reason: ALTCHOICE

## 2023-04-02 RX ORDER — CEFDINIR 300 MG/1
300 CAPSULE ORAL EVERY 12 HOURS SCHEDULED
Status: COMPLETED | OUTPATIENT
Start: 2023-04-03 | End: 2023-04-04

## 2023-04-02 RX ORDER — PREDNISOLONE ACETATE 10 MG/ML
1 SUSPENSION/ DROPS OPHTHALMIC 4 TIMES DAILY
Status: DISCONTINUED | OUTPATIENT
Start: 2023-04-02 | End: 2023-04-13 | Stop reason: HOSPADM

## 2023-04-02 RX ORDER — POLYVINYL ALCOHOL 14 MG/ML
1 SOLUTION/ DROPS OPHTHALMIC EVERY 4 HOURS PRN
Status: DISCONTINUED | OUTPATIENT
Start: 2023-04-02 | End: 2023-04-13 | Stop reason: HOSPADM

## 2023-04-02 RX ADMIN — FERROUS SULFATE TAB 325 MG (65 MG ELEMENTAL FE) 325 MG: 325 (65 FE) TAB at 09:02

## 2023-04-02 RX ADMIN — Medication 1 TABLET: at 09:01

## 2023-04-02 RX ADMIN — FERROUS SULFATE TAB 325 MG (65 MG ELEMENTAL FE) 325 MG: 325 (65 FE) TAB at 17:03

## 2023-04-02 RX ADMIN — CARBIDOPA AND LEVODOPA 1.5 TABLET: 25; 100 TABLET ORAL at 20:00

## 2023-04-02 RX ADMIN — CLOPIDOGREL BISULFATE 75 MG: 75 TABLET ORAL at 09:05

## 2023-04-02 RX ADMIN — MIDODRINE HYDROCHLORIDE 10 MG: 10 TABLET ORAL at 14:14

## 2023-04-02 RX ADMIN — ACETAMINOPHEN 500 MG: 500 TABLET ORAL at 19:59

## 2023-04-02 RX ADMIN — CEFTRIAXONE SODIUM 1000 MG: 1 INJECTION, POWDER, FOR SOLUTION INTRAMUSCULAR; INTRAVENOUS at 09:49

## 2023-04-02 RX ADMIN — BACITRACIN: 500 OINTMENT TOPICAL at 09:03

## 2023-04-02 RX ADMIN — HYDROXYZINE HYDROCHLORIDE 10 MG: 10 TABLET ORAL at 20:00

## 2023-04-02 RX ADMIN — PANTOPRAZOLE SODIUM 40 MG: 40 TABLET, DELAYED RELEASE ORAL at 06:11

## 2023-04-02 RX ADMIN — CARBIDOPA AND LEVODOPA 1.5 TABLET: 25; 100 TABLET ORAL at 09:01

## 2023-04-02 RX ADMIN — ATORVASTATIN CALCIUM 40 MG: 40 TABLET, FILM COATED ORAL at 19:59

## 2023-04-02 RX ADMIN — TRAZODONE HYDROCHLORIDE 50 MG: 50 TABLET ORAL at 20:00

## 2023-04-02 RX ADMIN — PREDNISOLONE ACETATE 1 DROP: 10 SUSPENSION/ DROPS OPHTHALMIC at 20:03

## 2023-04-02 RX ADMIN — BACITRACIN: 500 OINTMENT TOPICAL at 14:10

## 2023-04-02 RX ADMIN — ASPIRIN 81 MG: 81 TABLET, COATED ORAL at 09:05

## 2023-04-02 RX ADMIN — BACITRACIN: 500 OINTMENT TOPICAL at 20:02

## 2023-04-02 RX ADMIN — PREDNISOLONE ACETATE 1 DROP: 10 SUSPENSION/ DROPS OPHTHALMIC at 14:10

## 2023-04-02 RX ADMIN — ACETAMINOPHEN 500 MG: 500 TABLET ORAL at 09:06

## 2023-04-02 RX ADMIN — BACITRACIN ZINC NEOMYCIN SULFATE POLYMYXIN B SULFATE: 400; 3.5; 5 OINTMENT TOPICAL at 20:01

## 2023-04-02 RX ADMIN — CARBIDOPA AND LEVODOPA 1.5 TABLET: 25; 100 TABLET ORAL at 14:11

## 2023-04-02 RX ADMIN — POLYVINYL ALCOHOL 1 DROP: 14 SOLUTION/ DROPS OPHTHALMIC at 20:00

## 2023-04-02 RX ADMIN — BACITRACIN ZINC NEOMYCIN SULFATE POLYMYXIN B SULFATE: 400; 3.5; 5 OINTMENT TOPICAL at 09:03

## 2023-04-02 RX ADMIN — POLYVINYL ALCOHOL 1 DROP: 14 SOLUTION/ DROPS OPHTHALMIC at 20:02

## 2023-04-02 ASSESSMENT — PAIN SCALES - GENERAL: PAINLEVEL_OUTOF10: 0

## 2023-04-02 NOTE — PROGRESS NOTES
03/27/23 0825   Encounter Summary   Encounter Overview/Reason  Spiritual/Emotional Needs   Service Provided For: Patient and family together   Referral/Consult From: [address] Family members   Last Encounter  03/27/23  (N/R)   Complexity of Encounter Moderate   Begin Time 0815   End Time  0825   Total Time Calculated 10 min   Encounter    Type Follow up   Spiritual/Emotional needs   Type Spiritual Support   Assessment/Intervention/Outcome   Assessment Coping   Intervention Sustaining Presence/Ministry of presence;Prayer (assurance of)/Michael;Nurtured Hope   Outcome Coping;Encouraged   Assessment: In my encounter with the 79 yr old patient in (they were non-responsive) so I had conversation with the patient's family. I also came to assess the present spiritual needs of the patient and the family. The pt was admitted due to traumatic head injury with multiple lacerations. Interventions:  I provided, prayer, emotional support and words of comfort.  provided a listening presence and encouraged pt to share their beliefs and how they support them during their hospitalization. Outcomes: The patient's family was grateful and didn't share any further spiritual needs at this time. The pt's family shared that they were appreciative for the support. Plan:  Chaplains will follow-up at a later time for assessment of any spiritual care needs present.   The Chaplains will be available to provide further emotional support per request.
1030  Patient follows simple commands. Eyes open. Extubated per respiratory therapist.  On room air. Lungs clear bilaterally. No distress noted. Oral care given.  updated in room. 1600  Patient able to swallow water without difficulty. Patient oriented to name. Disoriented to place and situation. Metsa 36  Patient report phoned to [facility]. RN made aware of ordering Telesitter. 3097  Patient transported to K per bed.  Ed updated per phone.
1135 Patient arrived to unit from surgery via bed. Patient transferred to ICU bed and placed on continuous ICU bedside monitor. Patient admitted for Other specified hypotension [I95.89]  Closed head injury, initial encounter [S09.90XA]  Fall, initial encounter [W19. XXXA]  Laceration of scalp, initial encounter [S01.01XA]  Traumatic head injury with multiple lacerations, initial encounter [S09.90XA, S01.91XA]. Vitals obtained. See flowsheets. Patient's IV access includes rac,rfa,lt fa. . Current infusions and rates of infusion include blood, ns. Assessment completed by miryam. Two nurse skin assessment completed by Priscilla Brito and jane. See flowsheets for assessment details. Policies and procedures of ICU unable to be explained to patient at this time. Family member(s)/representative(s) present at time of admission include . Patient rights explained to family member(s)/representatives and patient, as able. Patient/patient's family member(s)/representative(s) N/A to have physician notified of their admission. All questions posed by patient's family member(s)/representative(s) and patient answered at this time. 1140 Blood infused in left  fa. Iv rac infiltrated,removed line. Ipt on vent orally intubate. Head dressing dry. stiff c collar in place. pt incontinent of large amt urine. 2 skin tears on rt hand. One 3nm other 6 cm long. Cleaned with soap and water. Covered with adaptic then 4x4 and kerlex. mrsa vre swabs obtained. 1150 placed chase, urine culture sent. [id number] lab here T&C COMPLETED.  1204 started 3rd  of 4 unit of blood for hypotension. 65  and family friend  Андрей Gurrola in to visit. updated. Questions answered. 1250 to ct scan per bed, with nurse. Pan Scan completed. 4th unit blood started  1320 returned to room. changed c collar to aspen maintaining c-spine control. [address] to [facility]. Family in updated.
1135 Patient arrived to unit from surgery via bed. Patient transferred to ICU bed and placed on continuous ICU bedside monitor. Patient admitted for Other specified hypotension [I95.89]  Closed head injury, initial encounter [S09.90XA]  Fall, initial encounter [W19. XXXA]  Laceration of scalp, initial encounter [S01.01XA]  Traumatic head injury with multiple lacerations, initial encounter [S09.90XA, S01.91XA]. Vitals obtained. See flowsheets. Patient's IV access includes rac,rfa,lt fa. . Current infusions and rates of infusion include blood, ns. Assessment completed by miryam. Two nurse skin assessment completed by Vero Simental and jane. See flowsheets for assessment details. Policies and procedures of ICU unable to be explained to patient at this time. Family member(s)/representative(s) present at time of admission include . Patient rights explained to family member(s)/representatives and patient, as able. Patient/patient's family member(s)/representative(s) N/A to have physician notified of their admission. All questions posed by patient's family member(s)/representative(s) and patient answered at this time. 1140 Blood infused in left  fa. Iv rac infiltrated,removed line. Ipt on vent orally intubate. Head dressing dry. stiff c collar in place. pt incontinent of large amt urine. 2 skin tears on rt hand. One 3nm other 6 cm long. Cleaned with soap and water. Covered with adaptic then 4x4 and kerlex. mrsa vre swabs obtained. 1150 placed chase, urine culture sent. [id number] lab here T&C COMPLETED.  1204 started 3rd  of 4 unit of blood for hypotension. 65  and family friend  Moni Worley in to visit. updated. Questions answered. 1300 to ct scan per bed, with nurse. Pan Scan completed. 4th unit blood started  1320 returned to room. changed c collar to aspen maintaining c-spine control. [address] to [facility]. Family in updated.
[address] [facility]  Occupational Therapy  Daily Note  Time:   Time In: 6210  Time Out: 09  Timed Code Treatment Minutes: 26 Minutes  Minutes: 26          Date: 3/31/2023  Patient Name: Vidya Mckeon,   Gender: female      Room: [facility]006-A  MRN: [id number]  : 1940  (79 y.o.)  Referring Practitioner: VERO Garcia CNP  Diagnosis: Other specified hypotension  Additional Pertinent Hx: Vidya Mckeon is a 79 y.o. female with past medical history of frequent falls likely secondary to Parkinson's Disease, history of memory disorder who initially presented to [facility] ED [facility] 23 after an unwitnessed fall at home with a head laceration with cervical collar on and with significant pain. Patient's head was noted to have significant bleeding and distress for which the decision was made for the patient to be admitted, got intubated and was immediately taken to the OR for emergent surgical intervention for closure of head flap and control of bleeding. She was transferred to the ICU postoperatively with administration of 2 units of PRBCs for close hemodynamic monitoring and mechanical ventilator management. CT Thoracic Spine showing compression fracture of T12, but old according to Dr. Anastasia Steve   CT Lumbar spine showing moderate severity spinal canal stenosis  CT Cervical Spine showing no evidence of fracture   CT Head showing no evidence of hemorrhage, hydrocephalus, midline shift, or mass effect, no evidence of skull fracture    Restrictions/Precautions:  Restrictions/Precautions: General Precautions, Fall Risk  Position Activity Restriction  Spinal Precautions: No Bending, No Lifting, No Twisting  Other position/activity restrictions: Low stimulation guidelines     SUBJECTIVE: RN okayed OT session. Upon arrival patient was resting in bed. Pt was agreeable to OT session. Pt is pleasant throughout session.      PAIN: 0/10: Pt denies     Vitals: Vitals not assessed per
1430- Patient becoming agitated and impulsive. Patient up to couch at this time. Telesitter replaced with live sitter for patient safety. 1440- patient insisting she is leaving, yelling at sitter, and stating she will report staff. 1445- message sent to [facility], to notify her of agitation and to request medication to help. Order for 5 mg of IM haldol one time dose. 1450- IM haldol given per order. Sitter remains in patient's room. 1520- Patient resting with eyes closed at this time.
[address] [address]  INPATIENT PHYSICAL THERAPY  DAILY NOTE  [facility] 7K - 7K-06/006-A    Time In: 1320  Time Out: 1354  Timed Code Treatment Minutes: 34 Minutes  Minutes: 34          Date: 3/31/2023  Patient Name: Chelsey David,  Gender:  female        MRN: [id number]  : 1940  (79 y.o.)     Referring Practitioner: VERO Patrick CNP  Diagnosis: other specified hypotension  Additional Pertinent Hx: Per EMR: This is an 79year old female with history of frequent falls as well as Parkinson's Disease who presents to [facility] ED on  after an unwitnessed fall at home with a head laceration. Patient presented to the ED in a cervical collar with significant pain. Patient was noted to have significant bleeding and distress for which the decision was made for the patient to be admitted with fetanyl, etomidate, and succinylcholine. The patient was taken to the OR emergently for a closure of head flap and control of bleeding. The patient was transferred to the ICU postoperatively with administration of 2 units of PRBCs. Critical care service was consulted for ventilator management. Pt presents with Scalp laceration and T12 compression fracture with no plan for intervention. H/o aortic aneurysm, Parkinson's     Prior Level of Function:  Lives With: Spouse (\"Ed\")  Type of Home: Apartment (2nd floor apartment.)  Home Layout: One level  Home Access: Elevator  Home Equipment: Will Klippel, rolling   Bathroom Shower/Tub: Walk-in shower  Bathroom Toilet: Standard  Bathroom Accessibility: Accessible    Receives Help From: Family  ADL Assistance: Independent  Homemaking Assistance: Independent (Spouse assist with laundry and cooking. Friend comes over 1x/week to clean.)  Homemaking Responsibilities: Yes  Ambulation Assistance: Independent  Transfer Assistance: Independent  Active : Yes  Additional Comments: Pt states she is mod I with RW PTA.     Restrictions/Precautions:  Restrictions/Precautions:
[address] [address]  INPATIENT PHYSICAL THERAPY  DAILY NOTE  [facility] 7K - 7K-06/006-A    Time In: 0900  Time Out: 09  Timed Code Treatment Minutes: 29 Minutes  Minutes: 29          Date: 3/30/2023  Patient Name: Nimisha Maya,  Gender:  female        MRN: [id number]  : 1940  (79 y.o.)     Referring Practitioner: VERO Mallory CNP  Diagnosis: other specified hypotension  Additional Pertinent Hx: Per EMR: This is an 79year old female with history of frequent falls as well as Parkinson's Disease who presents to [facility] ED on  after an unwitnessed fall at home with a head laceration. Patient presented to the ED in a cervical collar with significant pain. Patient was noted to have significant bleeding and distress for which the decision was made for the patient to be admitted with fetanyl, etomidate, and succinylcholine. The patient was taken to the OR emergently for a closure of head flap and control of bleeding. The patient was transferred to the ICU postoperatively with administration of 2 units of PRBCs. Critical care service was consulted for ventilator management. Pt presents with Scalp laceration and T12 compression fracture with no plan for intervention. H/o aortic aneurysm, Parkinson's     Prior Level of Function:  Lives With: Spouse (\"Ed\")  Type of Home: Apartment (2nd floor apartment.)  Home Layout: One level  Home Access: Elevator  Home Equipment: Albino Hawks, rolling   Bathroom Shower/Tub: Walk-in shower  Bathroom Toilet: Standard  Bathroom Accessibility: Accessible    Receives Help From: Family  ADL Assistance: Independent  Homemaking Assistance: Independent (Spouse assist with laundry and cooking. Friend comes over 1x/week to clean.)  Homemaking Responsibilities: Yes  Ambulation Assistance: Independent  Transfer Assistance: Independent  Active : Yes  Additional Comments: Pt states she is mod I with RW PTA.     Restrictions/Precautions:  Restrictions/Precautions:
Arcelia Lynch  Daily Progress Note    Pt Name: Nimisha Maya  Medical Record Number: [id number]  Date of Birth 1940   Today's Date: 3/27/2023    HD: # 1    CC: Fall    ASSESSMENT  1. Active Hospital Problems    Diagnosis Date Noted    Traumatic head injury with multiple lacerations, initial encounter [S09.90XA, S01.91XA] 03/26/2023    Scalp laceration [S01.01XA] 03/26/2023      PROCEDURES  3/27/2023 Extubated  3/26/2023 OR for control of active scalp hemorrhage and closure of degloving scalping flap wound of the scalp 7x7cm. 3/26/2023 Intubated     PLAN  Admitted to ICU under trauma services. [facility]   - Fall precautions    - PT/OT to eval and treat   - Telesitter at bedside      Traumatic head injury, Scalp laceration   -3/26: Control of active scalp hemorrhage and closure of degloving scalping flap wound of the scalp 7x7cm by Dr. Miladis Lynch    - 3/26: Hgb 9.5 on admission   - 3/26 S/P total of 4 units PRBC's transfused   - Bacitracin TID   - Pain control   - 3/27: Hgb stable at 11.3 today      Chronic conditions: T12 compression fracture, aortic aneurysm, Parkinson's, Dementia, Orthostatic hypotension, frequent falls, HLD, VAL.    -Continue home medications    Consults: palliative care     Pain Management              - Tylenol BID     Prophylaxis: SCD's, Incentive Spirometry, Protonix              Diet as tolerated       Telemetry monitoring  Regular Neurovascular Checks  Repeat Labs Tomorrow AM  Send UA with reflex  PT/OT/SLP Eval and Treat  Up with assistance     Planned Discharge, pending clinical course        SUBJECTIVE  Patient seen and examined today in ICU. Denies any pain. Denies headache, dizziness, or lightheadedness. Denies chest pain or SOB. Denies abdominal pain or nausea. Denies dysuria or hematuria. Hypotensive today, total of 2L bolus and albumin administered. Likely medication related. Extubated today.  Plan
Assessment: This was a follow-up spiritual care encounter as a part of rounds. Patient, Leelee Flores, was responsive during the introduction of this visit as she nodded her head. No family present at this time. Interventions:   provided supportive presence with patient. Outcomes:  Patient nodded that it had been a long and painful day for her. She fell asleep during visit soon after  arrived. Plan:  Spiritual care team will remain available to support patient and family, PRN. [address] [place], [place]. [address] [place] [address] Etienne Bryant, [address]  [phone]
Assessment: This was a spiritual care encounter in response to [facility] Supervisor request for family support during patient's surgery.  met with patient's spouse, Paddy Bah (\"Ed\"), in the OR waiting area and then escorted him to the ICU waiting area when patient was transported to the unit. Interventions:   provided empathic listening and facilitated storytelling with family about patient.  facilitated communication with medical team and provided information to family regarding  services and availability. Outcomes:  Ed shared about patient and the recent health issues she has had. He shared about his life together with patient and his hopes for her recovery. Ed verbalized the family and Mandaen support that he has during this time. Ed expressed relief after being able to talk to surgeon about patient's surgery and prognosis. Plan:  Spiritual care team will remain available to support patient and family, PRN. [address] [place], [place]. [address] [place] [address] Etienne Bryant, [address]  [phone]
Assessment: This was an attempted spiritual care visit as a part of rounds.  collaborated with RN on patient status; per RN, patient was somewhat disoriented and is finally settled for the night. RN requested  visit another time. Plan of Care:   team will remain available for patient/family, PRN. [facility]. [address] [place], [place] [address]wey Britney Bryant, [address]  [phone]
CRITICAL CARE PROGRESS NOTE      Patient:  Aldair Strange    Unit/Bed:4D-08/008-A  YOB: 1940  MRN: [id number]   PCP: Rashida Hernandez MD  Date of Admission: 3/26/2023  Chief Complaint: Mechanical Fall/Trauma by Fall    Assessment and Plan:    Scalp laceration with active hemorrhage, 2/2 mechanical fall, initial encounter  S/P Degloving scalp flap wound to the scalp (7x7 cm):  S/p Mechanical Fall (3/26/23): Unwitnessed with reported head trauma  Patient presented to ED with C-collar in place and large scalp wound  GCS 14, on Admission   admitted to trauma service; wound care, and pain medication optimization per trauma attending  CT Thoracic Spine showing compression fracture of T12, but old according to Dr. Keyon Naylor  CT Lumbar spine showing moderate severity spinal canal stenosis  CT Cervical Spine showing no evidence of fracture  CT Head showing no evidence of hemorrhage, hydrocephalus, midline shift, or mass effect, no evidence of skull fracture  FAST negative for pathologic free fluid  DVT prophylaxis per trauma attending  [address] per trauma attending   Acute Hypoxic Respiratory Failure, 2/2 trauma concerns for airway protection - S/P intubation 03/26, Resolved. Drug induced Hypotension: received fentanyl, etomidate, succinylcholine during intubation, requiring  low dose Levophed  Tachycardia at 110s, could be r/t sedation vs pain - Improved.   Patient received 500 cc NS bolus this morning, BP and HR improved  Extubated around 10:30 am today (3/27/23)  Passed bedside swallow evaluation  Acute Normocytic Anemia, likely 2/2 trauma  S/P 2 units PRBC transfusion  Hgb stable at >10  Recommendation for iron studies, Vitamin B12/Folate, Reticulocyte count to rule out other contributing etiology  Monitor for signs and symptoms of additional pathology  Hypoalbuminemia, could be 2/2 poor diet and poor nutrition absorption  Recommendation for dietitian consult  Hx
Chart review for SBIRT per trauma service. Per [facility], pt has a history of Dementia. Pt not appropriate, unable to complete.
[facility]  PHYSICAL THERAPY MISSED TREATMENT NOTE  [facility] 7K    Date: 3/31/2023  Patient Name: Eron Ewing        MRN: [id number]   : 1940  (79 y.o.)  Gender: female   Referring Practitioner: VERO Frank CNP  Diagnosis: other specified hypotension         REASON FOR MISSED TREATMENT:  Missed Treat. Pt reporting \"its too early\" despite encouragement cont to refuse.  Will check back later if able
[facility]  OCCUPATIONAL THERAPY MISSED TREATMENT NOTE  [facility] 7K  7K-06/006-A      Date: 2023  Patient Name: Della Mata        CSN: [id number]   : 1940  (79 y.o.)  Gender: female   Referring Practitioner: VERO Heard CNP  Diagnosis: Other specified hypotension         REASON FOR MISSED TREATMENT: Patient Unavailable Patient currently had visitors in the room, requesting later treatment session. Will attempt back as time allows.
[facility]  INPATIENT PHYSICAL THERAPY  EVALUATION  [facility] 7K - 7K-06/006-A    Time In: 8862  Time Out: 1444  Timed Code Treatment Minutes: 10 Minutes  Minutes: 20          Date: 3/29/2023  Patient Name: Debbi Chan,  Gender:  female        MRN: [id number]  : 1940  (79 y.o.)      Referring Practitioner: VERO Palomino CNP  Diagnosis: other specified hypotension  Additional Pertinent Hx: Per EMR: This is an 79year old female with history of frequent falls as well as Parkinson's Disease who presents to [address] [place] [address] ED on  after an unwitnessed fall at home with a head laceration. Patient presented to the ED in a cervical collar with significant pain. Patient was noted to have significant bleeding and distress for which the decision was made for the patient to be admitted with fetanyl, etomidate, and succinylcholine. The patient was taken to the OR emergently for a closure of head flap and control of bleeding. The patient was transferred to the ICU postoperatively with administration of 2 units of PRBCs. Critical care service was consulted for ventilator management. Pt presents with Scalp laceration and T12 compression fracture with no plan for intervention. H/o aortic aneurysm, Parkinson's     Restrictions/Precautions:  Restrictions/Precautions: General Precautions, Fall Risk  Position Activity Restriction  Spinal Precautions: No Bending, No Lifting, No Twisting  Other position/activity restrictions: Low stimulation guidelines    Subjective:  Chart Reviewed: Yes  Patient assessed for rehabilitation services?: Yes  Family / Caregiver Present: No  Subjective: RN approved session. Pt pleasantly agrees for PT session. She is noted to have a live sitter. Pt confused throughout session requesting to leave.     General:  Overall Orientation Status: Impaired  Vision: Impaired  Vision Exceptions: Wears glasses at all times  Hearing: Within functional limits       Pain: 0/10:
[facility]  INPATIENT SPEECH THERAPY  [facility] 7K  DAILY NOTE    TIME   SLP Individual Minutes  Time In: 1147  Time Out: 8975  Minutes: 9  Timed Code Treatment Minutes: 9 Minutes       Date: 3/31/2023  Patient Name: Mary Lou Rivera      CSN: [id number]   : 1940  (79 y.o.)  Gender: female   Referring Physician: Patterson Bosworth, MD  Diagnosis: Traumatic head injury with multiple lacerations, initial encounter  Precautions: fall risk  Current Diet: Soft and Bite Size Diet with Thin Liquids  Swallowing Strategies:  Full Upright Position, Small Bite/Sip, Medications Crushed with Puree, Direct 1:1 Supervision, Limit Distractions, and Monitor for Fatigue    Date of Last MBS/FEES: Not Applicable    Pain:  No pain reported. Subjective:  Patient seen with RN approval. Patient sitting upright with family and friends present at bedside. Patient pleasant and cooperative    Short-Term Goals:  SHORT TERM GOAL #1:  Goal 1: Patient will consume soft and bite sized diet with thin liquids utilizing compensatory swallow strategies of small sips and upright positioning with no overt s/s of aspiration or pulmonary distress to maintain adequate nutrition and hydration means. INTERVENTIONS: Not formally assessed - however, observed patient with afternoon pills in puree. No s/s of pulmonary distress or aspiration present. SHORT TERM GOAL #2:  Goal 2: Patient will complete MBS should it be clinically indicated to r/o airway invasion and ensure safest diet level  INTERVENTIONS:  Not indicated at this time.      SHORT TERM GOAL #3:  Goal 3: Patient will complete BASIC executive fnuctioning tasks (orientation, biographical informaion) with 80% accuracy mod cues to ensure participation in ADL/IADL task completion  INTERVENTIONS:  Biographical information:  : indep  [place]: indep  Career: indep  Siblings: indep  Places worked upon graduation: \"[place]\" min cues for where [employer]"      SHORT
Occupational Therapy  [facility] [address]  OCCUPATIONAL THERAPY MISSED TREATMENT NOTE  [facility] 7K  7K-06/006-A      Date: 3/28/2023  Patient Name: Chelsey David        CSN: [id number]   : 1940  (79 y.o.)  Gender: female                REASON FOR MISSED TREATMENT:  Pt still with bedrest orders in chart in AM when OT attempted. Nurse manager reaching out to trauma NP to update/clarify orders as most recent trauma note stated up with assist. Will check back as time allows.
Ousmane Morales  Daily Progress Note    Pt Name: No Medellin  Medical Record Number: [id number]  Date of Birth 1940   Today's Date: 4/1/2023    HD: # 6    CC: No complaints    ASSESSMENT  1. Active Hospital Problems    Diagnosis Date Noted    Traumatic head injury with multiple lacerations, initial encounter [S09.90XA, S01.91XA] 03/26/2023    Scalp laceration [S01.01XA] 03/26/2023      PROCEDURES  3/27/2023 Extubated  3/26/2023 OR for control of active scalp hemorrhage and closure of degloving scalping flap wound of the scalp 7x7cm. 3/26/2023 Intubated     PLAN  Admitted to ICU under trauma services. -3[facility]27[facility] [facility] by Fall   - Fall precautions    - PT/OT to continue to treat   - Telesitter at bedside    Traumatic head injury, Scalp laceration   -3/26: Control of active scalp hemorrhage and closure of degloving scalping flap wound of the scalp 7x7cm by Dr. Diego Morales    - 3/26: Hgb 9.5 on admission   - 3/26 S/P total of 4 units PRBC's transfused   - Bacitracin TID   - Pain control   - 3/27: Hgb stable at 11.3 today   - 3/28: Staples intact with no bleeding or drainage. Compression fracture T12, subacute to chronic   - CT thoracic spine here noted fracture lines visible with underlying sclerosis, possible subacute. Patient was seen at OS in [facility] s/p fall on 3/3/23 and CT lumbar spine read as acute compression fracture of superior endplate S00. Orthopedic surgery appears to have been consulted during admission and referred to outpatient spine follow up and reported if pain worsens then TLSO brace. - Patient reported chronic back pain on exam. No midline tenderness to palpation. Discussed with Dr. Diego Morales, no plans for spine consultation at this time.    - 3/29: No reported back pain today and still no midline thoracic and lumbar tenderness to palpation    Acute blood loss anemia   - S/p 4 units of pRBCs this admission   - 3/28: Hgb
[facility] Adult Intravenous to Oral Protocol    pantoprazole changed to PO per [facility] P&T IV to PO protocol.     Rohini Fishman PharmD 3/28/2023 2:48 PM
Patient Weaning Progress    The patient's vent settings was able to be weaned this shift. Ventilator settings that were weaned              [] Mode   [x] Pressure support weaned   [x] Fio2 weaned   [] Peep weaned    SBT Readiness form filled out? [x]Yes        []No    Spontaneous weaning trial  was not attempted. Unable to get agreement for goals because no family is present and patient cannot respond.
Patient has been successfully weaned from Mechanical Ventilation. RSBI before extubation was 21 with EtCO2 of na and SpO2 of 100 on 21% FiO2. Patient extubated and placed on room air. Post extubation SpO2 is 98% with HR  114 bpm and RR 17 breaths/min. Patient had strong cough that was productive of yellow sputum. Extubation Well tolerated by patient. Salazar Avery
Pt admitted to  7K6 via cart/stretcher. Transfer from . Complaints: None. IV none infusing into the forearm left, condition patent and no redness at a rate of 0 mls/ hour with about N/A mls in the bag still. IV site free of s/s of infection or infiltration. Vital signs obtained. Assessment and data collection initiated. Two nurse skin assessment performed by John FERRELL and Jose Avila RN. Oriented to room. Explained patients right to have family, representative or physician notified of their admission. Patient has Declined for physician to be notified. Patient has Declined for family/representative to be notified. The patient is interested in [facility]. [facility]has meds to beds program?:  No    Policies and procedures for  explained. All questions answered with no further questions at this time. Fall prevention and safety brochure discussed with patient. Bed alarm on. Call light in reach.
Pt intubated by dr Buffy Ramos on first attempt with 7.5 ett. Good bilat breath sounds and color change on co2 detector. Ett secured at 23 cm. Pt ventilated with ambu bag and mask  using 100% fio2 prior to intubation and post intubation. Pt transported to surgery using ambu bag and 100% fio2. Anesthesia took over in surgery.
Pt lasted an hour on Spontaneous trial, pt started having a RR lower then 8 so she was switched back to PC at this time.
Rolena Late Dr. Terence Bryant  Daily Progress Note    Pt Name: Rao Rosas  Medical Record Number: [id number]  Date of Birth 1940   Today's Date: 3/28/2023    HD: # 2    CC:No complaints    ASSESSMENT  1. Active Hospital Problems    Diagnosis Date Noted    Traumatic head injury with multiple lacerations, initial encounter [S09.90XA, S01.91XA] 03/26/2023    Scalp laceration [S01.01XA] 03/26/2023      PROCEDURES  3/27/2023 Extubated  3/26/2023 OR for control of active scalp hemorrhage and closure of degloving scalping flap wound of the scalp 7x7cm. 3/26/2023 Intubated     PLAN  Admitted to ICU under trauma services. -3/27: transfer to [address] [place] [address] by Fall   - Fall precautions    - PT/OT to eval and treat   - Telesitter at bedside   - Pan CT imaging with spinal recons obtained and reviewed    Traumatic head injury, Scalp laceration   -3/26: Control of active scalp hemorrhage and closure of degloving scalping flap wound of the scalp 7x7cm by Dr. Terence Bryant    - 3/26: Hgb 9.5 on admission   - 3/26 S/P total of 4 units PRBC's transfused   - Bacitracin TID   - Pain control   - 3/27: Hgb stable at 11.3 today   - 3/28: Staples intact with no bleeding or drainage. Compression fracture T12, subacute to chronic   - CT thoracic spine here noted fracture lines visible with underlying sclerosis, possible subacute. Patient was seen at OSH in [facility] s/p fall on 3/3/23 and CT lumbar spine read as acute compression fracture of superior endplate H97. Orthopedic surgery appears to have been consulted during admission and referred to outpatient spine follow up and reported if pain worsens then TLSO brace. - Patient reported chronic back pain on exam. No midline tenderness to palpation. Discussed with Dr. Terence Bryant, no plans for spine consultation at this time. Acute blood loss anemia   - S/p 4 units of pRBCs this admission   - 3/28: Hgb dropped to 8.1 this AM from 11.3.  Repeat hgb this
[facility] Continence Nurse  Progress Note       Merryl Erp  AGE: 79 y.o. GENDER: female  : 1940  UNIT: 7K-06/006-A  TODAY'S DATE:  3/29/2023  ADMISSION DATE: 3/26/2023 10:04 AM  Subjective:     Reason for [facility] Evaluation and Assessment: stage 2 on coccyx      Merryl Erp is a 79 y.o. female referred by:   [] Physician/PA/APRN  [x] Nursing  [] Other:     Wound Identification:  Wound Type:  MASD  Contributing Factors:  moisture and use of depends    Objective:     Julian Risk Score: Julian Scale Score: 17    Assessment:     Encounter: Present to patient room. Patient in chair upon arrival. Assessment and photo to follow. Assisted to standing position. Old dressing removed. Patient noted to have blanchable MASD to gluteal cleft, likely from anatomy and use of depends. Triad applied to wound. Staff to apply BID and PRN. Assisted patient to sitting position. Recommend staff to discontinue use of depends and utilize chux pads. Recommend waffle cushion for chair. Continue to turn every 2 hours and PRN. Patient in chair, call light in reach. TS in room. Call with concerns and as needed. 3/29/23    Wound type: MASD gluteal cleft  Wound size: 0.1cm x 0.1cm x 0.05cm  Undermining or Tunneling: None  Wound assessment/color: Blanchable   Drainage amount: Minimal   Drainage description: Serosanguinous  Odor: None  Margins: Attached  Ольга wound: MASD, blanchable  Exposed structure: None    Plan:     Treatment Recommendations:   Coccyx- Cleanse wound with normal saline or wound cleanser and gauze. Pat dry with clean gauze. Apply Triad barrier cream to wounds twice daily and PRN. If cream becomes soiled, wipe off top layer and reapply. Discontinue use of depends. Utilize moisture wicking underpads. Use waffle cushion in chair.      Specialty Bed Required :   [x] Low Air Loss   [x] Pressure Redistribution  [] Fluid Immersion- Dolphin  [] Bariatric  [] RotoProne   [] Other:     Discharge
Edema)  Current Body Weight: 152 lb 5.4 oz (69.1 kg) (3/26; No Edema), 117.2 % IBW. Weight Source: Other (Comment) (Actual)  Current BMI (kg/m2): 24.6  Usual Body Weight:  (Per EMR 4/21/22 168 lb, 3/22/23 138 lb 6 oz)     Weight Adjustment For: No Adjustment                 BMI Categories: Normal Weight (BMI 22.0 to 24.9) age over 72    Estimated Daily Nutrient Needs:  Energy Requirements Based On: Formula  Weight Used for Energy Requirements: Current (69.1 kg)  Energy (kcal/day): ~6541-9063 ([place] Jeor AF 1.3 SF 1 vs 25 kcal/kg)  Weight Used for Protein Requirements: Current (69.1 kg)  Protein (g/day): ~97 grams or more (1.4 g/kg)         Nutrition Diagnosis:   Increased nutrient needs related to increase demand for energy/nutrients as evidenced by wounds    Nutrition Interventions:   Food and/or Nutrient Delivery: Continue Current Diet, Continue Oral Nutrition Supplement  Nutrition Education/Counseling: Education not appropriate  Coordination of Nutrition Care: Continue to monitor while inpatient       Goals:     Goals: PO intake 75% or greater, by next RD assessment       Nutrition Monitoring and Evaluation:   Behavioral-Environmental Outcomes: None Identified  Food/Nutrient Intake Outcomes: Food and Nutrient Intake, Supplement Intake  Physical Signs/Symptoms Outcomes: Biochemical Data, GI Status, Fluid Status or Edema, Weight, Skin, Nutrition Focused Physical Findings    Discharge Planning:     Too soon to determine     Savana Fischer, [address]  Contact: [phone]
Slow Processing, Decreased Insight, Decreased Problem Solving, and Decreased Safety Awareness    ADL:   Grooming: Contact Guard Assistance. To stand at sink and wash hands and complete oral care no LOB noted  Toileting: Contact Guard Assistance. Toilet Transfer: Contact Guard Assistance. Gail Barber BALANCE:  Standing Balance: Contact Guard Assistance. BED MOBILITY:  Not Tested    TRANSFERS:  Sit to Stand:  Contact Guard Assistance. From chair and toilet  Stand to Sit: [address] To chair and toilet    FUNCTIONAL MOBILITY:  Assistive Device: Rolling Walker  Assist Level:  Contact Guard Assistance. Distance: To and from bathroom       ADDITIONAL ACTIVITIES:  Patient completed BUE strengthening exercises with skilled education on HEP: completed x12 reps x1 set with AROM in all joints and all planes in order to improve UE strength and activity tolerance required for BADL routine and toilet / shower transfers. Patient tolerated , requiring  rest breaks. Patient also required verbal and visual cues for technique. ASSESSMENT:     Activity Tolerance:  Patient tolerance of  treatment: Fair treatment tolerance      Discharge Recommendations: Subacute/skilled nursing facility  Equipment Recommendations: Equipment Needed: No  Plan: Times Per Week: 5x  Times Per Day: Once a day  Current Treatment Recommendations: Strengthening, Balance training, Functional mobility training, Endurance training, Patient/Caregiver education & training, Equipment evaluation, education, & procurement, Safety education & training, Home management training, Self-Care / ADL    Patient Education  Patient Education: ADL's, Home Exercise Program, and Assistive Device Safety and safety with functional mobility and transfers.      Goals  Short Term Goals  Time Frame for Short Term Goals: Until discharge  Short Term Goal 1: Pt will complete BUE AROM exercises with min vcs for technique to increase indep and endurance with all
hospitalization. INTERVENTIONS:  DNT due to focus on additional STGs. SHORT TERM GOAL #5:  Goal 5: Patietn will complete thought organization (divergent thinking) and visual and verbal reasoning tasks with 80% accuracy mod cues to improve mental flexibility. INTERVENTIONS: DNT due to focus on additional STGs. SHORT TERM GOAL #6:  Goal 6: Patient and family will demonstrate comprehension and return demonstration for low stim guidelines (ACE) to prompte neurological healing  INTERVENTIONS:  Acute Concussion Evaluation (ACE)   Physical Symptoms: 1/10  Cognitive Symptoms: 4/4  Emotional Symptoms: 3/4  Sleep Symptoms: 0/4    Total: 8/22     Patient given Acute Concussion Evaluation (ACE) this date following concerns for closed brain injury and concussion symptoms. Patient scored 8/22 on ACE. It should be noted that a score with concerns for concussion are greater than 12/22. Completed detailed review of the Low Stimulation Environment Guidelines:  1. Keep light dim or limit number of lights on at one time. 2. Keep door to the room closed. 3. Encourage and allow frequent rest breaks. 4. Limit the amount of time the television or radio is turned on & turn off the TV when visitors are present. 5. Limit visitors in the room; no more than 2 at a time. 6. Always identify yourself when entering the room. 8. Consider the amount of visual stimulation (number of pictures, banners, colors, etc). **REMEMBER, the brain is working when it is processing information of any kind. Long-Term Goals:  No LTGs established due to short ELOS.        EDUCATION:  Learner: Patient  Education:  Reviewed ST goals and Plan of Care, Education Related to Potential Risks and Complications Due to Impairment/Illness/Injury, and Education Related to Prevention of Recurrence of Impairment/Illness/Injury  Evaluation of Education: Verbalizes understanding, Needs further instruction, and Family not
healing  INTERVENTIONS:  Acute Concussion Evaluation (ACE)   Physical Symptoms: 1/10  Cognitive Symptoms: 2/4  Emotional Symptoms: 1/4  Sleep Symptoms: 2/4    Total: 6/22     Patient given Acute Concussion Evaluation (ACE) this date following concerns for closed brain injury and concussion symptoms. Patient scored 6/22 on ACE. It should be noted that a score with concerns for concussion are greater than 12/22. Long-Term Goals:  No LTGs established due to short ELOS. EDUCATION:  Learner: Patient  Education:  Reviewed diet and strategies, Reviewed signs, symptoms and risks of aspiration, Reviewed ST goals and Plan of Care, Reviewed recommendations for follow-up, Education Related to Potential Risks and Complications Due to Impairment/Illness/Injury, Education Related to Prevention of Recurrence of Impairment/Illness/Injury, Education Related to [employer] and Wellness, and Home Safety Education  Evaluation of Education: Needs further instruction, No evidence of learning, and Family not present    ASSESSMENT/PLAN:  Activity Tolerance:  Patient tolerance of  treatment: good. Assessment/Plan: Patient progressing toward established goals. Continues to require skilled care of licensed speech pathologist to progress toward achievement of established goals and plan of care. .     Plan for Next Session: ACE, orientation, advanced PO trials  Discharge Recommendations: Continue to Assess Pending Progress     Toshia Dobson M.S., [facility]
without Stair Climbing Raw Score : 15  AM-PAC Inpatient without Stair Climbing T-Scale Score : 43.03    ASSESSMENT:  Assessment: Patient progressing toward established goals. Activity Tolerance:  Patient tolerance of  treatment: good. Pt demonstrates impairments with strength, balance, endurance, safety, independence, safety awareness, requires hands on assistance for safety with mobility and would benefit from continued skilled PT improve these impairments, pt unsafe to return home at this time, pt will require inpatient skilled therapy stay. Equipment Recommendations:Equipment Needed: No  Discharge Recommendations: Subacte/Skilled Nursing Facility  Plan: Current Treatment Recommendations: Strengthening, Balance training, Functional mobility training, Transfer training, Endurance training, Neuromuscular re-education, Gait training, Safety education & training, Patient/Caregiver education & training, Equipment evaluation, education, & procurement, Therapeutic activities  General Plan:  (5x T)    Patient Education  Patient Education: Plan of Care, Transfers, Gait, Verbal Exercise Instruction    Goals:  Patient Goals : none stated  Short Term Goals  Time Frame for Short Term Goals: by hospital d/c  Short Term Goal 1: Pt to complete supine <->sit with log roll with CGA for ease with getting in and out of bed  Short Term Goal 2: Pt to complete sit <->stand with RW and CGA for safe transfers  Short Term Goal 3: Pt to ambulate >=30' with RW and CGA for safe mobility in her environment  Long Term Goals  Time Frame for Long Term Goals : NA due to short ELOS    Following session, patient left in safe position with all fall risk precautions in place.
time frame for expected duration of plan of care. If no long-term goals established, a short length of stay is anticipated. Goals:  Patient goals : Go Home  Short Term Goals  Time Frame for Short Term Goals: Until discharge  Short Term Goal 1: Pt will complete BUE AROM exercises with min vcs for technique to increase indep and endurance with all self cares and transfers. Short Term Goal 2: Pt will complete standing tolerance x 5 minutes with with SBA and min vcs for safety to increase indep and endurance with all sinkside grooming. Short Term Goal 3: Pt will complete functional mobility to/from BR and HH distances with SBA and min vcs for safety to increase indep and endurance with all toileting. Short Term Goal 4: Pt will complete LB dressing with LHAE PRN and min A to increase indep and endurance within home environment. Additional Goals?: No         Following session, patient left in safe position with all fall risk precautions in place.
[id number]
Koko Wright MD MD, patient is sitting up awake alert but still confused scalp staples intact and wound looks good appreciate hospitalist willing to take over care will be available pending how long patient is in the hospital the staples may be able to remove prior to discharge call when ready for discharge  4/1/2023   7:07 PM
No acute findings. This document has been electronically signed by: Kassandra Luo MD on 03/27/2023 08:46 PM       Total time spent in care of patient: 15 minutes collectively between subjective/objective examination, chart review, documentation, clinical reasoning and discussion with attending regarding plan/interval changes. Patient seen and examined independently by me 3/29/2023     I personally supervised the PA/NP in the evaluation, management and development of the treatment plan for Damaris Garzon  on the same date of service as above. I personally interviewed Damaris Garzon   and  discussed his review of symptoms as able due to the patient's condition, as well as performed an individual physical exam on the same   date of service as above. In addition I discussed the patient's condition and treatment options with the patient, if able, and/or designated family if available. I have also reviewed and agree with the past medical,  family and social history updates as well as care plans unless otherwise noted below. All questions were answered. I examined independently and reviewed relevant data myself and may have done so in the context of team rounds. A full chart review was performed by me. I attest that this medical record entry accurately reflects signatures and notations that I made in my capacity as an M. D. when I treated and diagnosed Damaris Garzon on the date of service above     I was responsible for all medical decision making involving this encounter. I identified and/or confirmed all problems associated with this patient encounter by my own direct physical examination of this patient and review of all radiology studies and labwork  that were ordered and available.     Active Hospital Problems    Diagnosis     Traumatic head injury with multiple lacerations, initial encounter [S09.90XA, S01.91XA]     Scalp laceration [S01.01XA]         I  discussed the management of
Results   Component Value Date/Time    LABAERO No Acinetobacter species isolated. 03/26/2023 12:20 PM     No results found for: LABANAE    Urinalysis:      Lab Results   Component Value Date/Time    NITRU POSITIVE 03/31/2023 07:00 AM    WBCUA > 200 03/31/2023 07:00 AM    WBCUA 5-10 03/08/2023 02:15 PM    BACTERIA MANY 03/31/2023 07:00 AM    RBCUA 0-2 03/31/2023 07:00 AM    BLOODU TRACE 03/31/2023 07:00 AM    SPECGRAV 1.014 03/31/2023 07:00 AM    GLUCOSEU NEGATIVE 03/08/2023 02:15 PM       Radiology:  XR CHEST PORTABLE   Final Result   1. No acute findings. This document has been electronically signed by: Janet Villagomez MD on    03/27/2023 08:46 PM      XR CHEST PORTABLE   Final Result   No pulmonary infiltrates. No pneumothorax. **This report has been created using voice recognition software. It may contain minor errors which are inherent in voice recognition technology. **      Final report electronically signed by Dr. Brewer Been on 3/26/2023 2:12 PM      CT HEAD WO CONTRAST   Final Result       1. Scalp skin staples. 2. No intracranial hemorrhage. **This report has been created using voice recognition software. It may contain minor errors which are inherent in voice recognition technology. **      Final report electronically signed by Dr. Brewer Been on 3/26/2023 1:18 PM      CT CHEST W CONTRAST   Final Result      1. Severe compression deformity of T12.   2. Aneurysmal dilatation of the ascending aorta measuring 4.1 cm.   3. Small pericardial effusion. **This report has been created using voice recognition software. It may contain minor errors which are inherent in voice recognition technology. **      Final report electronically signed by Dr Jackelyn Hung on 3/26/2023 1:23 PM      CT ABDOMEN PELVIS W IV CONTRAST Additional Contrast? None   Final Result      1. Cholelithiasis without evidence of acute cholecystitis. 2. Uncomplicated colonic diverticulosis.    3.
examination, chart review, documentation, clinical reasoning and discussion with attending regarding plan/interval changes. Electronically signed by Karuna Escobar PA-C on 3/31/2023 at 11:43 AM  Patient seen and examined independently by me. Above discussed and I agree with CNP. Labs, cultures, and radiographs where available were reviewed. See orders for the updated patient care plan.     Marilee Turpin MD MD,   4/1/2023   1:01 PM
and care team  This includes a direct physical exam as well as all the other encounter activities described above. Time may be discontiguous. Time does not include procedures. Please see our orders that were directed and approved by me if there are any new ones for the updated patient care plan. Above discussed and I agree with documentation and orders placed by [facility] CNP    See any additional comments if needed below for any other updated orders and plans. Patient seen and examined independently by me. Above discussed and I agree with CNP. Labs, cultures, and radiographs where available were reviewed. See orders for the updated patient care plan. Enmanuel Poon MD MD, patient is awake alert but clearly confused she has no recollection about the fall and the surgery performed on her scalp.   However she is pleasant lungs are clear abdomen is soft viewed care coordinators note awaiting pre-CERT for hopeful [employer] hemoglobin noted drop but overall is stable no signs of active bleeding  3/30/2023   9:07 PM      Electronically signed by VERO Prescott CNP on 3/30/2023 at
patient has had a prior aortic valve replacement. The heart size is normal.The mediastinum is not widened. There are no pulmonary infiltrates or effusions. There is no pneumothorax. The pulmonary vascularity is normal.No suspicious osseous lesions are present. No pulmonary infiltrates. No pneumothorax. **This report has been created using voice recognition software. It may contain minor errors which are inherent in voice recognition technology. ** Final report electronically signed by Dr. Justin Dawn on 3/26/2023 2:12 PM    CT LUMBAR RECONSTRUCTION WO POST PROCESS    Result Date: 3/26/2023  PROCEDURE: CT THORACIC RECONSTRUCTION WO POST PROCESS, CT LUMBAR RECONSTRUCTION WO POST PROCESS CLINICAL INFORMATION: blunt trauma on Plavix. Scalp laceration. COMPARISON: No prior study. TECHNIQUE: 3 mm axial CT images were reconstructed through the thoracic and lumbar spine. These are reconstructed from the patient's CT scan of the chest, abdomen and pelvis. IV contrast is present. Sagittal and coronal reconstructions were obtained. All CT scans at this facility use dose modulation, iterative reconstruction, and/or weight-based dosing when appropriate to reduce radiation dose to as low as reasonably achievable. FINDINGS: The thoracic vertebral bodies are normally aligned. There is demineralization. No suspicious osseous lesions are present. There is a compression fracture of T12. This is partially sclerotic. Fracture lines are visible. This is likely subacute. No other fractures are present in the thoracic spine. On the axial images, there is mild spinal canal stenosis posterior to the fracture. No suspicious findings are present in the paraspinal tissues. The lumbar vertebral bodies are normally aligned. There are no compression fractures in the lumbar spine. No pars defects are noted. No suspicious osseous lesions are present. The posterior elements are within appropriate limits.   There is loss of disc height
INFORMATION: blunt trauma on Plavix. Scalp laceration. COMPARISON: No prior study. TECHNIQUE: 3 mm axial CT images were reconstructed through the thoracic and lumbar spine. These are reconstructed from the patient's CT scan of the chest, abdomen and pelvis. IV contrast is present. Sagittal and coronal reconstructions were obtained. All CT scans at this facility use dose modulation, iterative reconstruction, and/or weight-based dosing when appropriate to reduce radiation dose to as low as reasonably achievable. FINDINGS: The thoracic vertebral bodies are normally aligned. There is demineralization. No suspicious osseous lesions are present. There is a compression fracture of T12. This is partially sclerotic. Fracture lines are visible. This is likely subacute. No other fractures are present in the thoracic spine. On the axial images, there is mild spinal canal stenosis posterior to the fracture. No suspicious findings are present in the paraspinal tissues. The lumbar vertebral bodies are normally aligned. There are no compression fractures in the lumbar spine. No pars defects are noted. No suspicious osseous lesions are present. The posterior elements are within appropriate limits. There is loss of disc height throughout. There are small endplate osteophytes. There are no gross abnormalities within the spinal canal. On the axial images, there is moderate severity spinal canal stenosis at the L4-5 level. There are no suspicious findings in the visualized aspects of the retroperitoneum and paraspinal soft tissues. 1. Demineralization. 2. Compression fracture of T12 with 50% height loss. Fracture lines are visible. There is some underlying sclerosis. This is likely subacute. 3. Moderate severity spinal canal stenosis at the L4-5 level due to degenerative changes. **This report has been created using voice recognition software. It may contain minor errors which are inherent in voice recognition technology. **
PM    US Ed Fast Abdomen Limited    Result Date: 3/26/2023  Emir Peters MD     3/26/2023 10:40 AM US Ed Fast Abdomen Limited Date/Time: 3/26/2023 10:40 AM Performed by: Emir Peters MD Authorized by: Doug Rahman MD Procedure details:   Indications comment:  Blunt trauma   Assessment for:  Intra-abdominal fluid Comments:    FAST negative for pathologic free fluid.        Electronically signed by Nandini Kennedy PA-C on 4/1/2023 at 2:11 PM

## 2023-04-02 NOTE — PLAN OF CARE
Care plan reviewed with patient and SPOUSE. Patient and SPOUSE verbalize understanding of the plan of care and contribute to goal setting.      Problem: Neurosensory - Adult  Goal: Achieves stable or improved neurological status  Outcome: Not Progressing  Goal: Achieves maximal functionality and self care  Outcome: Not Progressing     Problem: Respiratory - Adult  Goal: Achieves optimal ventilation and oxygenation  Outcome: Not Progressing     Problem: Musculoskeletal - Adult  Goal: Return mobility to safest level of function  Outcome: Not Progressing  Goal: Maintain proper alignment of affected body part  Outcome: Not Progressing  Goal: Return ADL status to a safe level of function  Outcome: Not Progressing
Consult received.   Please see progress note 03/29/23
Did check iron studies, Vit b12 / folate as pt is anemic. She was started on ferrous sulfate given findings consistent with PETER. Added Trazodone for sleep PRN at night as pt does seem to become more confused throughout the day into the night. Will check a UA to r/o infection. Pending results Hospitalist service will sign off. Needs sitter removed for review for SNF.     Electronically signed by Priscila Pang PA-C on 3/30/2023 at 3:33 PM
Hospitalist following in consultation. Potassium noted at 3.2, has been repleted. Repeat labs in the a.m. Hemoglobin remained stable in the 8 range, CBC in AM.    Palliative care was consulted and changed patient to limited no x4.     Disposition planning with     Electronically signed by Gregory Youssef PA-C on 3/28/2023 at 6:35 PM
Problem: Discharge Planning  Goal: Discharge to home or other facility with appropriate resources  3/28/2023 0031 by Natividad Bailey RN  Outcome: Progressing  Flowsheets (Taken 3/28/2023 0031)  Discharge to home or other facility with appropriate resources: Identify barriers to discharge with patient and caregiver  3/28/2023 0030 by Natividad Bailey RN  Outcome: Progressing  Flowsheets (Taken 3/28/2023 0030)  Discharge to home or other facility with appropriate resources: Identify barriers to discharge with patient and caregiver     Problem: Pain  Goal: Verbalizes/displays adequate comfort level or baseline comfort level  Outcome: Progressing  Flowsheets (Taken 3/28/2023 0031)  Verbalizes/displays adequate comfort level or baseline comfort level: Encourage patient to monitor pain and request assistance     Problem: Safety - Adult  Goal: Free from fall injury  3/28/2023 0031 by Natividad Bailey RN  Outcome: Progressing  Flowsheets (Taken 3/28/2023 0031)  Free From Fall Injury: Instruct family/caregiver on patient safety  3/28/2023 0030 by Natividad Bailey RN  Outcome: Progressing  Flowsheets (Taken 3/28/2023 0030)  Free From Fall Injury: Instruct family/caregiver on patient safety     Problem: Neurosensory - Adult  Goal: Achieves maximal functionality and self care  Outcome: Progressing     Problem: Genitourinary - Adult  Goal: Urinary catheter remains patent  Outcome: Progressing     Problem: Metabolic/Fluid and Electrolytes - Adult  Goal: Hemodynamic stability and optimal renal function maintained  Outcome: Progressing     Care plan reviewed with patient. Patient and verbalize understanding of the plan of care and contribute to goal setting.
Problem: Discharge Planning  Goal: Discharge to home or other facility with appropriate resources  3/29/2023 0253 by Hakeem Sol RN  Outcome: Progressing  Flowsheets (Taken 3/29/2023 0253)  Discharge to home or other facility with appropriate resources:   Identify barriers to discharge with patient and caregiver   Identify discharge learning needs (meds, wound care, etc)   Refer to discharge planning if patient needs post-hospital services based on physician order or complex needs related to functional status, cognitive ability or social support system   Arrange for needed discharge resources and transportation as appropriate     Problem: Pain  Goal: Verbalizes/displays adequate comfort level or baseline comfort level  3/29/2023 0253 by Hakeem Sol RN  Outcome: Progressing  Flowsheets (Taken 3/29/2023 0253)  Verbalizes/displays adequate comfort level or baseline comfort level:   Encourage patient to monitor pain and request assistance   Administer analgesics based on type and severity of pain and evaluate response   Consider cultural and social influences on pain and pain management   Assess pain using appropriate pain scale   Implement non-pharmacological measures as appropriate and evaluate response     Problem: Safety - Adult  Goal: Free from fall injury  3/29/2023 0253 by Hakeem Sol RN  Outcome: Progressing  Flowsheets (Taken 3/29/2023 0253)  Free From Fall Injury: Instruct family/caregiver on patient safety  Note:   Patient has remained free of physical injury during this shift. Safe environment provided, call light within reach, and hourly rounding completed.        Problem: Neurosensory - Adult  Goal: Achieves stable or improved neurological status  3/29/2023 0253 by Hakeem Sol RN  Outcome: Progressing     Problem: Cardiovascular - Adult  Goal: Maintains optimal cardiac output and hemodynamic stability  3/29/2023 0253 by Hakeem Sol RN  Outcome: Hayde De La Cruz
Problem: Discharge Planning  Goal: Discharge to home or other facility with appropriate resources  3/30/2023 0139 by Carina Rutherford RN  Outcome: Progressing  Flowsheets (Taken 3/30/2023 0113)  Discharge to home or other facility with appropriate resources:   Identify barriers to discharge with patient and caregiver   Arrange for needed discharge resources and transportation as appropriate   Identify discharge learning needs (meds, wound care, etc)  Note: Patient plans to go to [address] at discharge. Care manager and social working helping with discharge needs. Problem: Pain  Goal: Verbalizes/displays adequate comfort level or baseline comfort level  3/30/2023 0139 by Carina Rutherford RN  Outcome: Progressing  Flowsheets (Taken 3/30/2023 0116)  Verbalizes/displays adequate comfort level or baseline comfort level:   Encourage patient to monitor pain and request assistance   Assess pain using appropriate pain scale   Administer analgesics based on type and severity of pain and evaluate response   Implement non-pharmacological measures as appropriate and evaluate response  Note: Patient denies pain at this time. Will continue to monitor. Problem: Safety - Adult  Goal: Free from fall injury  3/30/2023 0139 by Carina Rutherford RN  Outcome: Progressing  Flowsheets (Taken 3/30/2023 0116)  Free From Fall Injury:   Instruct family/caregiver on patient safety   Based on caregiver fall risk screen, instruct family/caregiver to ask for assistance with transferring infant if caregiver noted to have fall risk factors  Note: Fall precautions in place. Bed locked in lowest position, bed alarmed utilized. Call light within reach. Non slip socks on when ambulating, side rails up x2. Reminded patient to use call light to call for assistance.        Problem: Neurosensory - Adult  Goal: Achieves stable or improved neurological status  3/30/2023 0139 by Carina Rutherford RN  Outcome: Progressing  Flowsheets (Taken
Problem: Discharge Planning  Goal: Discharge to home or other facility with appropriate resources  4/2/2023 1016 by Luly Harding LPN  Outcome: Progressing  Flowsheets (Taken 4/2/2023 1016)  Discharge to home or other facility with appropriate resources:   Identify barriers to discharge with patient and caregiver   Identify discharge learning needs (meds, wound care, etc)   Arrange for needed discharge resources and transportation as appropriate     Problem: Pain  Goal: Verbalizes/displays adequate comfort level or baseline comfort level  4/2/2023 1016 by Luly Harding LPN  Outcome: Progressing  Flowsheets (Taken 4/2/2023 1016)  Verbalizes/displays adequate comfort level or baseline comfort level:   Administer analgesics based on type and severity of pain and evaluate response   Encourage patient to monitor pain and request assistance   Assess pain using appropriate pain scale   Implement non-pharmacological measures as appropriate and evaluate response     Problem: Safety - Adult  Goal: Free from fall injury  4/2/2023 1016 by Luly Harding LPN  Outcome: Progressing  Flowsheets (Taken 4/2/2023 1016)  Free From Fall Injury:   Instruct family/caregiver on patient safety   Based on caregiver fall risk screen, instruct family/caregiver to ask for assistance with transferring infant if caregiver noted to have fall risk factors     Problem: Neurosensory - Adult  Goal: Achieves stable or improved neurological status  4/2/2023 1016 by Luly Harding LPN  Outcome: Progressing  Flowsheets (Taken 4/2/2023 1016)  Achieves stable or improved neurological status: Assess for and report changes in neurological status     Problem: Cardiovascular - Adult  Goal: Maintains optimal cardiac output and hemodynamic stability  4/2/2023 1016 by Luly Harding LPN  Outcome: Progressing  Flowsheets (Taken 4/2/2023 1016)  Maintains optimal cardiac output and hemodynamic stability: Monitor blood pressure and heart rate     Problem: Skin/Tissue Integrity -
Problem: Discharge Planning  Goal: Discharge to home or other facility with appropriate resources  Outcome: Progressing     Problem: Pain  Goal: Verbalizes/displays adequate comfort level or baseline comfort level  Outcome: Progressing     Problem: Safety - Adult  Goal: Free from fall injury  Outcome: Progressing     Problem: Neurosensory - Adult  Goal: Achieves stable or improved neurological status  Outcome: Progressing  Goal: Achieves maximal functionality and self care  Outcome: Progressing     Problem: Cardiovascular - Adult  Goal: Maintains optimal cardiac output and hemodynamic stability  Outcome: Progressing     Problem: Skin/Tissue Integrity - Adult  Goal: Skin integrity remains intact  Outcome: Progressing  Goal: Incisions, wounds, or drain sites healing without S/S of infection  Outcome: Progressing     Problem: Musculoskeletal - Adult  Goal: Return mobility to safest level of function  Outcome: Progressing  Goal: Maintain proper alignment of affected body part  Outcome: Progressing  Goal: Return ADL status to a safe level of function  Outcome: Progressing     Problem: Gastrointestinal - Adult  Goal: Maintains or returns to baseline bowel function  Outcome: Progressing     Problem: Infection - Adult  Goal: Absence of infection at discharge  Outcome: Progressing     Problem: ABCDS Injury Assessment  Goal: Absence of physical injury  Outcome: Progressing     Problem: Skin/Tissue Integrity  Goal: Absence of new skin breakdown  Description: 1. Monitor for areas of redness and/or skin breakdown  2. Assess vascular access sites hourly  3. Every 4-6 hours minimum:  Change oxygen saturation probe site  4. Every 4-6 hours:  If on nasal continuous positive airway pressure, respiratory therapy assess nares and determine need for appliance change or resting period.   Outcome: Progressing     Problem: Nutrition Deficit:  Goal: Optimize nutritional status  Outcome: Progressing
Problem: Respiratory - Adult  Goal: Achieves optimal ventilation and oxygenation  3/26/2023 1639 by Mane Vega RCP  Outcome: Progressing     Patient continues on ventilator; tolerating well.   Will continue to monitor patients respiratory status  G
Problem: Respiratory - Adult  Goal: Achieves optimal ventilation and oxygenation  3/27/2023 0519 by Paul Haro RCP  Outcome: Progressing                                                Patient Weaning Progress    The patient's vent settings was able to be weaned this shift. Ventilator settings that were weaned              [x] Mode   [x] Pressure support weaned   [] Fio2 weaned   [] Peep weaned    SBT Readiness form filled out? [x]Yes        []No    Spontaneous weaning trial  was attempted. The patient was able to tolerate SBT. RSBI  was 16.7 with EtCO2 of 32 and SpO2 of 98 on 21% FiO2. Spontanteous VT was 597 and RR 10 breaths/min. The patient was on SBT for 15 minutes and remains on Spontaneous mode at this time    Evac tube was  hooked up with continuous low suction(20-30mmHg)      Cuff was  deflated to determine cuff leak. A leak  was detected. Cuff leak was 35%       Unable to get agreement for goals because no family is present and patient cannot respond.
Problem: Respiratory - Adult  Goal: Achieves optimal ventilation and oxygenation  3/27/2023 0919 by Ana Rosa Short, [facility]  Outcome: Progressing                                                 Patient Weaning Progress    The patient's vent settings was able to be weaned this shift. Ventilator settings that were weaned              [x] Mode   [x] Pressure support weaned   [x] Fio2 weaned   [x] Peep weaned    SBT Readiness form filled out? [x]Yes        []No    Spontaneous weaning trial  was attempted. The patient was able to tolerate SBT. RSBI  was 19 with EtCO2 of 34 and SpO2 of 96 on 21% FiO2. Spontanteous VT was 615 and RR 11 breaths/min. The patient was on SBT for 15 minutes     Evac tube was  hooked up with continuous low suction(20-30mmHg)      Cuff was  deflated to determine cuff leak. A leak  was detected. Family mutually agreed on goals.
ordered     Problem: Neurosensory - Adult  Goal: Achieves maximal functionality and self care  3/27/2023 0125 by Costella Day, RN  Flowsheets (Taken 3/26/2023 1945)  Achieves maximal functionality and self care: Monitor swallowing and airway patency with patient fatigue and changes in neurological status     Problem: Neurosensory - Adult  Goal: Absence of seizures  3/27/2023 0125 by Costella Day, RN  Flowsheets (Taken 3/26/2023 1945)  Absence of seizures:   Monitor for seizure activity.   If seizure occurs, document type and location of movements and any associated apnea   If seizure occurs, turn head to side and suction secretions as needed   Support airway/breathing, administer oxygen as needed     Problem: Neurosensory - Adult  Goal: Remains free of injury related to seizures activity  3/27/2023 0125 by Costella Day, RN  Flowsheets (Taken 3/26/2023 1945)  Remains free of injury related to seizure activity:   Maintain airway, patient safety  and administer oxygen as ordered   If seizure occurs, turn patient to side and suction secretions as needed   Reorient patient post seizure   Instruct patient/family to notify RN of any seizure activity   Seizure pads on all 4 side rails     Problem: Respiratory - Adult  Goal: Achieves optimal ventilation and oxygenation  3/27/2023 0125 by Costella Day, RN  Flowsheets (Taken 3/26/2023 1945)  Achieves optimal ventilation and oxygenation:   Assess for changes in respiratory status   Assess for changes in mentation and behavior   Position to facilitate oxygenation and minimize respiratory effort   Assess the need for suctioning and aspirate as needed   Respiratory therapy support as indicated     Problem: Cardiovascular - Adult  Goal: Maintains optimal cardiac output and hemodynamic stability  3/27/2023 0125 by Costella Day, RN  Flowsheets (Taken 3/26/2023 1945)  Maintains optimal cardiac output and hemodynamic stability:   Monitor blood pressure and heart rate   Monitor
ensure adequate hydration     Problem: Infection - Adult  Goal: Absence of infection at discharge  Outcome: Progressing  Flowsheets (Taken 3/31/2023 2240)  Absence of infection at discharge:   Assess and monitor for signs and symptoms of infection   Monitor lab/diagnostic results   Monitor all insertion sites i.e., indwelling lines, tubes and drains     Problem: ABCDS Injury Assessment  Goal: Absence of physical injury  Outcome: Progressing  Flowsheets (Taken 3/31/2023 2240)  Absence of Physical Injury: Implement safety measures based on patient assessment  Note: Sitter at bedside. Patient up with staff assistance. Able to use call light. Patient has remained free of falls during this shift. Appropriate fall prevention measures in place. Problem: Skin/Tissue Integrity  Goal: Absence of new skin breakdown  Description: 1. Monitor for areas of redness and/or skin breakdown  2. Assess vascular access sites hourly  3. Every 4-6 hours minimum:  Change oxygen saturation probe site  4. Every 4-6 hours:  If on nasal continuous positive airway pressure, respiratory therapy assess nares and determine need for appliance change or resting period. Outcome: Progressing  Note: Assess skin every 4 hours     Problem: Nutrition Deficit:  Goal: Optimize nutritional status  Outcome: Progressing  Flowsheets (Taken 3/31/2023 2240)  Nutrient intake appropriate for improving, restoring, or maintaining nutritional needs:   Assess nutritional status and recommend course of action   Monitor oral intake, labs, and treatment plans     Care plan reviewed with patient. Patient verbalized understanding of the plan of care and contribute to goal setting.
patient fatigue and changes in neurological status     Problem: Cardiovascular - Adult  Goal: Maintains optimal cardiac output and hemodynamic stability  4/1/2023 1049 by Qi Tan RN  Outcome: Progressing  Flowsheets (Taken 4/1/2023 1049)  Maintains optimal cardiac output and hemodynamic stability:   Monitor blood pressure and heart rate   Administer fluid and/or volume expanders as ordered   Assess for signs of decreased cardiac output  3/31/2023 2240 by Lore Melara RN  Outcome: Progressing  Flowsheets (Taken 3/31/2023 2240)  Maintains optimal cardiac output and hemodynamic stability:   Monitor blood pressure and heart rate   Assess for signs of decreased cardiac output     Problem: Skin/Tissue Integrity - Adult  Goal: Skin integrity remains intact  4/1/2023 1049 by Qi Tan RN  Outcome: Progressing  Flowsheets (Taken 4/1/2023 1049)  Skin Integrity Remains Intact: Monitor for areas of redness and/or skin breakdown  3/31/2023 2240 by Lore Melara RN  Outcome: Progressing  Flowsheets (Taken 3/31/2023 2240)  Skin Integrity Remains Intact: Monitor for areas of redness and/or skin breakdown  Goal: Incisions, wounds, or drain sites healing without S/S of infection  4/1/2023 1049 by Qi Tan RN  Outcome: Progressing  Flowsheets (Taken 4/1/2023 1049)  Incisions, Wounds, or Drain Sites Healing Without Sign and Symptoms of Infection: ADMISSION and DAILY: Assess and document risk factors for pressure ulcer development  3/31/2023 2240 by Lore Melara RN  Outcome: Progressing  Flowsheets (Taken 3/31/2023 2240)  Incisions, Wounds, or Drain Sites Healing Without Sign and Symptoms of Infection:   ADMISSION and DAILY: Assess and document risk factors for pressure ulcer development   TWICE DAILY: Assess and document skin integrity     Problem: Musculoskeletal - Adult  Goal: Return mobility to safest level of function  4/1/2023 1049 by Qi Tan RN  Outcome: Progressing  Flowsheets (Taken 4/1/2023 1049)  Return 
Assess bowel function   Encourage oral fluids to ensure adequate hydration   Administer ordered medications as needed   Encourage mobilization and activity     Problem: Infection - Adult  Goal: Absence of infection at discharge  3/28/2023 1845 by Morteza Aggarwal RN  Outcome: Progressing  Flowsheets (Taken 3/28/2023 1845)  Absence of infection at discharge:   Assess and monitor for signs and symptoms of infection   Monitor lab/diagnostic results   Monitor all insertion sites i.e., indwelling lines, tubes and drains   Administer medications as ordered   Instruct and encourage patient and family to use good hand hygiene technique     Problem: Infection - Adult  Goal: Absence of infection during hospitalization  3/28/2023 1845 by Morteza Aggarwal RN  Outcome: Progressing  Flowsheets (Taken 3/28/2023 1845)  Absence of infection during hospitalization:   Assess and monitor for signs and symptoms of infection   Monitor lab/diagnostic results   Monitor all insertion sites i.e., indwelling lines, tubes and drains   Administer medications as ordered   Instruct and encourage patient and family to use good hand hygiene technique     Problem: Metabolic/Fluid and Electrolytes - Adult  Goal: Hemodynamic stability and optimal renal function maintained  Outcome: Progressing  Flowsheets (Taken 3/28/2023 1845)  Hemodynamic stability and optimal renal function maintained:   Monitor labs and assess for signs and symptoms of volume excess or deficit   Monitor intake, output and patient weight     Problem: Hematologic - Adult  Goal: Maintains hematologic stability  Outcome: Progressing  Flowsheets (Taken 3/28/2023 1845)  Maintains hematologic stability:   Assess for signs and symptoms of bleeding or hemorrhage   Monitor labs for bleeding or clotting disorders     Problem: Skin/Tissue Integrity  Goal: Absence of new skin breakdown  Description: 1. Monitor for areas of redness and/or skin breakdown  2.   Assess vascular access sites
Progressing  Goal: Return ADL status to a safe level of function  3/26/2023 1725 by Rusty Vale RN  Outcome: Progressing  Flowsheets (Taken 3/26/2023 1600)  Return ADL Status to a Safe Level of Function:   Administer medication as ordered   Assess activities of daily living deficits and provide assistive devices as needed   Obtain physical therapy/occupational therapy consults as needed  3/26/2023 1527 by Vitaliy Gray RN  Outcome: Not Progressing     Problem: Gastrointestinal - Adult  Goal: Maintains or returns to baseline bowel function  Outcome: Progressing  Flowsheets (Taken 3/26/2023 1600)  Maintains or returns to baseline bowel function:   Assess bowel function   Encourage oral fluids to ensure adequate hydration   Administer IV fluids as ordered to ensure adequate hydration     Problem: Genitourinary - Adult  Goal: Urinary catheter remains patent  3/26/2023 1725 by Rusty Vale RN  Outcome: Progressing  Flowsheets (Taken 3/26/2023 1600)  Urinary catheter remains patent:   Assess patency of urinary catheter   Irrigate catheter per Licensed Independent Practitioner order if indicated and notify Licensed Independent Practitioner if unable to irrigate   Assess need for a larger catheter size or a 3-way catheter for continuous bladder irrigation  3/26/2023 1527 by Vitaliy Gray RN  Outcome: Progressing     Problem: Infection - Adult  Goal: Absence of infection at discharge  Outcome: Progressing  Flowsheets (Taken 3/26/2023 1600)  Absence of infection at discharge:   Assess and monitor for signs and symptoms of infection   Monitor lab/diagnostic results   Monitor all insertion sites i.e., indwelling lines, tubes and drains   Monitor endotracheal (as able) and nasal secretions for changes in amount and color  Goal: Absence of infection during hospitalization  Outcome: Progressing  Flowsheets (Taken 3/26/2023 1600)  Absence of infection during hospitalization:   Assess and monitor for signs and symptoms of
areas of redness and/or skin breakdown  2. Assess vascular access sites hourly  3. Every 4-6 hours minimum:  Change oxygen saturation probe site  4. Every 4-6 hours:  If on nasal continuous positive airway pressure, respiratory therapy assess nares and determine need for appliance change or resting period. 3/31/2023 0036 by Mayur Weinstein RN  Outcome: Progressing  Note: Absence of new skin injuries noted at this time. Care plan reviewed with patient. Patient verbalizes understanding of the plan of care and contributes to goal setting.
Assessment  Goal: Absence of physical injury  3/29/2023 3896 by Madelin Kruger RN  Outcome: Progressing  Flowsheets (Taken 3/29/2023 8062)  Absence of Physical Injury: Implement safety measures based on patient assessment     Problem: Skin/Tissue Integrity  Goal: Absence of new skin breakdown  Description: 1. Monitor for areas of redness and/or skin breakdown  2. Assess vascular access sites hourly  3. Every 4-6 hours minimum:  Change oxygen saturation probe site  4. Every 4-6 hours:  If on nasal continuous positive airway pressure, respiratory therapy assess nares and determine need for appliance change or resting period. 3/29/2023 6818 by Madelin Kruger RN  Outcome: Progressing     Care plan reviewed with patient. Patient verbalizes understanding of plan of care and contributes to goal setting.

## 2023-04-03 PROCEDURE — 97530 THERAPEUTIC ACTIVITIES: CPT

## 2023-04-03 PROCEDURE — 97110 THERAPEUTIC EXERCISES: CPT

## 2023-04-03 PROCEDURE — 97535 SELF CARE MNGMENT TRAINING: CPT

## 2023-04-03 PROCEDURE — 6370000000 HC RX 637 (ALT 250 FOR IP): Performed by: PHYSICIAN ASSISTANT

## 2023-04-03 PROCEDURE — 97129 THER IVNTJ 1ST 15 MIN: CPT

## 2023-04-03 PROCEDURE — 6370000000 HC RX 637 (ALT 250 FOR IP): Performed by: PSYCHIATRY & NEUROLOGY

## 2023-04-03 PROCEDURE — 6370000000 HC RX 637 (ALT 250 FOR IP)

## 2023-04-03 PROCEDURE — 1200000000 HC SEMI PRIVATE

## 2023-04-03 PROCEDURE — 99233 SBSQ HOSP IP/OBS HIGH 50: CPT | Performed by: PHYSICIAN ASSISTANT

## 2023-04-03 PROCEDURE — 6370000000 HC RX 637 (ALT 250 FOR IP): Performed by: NURSE PRACTITIONER

## 2023-04-03 PROCEDURE — 1200000003 HC TELEMETRY R&B

## 2023-04-03 PROCEDURE — 92526 ORAL FUNCTION THERAPY: CPT

## 2023-04-03 RX ORDER — QUETIAPINE FUMARATE 25 MG/1
25 TABLET, FILM COATED ORAL 2 TIMES DAILY
Status: DISCONTINUED | OUTPATIENT
Start: 2023-04-03 | End: 2023-04-13 | Stop reason: HOSPADM

## 2023-04-03 RX ORDER — QUETIAPINE FUMARATE 25 MG/1
25 TABLET, FILM COATED ORAL NIGHTLY
Status: DISCONTINUED | OUTPATIENT
Start: 2023-04-03 | End: 2023-04-03

## 2023-04-03 RX ADMIN — BACITRACIN ZINC NEOMYCIN SULFATE POLYMYXIN B SULFATE: 400; 3.5; 5 OINTMENT TOPICAL at 20:27

## 2023-04-03 RX ADMIN — HYDROXYZINE HYDROCHLORIDE 10 MG: 10 TABLET ORAL at 20:25

## 2023-04-03 RX ADMIN — PREDNISOLONE ACETATE 1 DROP: 10 SUSPENSION/ DROPS OPHTHALMIC at 08:52

## 2023-04-03 RX ADMIN — PREDNISOLONE ACETATE 1 DROP: 10 SUSPENSION/ DROPS OPHTHALMIC at 14:29

## 2023-04-03 RX ADMIN — BACITRACIN ZINC NEOMYCIN SULFATE POLYMYXIN B SULFATE: 400; 3.5; 5 OINTMENT TOPICAL at 08:51

## 2023-04-03 RX ADMIN — MIDODRINE HYDROCHLORIDE 10 MG: 10 TABLET ORAL at 08:49

## 2023-04-03 RX ADMIN — CARBIDOPA AND LEVODOPA 1.5 TABLET: 25; 100 TABLET ORAL at 20:25

## 2023-04-03 RX ADMIN — PANTOPRAZOLE SODIUM 40 MG: 40 TABLET, DELAYED RELEASE ORAL at 05:33

## 2023-04-03 RX ADMIN — CLOPIDOGREL BISULFATE 75 MG: 75 TABLET ORAL at 08:49

## 2023-04-03 RX ADMIN — CEFDINIR 300 MG: 300 CAPSULE ORAL at 08:49

## 2023-04-03 RX ADMIN — BACITRACIN: 500 OINTMENT TOPICAL at 08:50

## 2023-04-03 RX ADMIN — CARBIDOPA AND LEVODOPA 1.5 TABLET: 25; 100 TABLET ORAL at 08:49

## 2023-04-03 RX ADMIN — ACETAMINOPHEN 500 MG: 500 TABLET ORAL at 08:49

## 2023-04-03 RX ADMIN — MIDODRINE HYDROCHLORIDE 10 MG: 10 TABLET ORAL at 14:29

## 2023-04-03 RX ADMIN — ASPIRIN 81 MG: 81 TABLET, COATED ORAL at 08:49

## 2023-04-03 RX ADMIN — ACETAMINOPHEN 500 MG: 500 TABLET ORAL at 20:25

## 2023-04-03 RX ADMIN — Medication 1 TABLET: at 08:49

## 2023-04-03 RX ADMIN — FERROUS SULFATE TAB 325 MG (65 MG ELEMENTAL FE) 325 MG: 325 (65 FE) TAB at 08:49

## 2023-04-03 RX ADMIN — CEFDINIR 300 MG: 300 CAPSULE ORAL at 20:25

## 2023-04-03 RX ADMIN — ATORVASTATIN CALCIUM 40 MG: 40 TABLET, FILM COATED ORAL at 20:25

## 2023-04-03 RX ADMIN — QUETIAPINE FUMARATE 25 MG: 25 TABLET ORAL at 21:31

## 2023-04-03 RX ADMIN — CARBIDOPA AND LEVODOPA 1.5 TABLET: 25; 100 TABLET ORAL at 14:29

## 2023-04-03 RX ADMIN — BACITRACIN: 500 OINTMENT TOPICAL at 14:28

## 2023-04-03 RX ADMIN — BACITRACIN: 500 OINTMENT TOPICAL at 20:26

## 2023-04-03 RX ADMIN — FERROUS SULFATE TAB 325 MG (65 MG ELEMENTAL FE) 325 MG: 325 (65 FE) TAB at 16:31

## 2023-04-03 RX ADMIN — PREDNISOLONE ACETATE 1 DROP: 10 SUSPENSION/ DROPS OPHTHALMIC at 20:28

## 2023-04-03 ASSESSMENT — PAIN SCALES - GENERAL
PAINLEVEL_OUTOF10: 0
PAINLEVEL_OUTOF10: 3

## 2023-04-03 ASSESSMENT — PAIN DESCRIPTION - LOCATION: LOCATION: BUTTOCKS

## 2023-04-03 ASSESSMENT — PAIN DESCRIPTION - DESCRIPTORS: DESCRIPTORS: SORE

## 2023-04-04 LAB
ANION GAP SERPL CALC-SCNC: 8 MEQ/L (ref 8–16)
BUN SERPL-MCNC: 15 MG/DL (ref 7–22)
CALCIUM SERPL-MCNC: 8.5 MG/DL (ref 8.5–10.5)
CHLORIDE SERPL-SCNC: 106 MEQ/L (ref 98–111)
CO2 SERPL-SCNC: 29 MEQ/L (ref 23–33)
CREAT SERPL-MCNC: 0.6 MG/DL (ref 0.4–1.2)
DEPRECATED RDW RBC AUTO: 55.3 FL (ref 35–45)
ERYTHROCYTE [DISTWIDTH] IN BLOOD BY AUTOMATED COUNT: 15.7 % (ref 11.5–14.5)
GFR SERPL CREATININE-BSD FRML MDRD: > 60 ML/MIN/1.73M2
GLUCOSE SERPL-MCNC: 95 MG/DL (ref 70–108)
HCT VFR BLD AUTO: 30.4 % (ref 37–47)
HGB BLD-MCNC: 9.4 GM/DL (ref 12–16)
MCH RBC QN AUTO: 29.7 PG (ref 26–33)
MCHC RBC AUTO-ENTMCNC: 30.9 GM/DL (ref 32.2–35.5)
MCV RBC AUTO: 96.2 FL (ref 81–99)
PLATELET # BLD AUTO: 166 THOU/MM3 (ref 130–400)
PMV BLD AUTO: 10.1 FL (ref 9.4–12.4)
POTASSIUM SERPL-SCNC: 3.5 MEQ/L (ref 3.5–5.2)
RBC # BLD AUTO: 3.16 MILL/MM3 (ref 4.2–5.4)
SODIUM SERPL-SCNC: 143 MEQ/L (ref 135–145)
WBC # BLD AUTO: 6.7 THOU/MM3 (ref 4.8–10.8)

## 2023-04-04 PROCEDURE — 36415 COLL VENOUS BLD VENIPUNCTURE: CPT

## 2023-04-04 PROCEDURE — 97129 THER IVNTJ 1ST 15 MIN: CPT

## 2023-04-04 PROCEDURE — 6370000000 HC RX 637 (ALT 250 FOR IP): Performed by: PSYCHIATRY & NEUROLOGY

## 2023-04-04 PROCEDURE — 99232 SBSQ HOSP IP/OBS MODERATE 35: CPT | Performed by: PHYSICIAN ASSISTANT

## 2023-04-04 PROCEDURE — 6370000000 HC RX 637 (ALT 250 FOR IP): Performed by: PHYSICIAN ASSISTANT

## 2023-04-04 PROCEDURE — 80048 BASIC METABOLIC PNL TOTAL CA: CPT

## 2023-04-04 PROCEDURE — 6370000000 HC RX 637 (ALT 250 FOR IP): Performed by: NURSE PRACTITIONER

## 2023-04-04 PROCEDURE — 97130 THER IVNTJ EA ADDL 15 MIN: CPT

## 2023-04-04 PROCEDURE — 97535 SELF CARE MNGMENT TRAINING: CPT

## 2023-04-04 PROCEDURE — 92526 ORAL FUNCTION THERAPY: CPT

## 2023-04-04 PROCEDURE — 97110 THERAPEUTIC EXERCISES: CPT

## 2023-04-04 PROCEDURE — 1200000003 HC TELEMETRY R&B

## 2023-04-04 PROCEDURE — 85027 COMPLETE CBC AUTOMATED: CPT

## 2023-04-04 PROCEDURE — 6370000000 HC RX 637 (ALT 250 FOR IP)

## 2023-04-04 PROCEDURE — 1200000000 HC SEMI PRIVATE

## 2023-04-04 PROCEDURE — 97530 THERAPEUTIC ACTIVITIES: CPT

## 2023-04-04 RX ADMIN — PREDNISOLONE ACETATE 1 DROP: 10 SUSPENSION/ DROPS OPHTHALMIC at 09:27

## 2023-04-04 RX ADMIN — ACETAMINOPHEN 500 MG: 500 TABLET ORAL at 09:23

## 2023-04-04 RX ADMIN — BACITRACIN ZINC NEOMYCIN SULFATE POLYMYXIN B SULFATE: 400; 3.5; 5 OINTMENT TOPICAL at 21:37

## 2023-04-04 RX ADMIN — ATORVASTATIN CALCIUM 40 MG: 40 TABLET, FILM COATED ORAL at 21:33

## 2023-04-04 RX ADMIN — PREDNISOLONE ACETATE 1 DROP: 10 SUSPENSION/ DROPS OPHTHALMIC at 21:30

## 2023-04-04 RX ADMIN — MIDODRINE HYDROCHLORIDE 10 MG: 10 TABLET ORAL at 13:58

## 2023-04-04 RX ADMIN — CARBIDOPA AND LEVODOPA 1.5 TABLET: 25; 100 TABLET ORAL at 09:26

## 2023-04-04 RX ADMIN — CARBIDOPA AND LEVODOPA 1.5 TABLET: 25; 100 TABLET ORAL at 21:30

## 2023-04-04 RX ADMIN — ACETAMINOPHEN 500 MG: 500 TABLET ORAL at 21:32

## 2023-04-04 RX ADMIN — CLOPIDOGREL BISULFATE 75 MG: 75 TABLET ORAL at 09:23

## 2023-04-04 RX ADMIN — FERROUS SULFATE TAB 325 MG (65 MG ELEMENTAL FE) 325 MG: 325 (65 FE) TAB at 09:25

## 2023-04-04 RX ADMIN — CARBIDOPA AND LEVODOPA 1.5 TABLET: 25; 100 TABLET ORAL at 13:57

## 2023-04-04 RX ADMIN — CEFDINIR 300 MG: 300 CAPSULE ORAL at 09:25

## 2023-04-04 RX ADMIN — PREDNISOLONE ACETATE 1 DROP: 10 SUSPENSION/ DROPS OPHTHALMIC at 13:56

## 2023-04-04 RX ADMIN — BACITRACIN ZINC NEOMYCIN SULFATE POLYMYXIN B SULFATE: 400; 3.5; 5 OINTMENT TOPICAL at 09:24

## 2023-04-04 RX ADMIN — FERROUS SULFATE TAB 325 MG (65 MG ELEMENTAL FE) 325 MG: 325 (65 FE) TAB at 17:06

## 2023-04-04 RX ADMIN — BACITRACIN: 500 OINTMENT TOPICAL at 21:33

## 2023-04-04 RX ADMIN — ASPIRIN 81 MG: 81 TABLET, COATED ORAL at 09:23

## 2023-04-04 RX ADMIN — PREDNISOLONE ACETATE 1 DROP: 10 SUSPENSION/ DROPS OPHTHALMIC at 17:06

## 2023-04-04 RX ADMIN — PANTOPRAZOLE SODIUM 40 MG: 40 TABLET, DELAYED RELEASE ORAL at 06:20

## 2023-04-04 RX ADMIN — BACITRACIN: 500 OINTMENT TOPICAL at 09:24

## 2023-04-04 RX ADMIN — BACITRACIN: 500 OINTMENT TOPICAL at 13:57

## 2023-04-04 RX ADMIN — CEFDINIR 300 MG: 300 CAPSULE ORAL at 21:34

## 2023-04-04 RX ADMIN — Medication 1 TABLET: at 09:26

## 2023-04-04 RX ADMIN — QUETIAPINE FUMARATE 25 MG: 25 TABLET ORAL at 09:26

## 2023-04-04 RX ADMIN — QUETIAPINE FUMARATE 25 MG: 25 TABLET ORAL at 21:30

## 2023-04-04 ASSESSMENT — PAIN SCALES - GENERAL
PAINLEVEL_OUTOF10: 0
PAINLEVEL_OUTOF10: 0

## 2023-04-05 LAB
ANION GAP SERPL CALC-SCNC: 7 MEQ/L (ref 8–16)
BUN SERPL-MCNC: 18 MG/DL (ref 7–22)
CALCIUM SERPL-MCNC: 8.6 MG/DL (ref 8.5–10.5)
CHLORIDE SERPL-SCNC: 107 MEQ/L (ref 98–111)
CO2 SERPL-SCNC: 29 MEQ/L (ref 23–33)
CREAT SERPL-MCNC: 0.6 MG/DL (ref 0.4–1.2)
GFR SERPL CREATININE-BSD FRML MDRD: > 60 ML/MIN/1.73M2
GLUCOSE SERPL-MCNC: 83 MG/DL (ref 70–108)
POTASSIUM SERPL-SCNC: 3.4 MEQ/L (ref 3.5–5.2)
SODIUM SERPL-SCNC: 143 MEQ/L (ref 135–145)

## 2023-04-05 PROCEDURE — 97110 THERAPEUTIC EXERCISES: CPT

## 2023-04-05 PROCEDURE — 6370000000 HC RX 637 (ALT 250 FOR IP): Performed by: NURSE PRACTITIONER

## 2023-04-05 PROCEDURE — 80048 BASIC METABOLIC PNL TOTAL CA: CPT

## 2023-04-05 PROCEDURE — 6370000000 HC RX 637 (ALT 250 FOR IP): Performed by: PHYSICIAN ASSISTANT

## 2023-04-05 PROCEDURE — 2580000003 HC RX 258: Performed by: PHYSICIAN ASSISTANT

## 2023-04-05 PROCEDURE — 97530 THERAPEUTIC ACTIVITIES: CPT

## 2023-04-05 PROCEDURE — 36415 COLL VENOUS BLD VENIPUNCTURE: CPT

## 2023-04-05 PROCEDURE — 1200000000 HC SEMI PRIVATE

## 2023-04-05 PROCEDURE — 6370000000 HC RX 637 (ALT 250 FOR IP): Performed by: PSYCHIATRY & NEUROLOGY

## 2023-04-05 PROCEDURE — 99232 SBSQ HOSP IP/OBS MODERATE 35: CPT | Performed by: PHYSICIAN ASSISTANT

## 2023-04-05 PROCEDURE — 6370000000 HC RX 637 (ALT 250 FOR IP)

## 2023-04-05 PROCEDURE — 1200000003 HC TELEMETRY R&B

## 2023-04-05 RX ORDER — SODIUM CHLORIDE, SODIUM LACTATE, POTASSIUM CHLORIDE, CALCIUM CHLORIDE 600; 310; 30; 20 MG/100ML; MG/100ML; MG/100ML; MG/100ML
INJECTION, SOLUTION INTRAVENOUS CONTINUOUS
Status: DISCONTINUED | OUTPATIENT
Start: 2023-04-05 | End: 2023-04-09

## 2023-04-05 RX ADMIN — PREDNISOLONE ACETATE 1 DROP: 10 SUSPENSION/ DROPS OPHTHALMIC at 16:58

## 2023-04-05 RX ADMIN — ACETAMINOPHEN 500 MG: 500 TABLET ORAL at 20:55

## 2023-04-05 RX ADMIN — MIDODRINE HYDROCHLORIDE 10 MG: 10 TABLET ORAL at 13:23

## 2023-04-05 RX ADMIN — PREDNISOLONE ACETATE 1 DROP: 10 SUSPENSION/ DROPS OPHTHALMIC at 13:23

## 2023-04-05 RX ADMIN — CLOPIDOGREL BISULFATE 75 MG: 75 TABLET ORAL at 08:31

## 2023-04-05 RX ADMIN — PANTOPRAZOLE SODIUM 40 MG: 40 TABLET, DELAYED RELEASE ORAL at 05:27

## 2023-04-05 RX ADMIN — CARBIDOPA AND LEVODOPA 1.5 TABLET: 25; 100 TABLET ORAL at 13:23

## 2023-04-05 RX ADMIN — PREDNISOLONE ACETATE 1 DROP: 10 SUSPENSION/ DROPS OPHTHALMIC at 08:31

## 2023-04-05 RX ADMIN — ATORVASTATIN CALCIUM 40 MG: 40 TABLET, FILM COATED ORAL at 20:54

## 2023-04-05 RX ADMIN — ASPIRIN 81 MG: 81 TABLET, COATED ORAL at 08:30

## 2023-04-05 RX ADMIN — FERROUS SULFATE TAB 325 MG (65 MG ELEMENTAL FE) 325 MG: 325 (65 FE) TAB at 08:30

## 2023-04-05 RX ADMIN — QUETIAPINE FUMARATE 25 MG: 25 TABLET ORAL at 20:54

## 2023-04-05 RX ADMIN — ACETAMINOPHEN 500 MG: 500 TABLET ORAL at 08:30

## 2023-04-05 RX ADMIN — BACITRACIN ZINC NEOMYCIN SULFATE POLYMYXIN B SULFATE: 400; 3.5; 5 OINTMENT TOPICAL at 08:31

## 2023-04-05 RX ADMIN — CARBIDOPA AND LEVODOPA 1.5 TABLET: 25; 100 TABLET ORAL at 08:30

## 2023-04-05 RX ADMIN — POTASSIUM CHLORIDE 40 MEQ: 1500 TABLET, EXTENDED RELEASE ORAL at 20:54

## 2023-04-05 RX ADMIN — MIDODRINE HYDROCHLORIDE 10 MG: 10 TABLET ORAL at 08:30

## 2023-04-05 RX ADMIN — BACITRACIN: 500 OINTMENT TOPICAL at 13:23

## 2023-04-05 RX ADMIN — SODIUM CHLORIDE, POTASSIUM CHLORIDE, SODIUM LACTATE AND CALCIUM CHLORIDE: 600; 310; 30; 20 INJECTION, SOLUTION INTRAVENOUS at 14:09

## 2023-04-05 RX ADMIN — Medication 1 TABLET: at 08:30

## 2023-04-05 RX ADMIN — FERROUS SULFATE TAB 325 MG (65 MG ELEMENTAL FE) 325 MG: 325 (65 FE) TAB at 16:58

## 2023-04-05 RX ADMIN — BACITRACIN ZINC NEOMYCIN SULFATE POLYMYXIN B SULFATE: 400; 3.5; 5 OINTMENT TOPICAL at 20:55

## 2023-04-05 RX ADMIN — PREDNISOLONE ACETATE 1 DROP: 10 SUSPENSION/ DROPS OPHTHALMIC at 20:55

## 2023-04-05 RX ADMIN — BACITRACIN: 500 OINTMENT TOPICAL at 08:31

## 2023-04-05 RX ADMIN — QUETIAPINE FUMARATE 25 MG: 25 TABLET ORAL at 08:31

## 2023-04-05 RX ADMIN — CARBIDOPA AND LEVODOPA 1.5 TABLET: 25; 100 TABLET ORAL at 20:54

## 2023-04-05 RX ADMIN — BACITRACIN: 500 OINTMENT TOPICAL at 20:55

## 2023-04-05 ASSESSMENT — PAIN SCALES - GENERAL: PAINLEVEL_OUTOF10: 0

## 2023-04-06 PROCEDURE — 97530 THERAPEUTIC ACTIVITIES: CPT

## 2023-04-06 PROCEDURE — 6370000000 HC RX 637 (ALT 250 FOR IP): Performed by: PHYSICIAN ASSISTANT

## 2023-04-06 PROCEDURE — 6370000000 HC RX 637 (ALT 250 FOR IP): Performed by: PSYCHIATRY & NEUROLOGY

## 2023-04-06 PROCEDURE — 1200000003 HC TELEMETRY R&B

## 2023-04-06 PROCEDURE — 97110 THERAPEUTIC EXERCISES: CPT

## 2023-04-06 PROCEDURE — 6370000000 HC RX 637 (ALT 250 FOR IP): Performed by: NURSE PRACTITIONER

## 2023-04-06 PROCEDURE — 6370000000 HC RX 637 (ALT 250 FOR IP)

## 2023-04-06 PROCEDURE — 97535 SELF CARE MNGMENT TRAINING: CPT

## 2023-04-06 PROCEDURE — 2580000003 HC RX 258: Performed by: PHYSICIAN ASSISTANT

## 2023-04-06 PROCEDURE — 99232 SBSQ HOSP IP/OBS MODERATE 35: CPT | Performed by: PHYSICIAN ASSISTANT

## 2023-04-06 PROCEDURE — 1200000000 HC SEMI PRIVATE

## 2023-04-06 RX ADMIN — QUETIAPINE FUMARATE 25 MG: 25 TABLET ORAL at 09:34

## 2023-04-06 RX ADMIN — BACITRACIN ZINC NEOMYCIN SULFATE POLYMYXIN B SULFATE: 400; 3.5; 5 OINTMENT TOPICAL at 20:30

## 2023-04-06 RX ADMIN — SODIUM CHLORIDE, POTASSIUM CHLORIDE, SODIUM LACTATE AND CALCIUM CHLORIDE 975 ML: 600; 310; 30; 20 INJECTION, SOLUTION INTRAVENOUS at 17:37

## 2023-04-06 RX ADMIN — BACITRACIN: 500 OINTMENT TOPICAL at 20:28

## 2023-04-06 RX ADMIN — CARBIDOPA AND LEVODOPA 1.5 TABLET: 25; 100 TABLET ORAL at 15:09

## 2023-04-06 RX ADMIN — ACETAMINOPHEN 500 MG: 500 TABLET ORAL at 20:32

## 2023-04-06 RX ADMIN — BACITRACIN: 500 OINTMENT TOPICAL at 09:35

## 2023-04-06 RX ADMIN — PREDNISOLONE ACETATE 1 DROP: 10 SUSPENSION/ DROPS OPHTHALMIC at 20:28

## 2023-04-06 RX ADMIN — ASPIRIN 81 MG: 81 TABLET, COATED ORAL at 09:33

## 2023-04-06 RX ADMIN — CARBIDOPA AND LEVODOPA 1.5 TABLET: 25; 100 TABLET ORAL at 09:33

## 2023-04-06 RX ADMIN — MIDODRINE HYDROCHLORIDE 10 MG: 10 TABLET ORAL at 17:35

## 2023-04-06 RX ADMIN — BACITRACIN ZINC NEOMYCIN SULFATE POLYMYXIN B SULFATE: 400; 3.5; 5 OINTMENT TOPICAL at 09:35

## 2023-04-06 RX ADMIN — CLOPIDOGREL BISULFATE 75 MG: 75 TABLET ORAL at 09:34

## 2023-04-06 RX ADMIN — PREDNISOLONE ACETATE 1 DROP: 10 SUSPENSION/ DROPS OPHTHALMIC at 15:03

## 2023-04-06 RX ADMIN — PREDNISOLONE ACETATE 1 DROP: 10 SUSPENSION/ DROPS OPHTHALMIC at 09:35

## 2023-04-06 RX ADMIN — PANTOPRAZOLE SODIUM 40 MG: 40 TABLET, DELAYED RELEASE ORAL at 07:01

## 2023-04-06 RX ADMIN — MIDODRINE HYDROCHLORIDE 10 MG: 10 TABLET ORAL at 12:15

## 2023-04-06 RX ADMIN — QUETIAPINE FUMARATE 25 MG: 25 TABLET ORAL at 20:29

## 2023-04-06 RX ADMIN — Medication 1 TABLET: at 09:34

## 2023-04-06 RX ADMIN — FERROUS SULFATE TAB 325 MG (65 MG ELEMENTAL FE) 325 MG: 325 (65 FE) TAB at 17:35

## 2023-04-06 RX ADMIN — ATORVASTATIN CALCIUM 40 MG: 40 TABLET, FILM COATED ORAL at 20:29

## 2023-04-06 RX ADMIN — BACITRACIN: 500 OINTMENT TOPICAL at 15:04

## 2023-04-06 RX ADMIN — ACETAMINOPHEN 500 MG: 500 TABLET ORAL at 09:34

## 2023-04-06 RX ADMIN — CARBIDOPA AND LEVODOPA 1.5 TABLET: 25; 100 TABLET ORAL at 20:29

## 2023-04-06 RX ADMIN — FERROUS SULFATE TAB 325 MG (65 MG ELEMENTAL FE) 325 MG: 325 (65 FE) TAB at 09:34

## 2023-04-06 ASSESSMENT — PAIN SCALES - GENERAL
PAINLEVEL_OUTOF10: 0
PAINLEVEL_OUTOF10: 0

## 2023-04-07 LAB
ANION GAP SERPL CALC-SCNC: 5 MEQ/L (ref 8–16)
BUN SERPL-MCNC: 20 MG/DL (ref 7–22)
CALCIUM SERPL-MCNC: 8.2 MG/DL (ref 8.5–10.5)
CHLORIDE SERPL-SCNC: 107 MEQ/L (ref 98–111)
CO2 SERPL-SCNC: 29 MEQ/L (ref 23–33)
CREAT SERPL-MCNC: 0.6 MG/DL (ref 0.4–1.2)
DEPRECATED RDW RBC AUTO: 56.2 FL (ref 35–45)
ERYTHROCYTE [DISTWIDTH] IN BLOOD BY AUTOMATED COUNT: 15.9 % (ref 11.5–14.5)
GFR SERPL CREATININE-BSD FRML MDRD: > 60 ML/MIN/1.73M2
GLUCOSE SERPL-MCNC: 90 MG/DL (ref 70–108)
HCT VFR BLD AUTO: 28 % (ref 37–47)
HGB BLD-MCNC: 8.7 GM/DL (ref 12–16)
MCH RBC QN AUTO: 30 PG (ref 26–33)
MCHC RBC AUTO-ENTMCNC: 31.1 GM/DL (ref 32.2–35.5)
MCV RBC AUTO: 96.6 FL (ref 81–99)
PLATELET # BLD AUTO: 159 THOU/MM3 (ref 130–400)
PMV BLD AUTO: 10.3 FL (ref 9.4–12.4)
POTASSIUM SERPL-SCNC: 3.7 MEQ/L (ref 3.5–5.2)
RBC # BLD AUTO: 2.9 MILL/MM3 (ref 4.2–5.4)
SODIUM SERPL-SCNC: 141 MEQ/L (ref 135–145)
WBC # BLD AUTO: 7.4 THOU/MM3 (ref 4.8–10.8)

## 2023-04-07 PROCEDURE — 6370000000 HC RX 637 (ALT 250 FOR IP): Performed by: PHYSICIAN ASSISTANT

## 2023-04-07 PROCEDURE — 97535 SELF CARE MNGMENT TRAINING: CPT

## 2023-04-07 PROCEDURE — 1200000003 HC TELEMETRY R&B

## 2023-04-07 PROCEDURE — 6370000000 HC RX 637 (ALT 250 FOR IP): Performed by: PSYCHIATRY & NEUROLOGY

## 2023-04-07 PROCEDURE — 97530 THERAPEUTIC ACTIVITIES: CPT

## 2023-04-07 PROCEDURE — 6370000000 HC RX 637 (ALT 250 FOR IP)

## 2023-04-07 PROCEDURE — 6370000000 HC RX 637 (ALT 250 FOR IP): Performed by: NURSE PRACTITIONER

## 2023-04-07 PROCEDURE — 97110 THERAPEUTIC EXERCISES: CPT

## 2023-04-07 PROCEDURE — 1200000000 HC SEMI PRIVATE

## 2023-04-07 PROCEDURE — 99232 SBSQ HOSP IP/OBS MODERATE 35: CPT | Performed by: PHYSICIAN ASSISTANT

## 2023-04-07 PROCEDURE — 80048 BASIC METABOLIC PNL TOTAL CA: CPT

## 2023-04-07 PROCEDURE — 85027 COMPLETE CBC AUTOMATED: CPT

## 2023-04-07 PROCEDURE — 36415 COLL VENOUS BLD VENIPUNCTURE: CPT

## 2023-04-07 PROCEDURE — 2580000003 HC RX 258: Performed by: PHYSICIAN ASSISTANT

## 2023-04-07 RX ORDER — LANOLIN ALCOHOL/MO/W.PET/CERES
3 CREAM (GRAM) TOPICAL NIGHTLY
Status: DISCONTINUED | OUTPATIENT
Start: 2023-04-07 | End: 2023-04-13 | Stop reason: HOSPADM

## 2023-04-07 RX ORDER — QUETIAPINE FUMARATE 25 MG/1
25 TABLET, FILM COATED ORAL NIGHTLY
Status: DISCONTINUED | OUTPATIENT
Start: 2023-04-07 | End: 2023-04-13 | Stop reason: HOSPADM

## 2023-04-07 RX ADMIN — PREDNISOLONE ACETATE 1 DROP: 10 SUSPENSION/ DROPS OPHTHALMIC at 15:18

## 2023-04-07 RX ADMIN — CLOPIDOGREL BISULFATE 75 MG: 75 TABLET ORAL at 11:36

## 2023-04-07 RX ADMIN — BACITRACIN: 500 OINTMENT TOPICAL at 11:37

## 2023-04-07 RX ADMIN — QUETIAPINE FUMARATE 25 MG: 25 TABLET ORAL at 11:37

## 2023-04-07 RX ADMIN — SODIUM CHLORIDE, POTASSIUM CHLORIDE, SODIUM LACTATE AND CALCIUM CHLORIDE: 600; 310; 30; 20 INJECTION, SOLUTION INTRAVENOUS at 06:40

## 2023-04-07 RX ADMIN — ACETAMINOPHEN 500 MG: 500 TABLET ORAL at 22:10

## 2023-04-07 RX ADMIN — ACETAMINOPHEN 500 MG: 500 TABLET ORAL at 11:36

## 2023-04-07 RX ADMIN — PREDNISOLONE ACETATE 1 DROP: 10 SUSPENSION/ DROPS OPHTHALMIC at 11:39

## 2023-04-07 RX ADMIN — QUETIAPINE FUMARATE 25 MG: 25 TABLET ORAL at 22:09

## 2023-04-07 RX ADMIN — PREDNISOLONE ACETATE 1 DROP: 10 SUSPENSION/ DROPS OPHTHALMIC at 13:00

## 2023-04-07 RX ADMIN — Medication 1 TABLET: at 11:36

## 2023-04-07 RX ADMIN — FERROUS SULFATE TAB 325 MG (65 MG ELEMENTAL FE) 325 MG: 325 (65 FE) TAB at 15:17

## 2023-04-07 RX ADMIN — BACITRACIN ZINC NEOMYCIN SULFATE POLYMYXIN B SULFATE: 400; 3.5; 5 OINTMENT TOPICAL at 22:10

## 2023-04-07 RX ADMIN — BACITRACIN: 500 OINTMENT TOPICAL at 22:11

## 2023-04-07 RX ADMIN — CARBIDOPA AND LEVODOPA 1.5 TABLET: 25; 100 TABLET ORAL at 11:36

## 2023-04-07 RX ADMIN — PREDNISOLONE ACETATE 1 DROP: 10 SUSPENSION/ DROPS OPHTHALMIC at 22:10

## 2023-04-07 RX ADMIN — BACITRACIN: 500 OINTMENT TOPICAL at 15:17

## 2023-04-07 RX ADMIN — SODIUM CHLORIDE, POTASSIUM CHLORIDE, SODIUM LACTATE AND CALCIUM CHLORIDE: 600; 310; 30; 20 INJECTION, SOLUTION INTRAVENOUS at 22:07

## 2023-04-07 RX ADMIN — CARBIDOPA AND LEVODOPA 1.5 TABLET: 25; 100 TABLET ORAL at 22:09

## 2023-04-07 RX ADMIN — PANTOPRAZOLE SODIUM 40 MG: 40 TABLET, DELAYED RELEASE ORAL at 05:33

## 2023-04-07 RX ADMIN — HYDROXYZINE HYDROCHLORIDE 10 MG: 10 TABLET ORAL at 22:09

## 2023-04-07 RX ADMIN — QUETIAPINE FUMARATE 25 MG: 25 TABLET ORAL at 22:11

## 2023-04-07 RX ADMIN — ASPIRIN 81 MG: 81 TABLET, COATED ORAL at 11:36

## 2023-04-07 RX ADMIN — MIDODRINE HYDROCHLORIDE 10 MG: 10 TABLET ORAL at 15:17

## 2023-04-07 RX ADMIN — Medication 3 MG: at 22:10

## 2023-04-07 RX ADMIN — BACITRACIN ZINC NEOMYCIN SULFATE POLYMYXIN B SULFATE: 400; 3.5; 5 OINTMENT TOPICAL at 11:38

## 2023-04-07 RX ADMIN — CARBIDOPA AND LEVODOPA 1.5 TABLET: 25; 100 TABLET ORAL at 15:18

## 2023-04-07 RX ADMIN — ATORVASTATIN CALCIUM 40 MG: 40 TABLET, FILM COATED ORAL at 22:10

## 2023-04-07 RX ADMIN — FERROUS SULFATE TAB 325 MG (65 MG ELEMENTAL FE) 325 MG: 325 (65 FE) TAB at 11:36

## 2023-04-08 PROCEDURE — 6370000000 HC RX 637 (ALT 250 FOR IP): Performed by: NURSE PRACTITIONER

## 2023-04-08 PROCEDURE — 6370000000 HC RX 637 (ALT 250 FOR IP): Performed by: PHYSICIAN ASSISTANT

## 2023-04-08 PROCEDURE — 1200000003 HC TELEMETRY R&B

## 2023-04-08 PROCEDURE — 6370000000 HC RX 637 (ALT 250 FOR IP)

## 2023-04-08 PROCEDURE — 2580000003 HC RX 258: Performed by: PHYSICIAN ASSISTANT

## 2023-04-08 PROCEDURE — 6370000000 HC RX 637 (ALT 250 FOR IP): Performed by: PSYCHIATRY & NEUROLOGY

## 2023-04-08 PROCEDURE — 99232 SBSQ HOSP IP/OBS MODERATE 35: CPT | Performed by: PHYSICIAN ASSISTANT

## 2023-04-08 PROCEDURE — 1200000000 HC SEMI PRIVATE

## 2023-04-08 RX ADMIN — SODIUM CHLORIDE, POTASSIUM CHLORIDE, SODIUM LACTATE AND CALCIUM CHLORIDE: 600; 310; 30; 20 INJECTION, SOLUTION INTRAVENOUS at 11:37

## 2023-04-08 RX ADMIN — ASPIRIN 81 MG: 81 TABLET, COATED ORAL at 08:54

## 2023-04-08 RX ADMIN — PREDNISOLONE ACETATE 1 DROP: 10 SUSPENSION/ DROPS OPHTHALMIC at 21:15

## 2023-04-08 RX ADMIN — CARBIDOPA AND LEVODOPA 1.5 TABLET: 25; 100 TABLET ORAL at 13:58

## 2023-04-08 RX ADMIN — BACITRACIN: 500 OINTMENT TOPICAL at 21:15

## 2023-04-08 RX ADMIN — ATORVASTATIN CALCIUM 40 MG: 40 TABLET, FILM COATED ORAL at 21:16

## 2023-04-08 RX ADMIN — CARBIDOPA AND LEVODOPA 1.5 TABLET: 25; 100 TABLET ORAL at 08:54

## 2023-04-08 RX ADMIN — PANTOPRAZOLE SODIUM 40 MG: 40 TABLET, DELAYED RELEASE ORAL at 06:41

## 2023-04-08 RX ADMIN — PREDNISOLONE ACETATE 1 DROP: 10 SUSPENSION/ DROPS OPHTHALMIC at 15:58

## 2023-04-08 RX ADMIN — QUETIAPINE FUMARATE 25 MG: 25 TABLET ORAL at 08:54

## 2023-04-08 RX ADMIN — ACETAMINOPHEN 500 MG: 500 TABLET ORAL at 08:54

## 2023-04-08 RX ADMIN — BACITRACIN ZINC NEOMYCIN SULFATE POLYMYXIN B SULFATE: 400; 3.5; 5 OINTMENT TOPICAL at 08:45

## 2023-04-08 RX ADMIN — Medication 1 TABLET: at 08:54

## 2023-04-08 RX ADMIN — CLOPIDOGREL BISULFATE 75 MG: 75 TABLET ORAL at 08:54

## 2023-04-08 RX ADMIN — QUETIAPINE FUMARATE 25 MG: 25 TABLET ORAL at 21:17

## 2023-04-08 RX ADMIN — QUETIAPINE FUMARATE 25 MG: 25 TABLET ORAL at 21:16

## 2023-04-08 RX ADMIN — Medication 3 MG: at 21:19

## 2023-04-08 RX ADMIN — BACITRACIN: 500 OINTMENT TOPICAL at 13:59

## 2023-04-08 RX ADMIN — MIDODRINE HYDROCHLORIDE 10 MG: 10 TABLET ORAL at 08:54

## 2023-04-08 RX ADMIN — BACITRACIN ZINC NEOMYCIN SULFATE POLYMYXIN B SULFATE: 400; 3.5; 5 OINTMENT TOPICAL at 21:15

## 2023-04-08 RX ADMIN — BACITRACIN: 500 OINTMENT TOPICAL at 08:45

## 2023-04-08 RX ADMIN — FERROUS SULFATE TAB 325 MG (65 MG ELEMENTAL FE) 325 MG: 325 (65 FE) TAB at 08:54

## 2023-04-08 RX ADMIN — MIDODRINE HYDROCHLORIDE 10 MG: 10 TABLET ORAL at 11:38

## 2023-04-08 RX ADMIN — CARBIDOPA AND LEVODOPA 1.5 TABLET: 25; 100 TABLET ORAL at 21:16

## 2023-04-08 RX ADMIN — PREDNISOLONE ACETATE 1 DROP: 10 SUSPENSION/ DROPS OPHTHALMIC at 08:45

## 2023-04-08 RX ADMIN — PREDNISOLONE ACETATE 1 DROP: 10 SUSPENSION/ DROPS OPHTHALMIC at 11:38

## 2023-04-08 RX ADMIN — FERROUS SULFATE TAB 325 MG (65 MG ELEMENTAL FE) 325 MG: 325 (65 FE) TAB at 15:58

## 2023-04-08 RX ADMIN — ACETAMINOPHEN 500 MG: 500 TABLET ORAL at 21:19

## 2023-04-09 PROCEDURE — 1200000003 HC TELEMETRY R&B

## 2023-04-09 PROCEDURE — 6370000000 HC RX 637 (ALT 250 FOR IP): Performed by: PHYSICIAN ASSISTANT

## 2023-04-09 PROCEDURE — 6370000000 HC RX 637 (ALT 250 FOR IP): Performed by: NURSE PRACTITIONER

## 2023-04-09 PROCEDURE — 1200000000 HC SEMI PRIVATE

## 2023-04-09 PROCEDURE — 6370000000 HC RX 637 (ALT 250 FOR IP): Performed by: PSYCHIATRY & NEUROLOGY

## 2023-04-09 PROCEDURE — 99232 SBSQ HOSP IP/OBS MODERATE 35: CPT | Performed by: PHYSICIAN ASSISTANT

## 2023-04-09 PROCEDURE — 6370000000 HC RX 637 (ALT 250 FOR IP)

## 2023-04-09 PROCEDURE — 2580000003 HC RX 258: Performed by: PHYSICIAN ASSISTANT

## 2023-04-09 RX ADMIN — CARBIDOPA AND LEVODOPA 1.5 TABLET: 25; 100 TABLET ORAL at 21:06

## 2023-04-09 RX ADMIN — BACITRACIN ZINC NEOMYCIN SULFATE POLYMYXIN B SULFATE: 400; 3.5; 5 OINTMENT TOPICAL at 08:12

## 2023-04-09 RX ADMIN — PANTOPRAZOLE SODIUM 40 MG: 40 TABLET, DELAYED RELEASE ORAL at 05:28

## 2023-04-09 RX ADMIN — CARBIDOPA AND LEVODOPA 1.5 TABLET: 25; 100 TABLET ORAL at 08:12

## 2023-04-09 RX ADMIN — FERROUS SULFATE TAB 325 MG (65 MG ELEMENTAL FE) 325 MG: 325 (65 FE) TAB at 16:29

## 2023-04-09 RX ADMIN — CARBIDOPA AND LEVODOPA 1.5 TABLET: 25; 100 TABLET ORAL at 14:47

## 2023-04-09 RX ADMIN — ASPIRIN 81 MG: 81 TABLET, COATED ORAL at 08:12

## 2023-04-09 RX ADMIN — Medication 3 MG: at 21:08

## 2023-04-09 RX ADMIN — FERROUS SULFATE TAB 325 MG (65 MG ELEMENTAL FE) 325 MG: 325 (65 FE) TAB at 08:13

## 2023-04-09 RX ADMIN — QUETIAPINE FUMARATE 25 MG: 25 TABLET ORAL at 08:13

## 2023-04-09 RX ADMIN — ATORVASTATIN CALCIUM 40 MG: 40 TABLET, FILM COATED ORAL at 21:06

## 2023-04-09 RX ADMIN — PREDNISOLONE ACETATE 1 DROP: 10 SUSPENSION/ DROPS OPHTHALMIC at 08:11

## 2023-04-09 RX ADMIN — MIDODRINE HYDROCHLORIDE 15 MG: 5 TABLET ORAL at 08:12

## 2023-04-09 RX ADMIN — SODIUM CHLORIDE, POTASSIUM CHLORIDE, SODIUM LACTATE AND CALCIUM CHLORIDE: 600; 310; 30; 20 INJECTION, SOLUTION INTRAVENOUS at 01:02

## 2023-04-09 RX ADMIN — ACETAMINOPHEN 500 MG: 500 TABLET ORAL at 21:08

## 2023-04-09 RX ADMIN — Medication 1 TABLET: at 08:14

## 2023-04-09 RX ADMIN — CLOPIDOGREL BISULFATE 75 MG: 75 TABLET ORAL at 08:12

## 2023-04-09 RX ADMIN — ACETAMINOPHEN 500 MG: 500 TABLET ORAL at 08:12

## 2023-04-09 RX ADMIN — QUETIAPINE FUMARATE 25 MG: 25 TABLET ORAL at 21:06

## 2023-04-09 RX ADMIN — MIDODRINE HYDROCHLORIDE 15 MG: 5 TABLET ORAL at 14:47

## 2023-04-09 RX ADMIN — PREDNISOLONE ACETATE 1 DROP: 10 SUSPENSION/ DROPS OPHTHALMIC at 16:29

## 2023-04-09 RX ADMIN — BACITRACIN: 500 OINTMENT TOPICAL at 21:04

## 2023-04-09 RX ADMIN — MIDODRINE HYDROCHLORIDE 15 MG: 5 TABLET ORAL at 12:55

## 2023-04-09 RX ADMIN — BACITRACIN: 500 OINTMENT TOPICAL at 08:11

## 2023-04-09 RX ADMIN — PREDNISOLONE ACETATE 1 DROP: 10 SUSPENSION/ DROPS OPHTHALMIC at 21:01

## 2023-04-09 RX ADMIN — BACITRACIN ZINC NEOMYCIN SULFATE POLYMYXIN B SULFATE: 400; 3.5; 5 OINTMENT TOPICAL at 21:03

## 2023-04-09 RX ADMIN — BACITRACIN: 500 OINTMENT TOPICAL at 14:48

## 2023-04-09 RX ADMIN — PREDNISOLONE ACETATE 1 DROP: 10 SUSPENSION/ DROPS OPHTHALMIC at 12:55

## 2023-04-09 ASSESSMENT — PAIN SCALES - GENERAL
PAINLEVEL_OUTOF10: 0
PAINLEVEL_OUTOF10: 0

## 2023-04-10 PROCEDURE — 1200000000 HC SEMI PRIVATE

## 2023-04-10 PROCEDURE — 6370000000 HC RX 637 (ALT 250 FOR IP): Performed by: PSYCHIATRY & NEUROLOGY

## 2023-04-10 PROCEDURE — 6370000000 HC RX 637 (ALT 250 FOR IP)

## 2023-04-10 PROCEDURE — 97110 THERAPEUTIC EXERCISES: CPT

## 2023-04-10 PROCEDURE — 99232 SBSQ HOSP IP/OBS MODERATE 35: CPT | Performed by: PHYSICIAN ASSISTANT

## 2023-04-10 PROCEDURE — 6370000000 HC RX 637 (ALT 250 FOR IP): Performed by: PHYSICIAN ASSISTANT

## 2023-04-10 PROCEDURE — 97530 THERAPEUTIC ACTIVITIES: CPT

## 2023-04-10 PROCEDURE — 6370000000 HC RX 637 (ALT 250 FOR IP): Performed by: NURSE PRACTITIONER

## 2023-04-10 RX ORDER — FLUDROCORTISONE ACETATE 0.1 MG/1
0.1 TABLET ORAL DAILY
Status: DISCONTINUED | OUTPATIENT
Start: 2023-04-10 | End: 2023-04-13 | Stop reason: HOSPADM

## 2023-04-10 RX ADMIN — BACITRACIN ZINC NEOMYCIN SULFATE POLYMYXIN B SULFATE: 400; 3.5; 5 OINTMENT TOPICAL at 09:46

## 2023-04-10 RX ADMIN — FERROUS SULFATE TAB 325 MG (65 MG ELEMENTAL FE) 325 MG: 325 (65 FE) TAB at 18:00

## 2023-04-10 RX ADMIN — BACITRACIN ZINC NEOMYCIN SULFATE POLYMYXIN B SULFATE: 400; 3.5; 5 OINTMENT TOPICAL at 20:38

## 2023-04-10 RX ADMIN — MIDODRINE HYDROCHLORIDE 15 MG: 5 TABLET ORAL at 09:44

## 2023-04-10 RX ADMIN — QUETIAPINE FUMARATE 25 MG: 25 TABLET ORAL at 09:47

## 2023-04-10 RX ADMIN — CARBIDOPA AND LEVODOPA 1.5 TABLET: 25; 100 TABLET ORAL at 15:06

## 2023-04-10 RX ADMIN — ASPIRIN 81 MG: 81 TABLET, COATED ORAL at 09:59

## 2023-04-10 RX ADMIN — MIDODRINE HYDROCHLORIDE 15 MG: 5 TABLET ORAL at 15:10

## 2023-04-10 RX ADMIN — FERROUS SULFATE TAB 325 MG (65 MG ELEMENTAL FE) 325 MG: 325 (65 FE) TAB at 09:46

## 2023-04-10 RX ADMIN — BACITRACIN: 500 OINTMENT TOPICAL at 20:38

## 2023-04-10 RX ADMIN — PREDNISOLONE ACETATE 1 DROP: 10 SUSPENSION/ DROPS OPHTHALMIC at 17:25

## 2023-04-10 RX ADMIN — Medication 3 MG: at 20:41

## 2023-04-10 RX ADMIN — PANTOPRAZOLE SODIUM 40 MG: 40 TABLET, DELAYED RELEASE ORAL at 05:32

## 2023-04-10 RX ADMIN — ACETAMINOPHEN 500 MG: 500 TABLET ORAL at 20:41

## 2023-04-10 RX ADMIN — ACETAMINOPHEN 500 MG: 500 TABLET ORAL at 09:58

## 2023-04-10 RX ADMIN — BACITRACIN: 500 OINTMENT TOPICAL at 09:43

## 2023-04-10 RX ADMIN — CARBIDOPA AND LEVODOPA 1.5 TABLET: 25; 100 TABLET ORAL at 09:00

## 2023-04-10 RX ADMIN — BACITRACIN: 500 OINTMENT TOPICAL at 15:08

## 2023-04-10 RX ADMIN — FLUDROCORTISONE ACETATE 0.1 MG: 0.1 TABLET ORAL at 09:59

## 2023-04-10 RX ADMIN — PREDNISOLONE ACETATE 1 DROP: 10 SUSPENSION/ DROPS OPHTHALMIC at 20:38

## 2023-04-10 RX ADMIN — CLOPIDOGREL BISULFATE 75 MG: 75 TABLET ORAL at 09:58

## 2023-04-10 RX ADMIN — PREDNISOLONE ACETATE 1 DROP: 10 SUSPENSION/ DROPS OPHTHALMIC at 09:43

## 2023-04-10 RX ADMIN — CARBIDOPA AND LEVODOPA 1.5 TABLET: 25; 100 TABLET ORAL at 20:41

## 2023-04-10 RX ADMIN — QUETIAPINE FUMARATE 25 MG: 25 TABLET ORAL at 20:42

## 2023-04-10 RX ADMIN — PREDNISOLONE ACETATE 1 DROP: 10 SUSPENSION/ DROPS OPHTHALMIC at 12:27

## 2023-04-10 RX ADMIN — MIDODRINE HYDROCHLORIDE 15 MG: 5 TABLET ORAL at 12:27

## 2023-04-10 RX ADMIN — ATORVASTATIN CALCIUM 40 MG: 40 TABLET, FILM COATED ORAL at 20:42

## 2023-04-10 RX ADMIN — Medication 1 TABLET: at 09:47

## 2023-04-11 PROCEDURE — 6370000000 HC RX 637 (ALT 250 FOR IP)

## 2023-04-11 PROCEDURE — 1200000000 HC SEMI PRIVATE

## 2023-04-11 PROCEDURE — 99232 SBSQ HOSP IP/OBS MODERATE 35: CPT

## 2023-04-11 PROCEDURE — 6370000000 HC RX 637 (ALT 250 FOR IP): Performed by: PSYCHIATRY & NEUROLOGY

## 2023-04-11 PROCEDURE — 6370000000 HC RX 637 (ALT 250 FOR IP): Performed by: NURSE PRACTITIONER

## 2023-04-11 PROCEDURE — 6370000000 HC RX 637 (ALT 250 FOR IP): Performed by: PHYSICIAN ASSISTANT

## 2023-04-11 PROCEDURE — 97530 THERAPEUTIC ACTIVITIES: CPT

## 2023-04-11 PROCEDURE — 97129 THER IVNTJ 1ST 15 MIN: CPT

## 2023-04-11 PROCEDURE — 97535 SELF CARE MNGMENT TRAINING: CPT

## 2023-04-11 PROCEDURE — 94761 N-INVAS EAR/PLS OXIMETRY MLT: CPT

## 2023-04-11 RX ORDER — DONEPEZIL HYDROCHLORIDE 5 MG/1
5 TABLET, FILM COATED ORAL NIGHTLY
Status: DISCONTINUED | OUTPATIENT
Start: 2023-04-11 | End: 2023-04-13 | Stop reason: HOSPADM

## 2023-04-11 RX ADMIN — DONEPEZIL HYDROCHLORIDE 5 MG: 5 TABLET, FILM COATED ORAL at 20:41

## 2023-04-11 RX ADMIN — ACETAMINOPHEN 500 MG: 500 TABLET ORAL at 20:41

## 2023-04-11 RX ADMIN — FLUDROCORTISONE ACETATE 0.1 MG: 0.1 TABLET ORAL at 08:36

## 2023-04-11 RX ADMIN — BACITRACIN: 500 OINTMENT TOPICAL at 20:36

## 2023-04-11 RX ADMIN — CLOPIDOGREL BISULFATE 75 MG: 75 TABLET ORAL at 08:37

## 2023-04-11 RX ADMIN — BACITRACIN ZINC NEOMYCIN SULFATE POLYMYXIN B SULFATE: 400; 3.5; 5 OINTMENT TOPICAL at 08:38

## 2023-04-11 RX ADMIN — PREDNISOLONE ACETATE 1 DROP: 10 SUSPENSION/ DROPS OPHTHALMIC at 12:17

## 2023-04-11 RX ADMIN — Medication 1 TABLET: at 08:35

## 2023-04-11 RX ADMIN — PREDNISOLONE ACETATE 1 DROP: 10 SUSPENSION/ DROPS OPHTHALMIC at 16:18

## 2023-04-11 RX ADMIN — CARBIDOPA AND LEVODOPA 1.5 TABLET: 25; 100 TABLET ORAL at 20:41

## 2023-04-11 RX ADMIN — BACITRACIN ZINC NEOMYCIN SULFATE POLYMYXIN B SULFATE: 400; 3.5; 5 OINTMENT TOPICAL at 20:36

## 2023-04-11 RX ADMIN — QUETIAPINE FUMARATE 25 MG: 25 TABLET ORAL at 20:41

## 2023-04-11 RX ADMIN — BACITRACIN: 500 OINTMENT TOPICAL at 08:38

## 2023-04-11 RX ADMIN — FERROUS SULFATE TAB 325 MG (65 MG ELEMENTAL FE) 325 MG: 325 (65 FE) TAB at 08:36

## 2023-04-11 RX ADMIN — QUETIAPINE FUMARATE 25 MG: 25 TABLET ORAL at 08:37

## 2023-04-11 RX ADMIN — PREDNISOLONE ACETATE 1 DROP: 10 SUSPENSION/ DROPS OPHTHALMIC at 08:38

## 2023-04-11 RX ADMIN — MIDODRINE HYDROCHLORIDE 15 MG: 5 TABLET ORAL at 08:36

## 2023-04-11 RX ADMIN — ATORVASTATIN CALCIUM 40 MG: 40 TABLET, FILM COATED ORAL at 20:41

## 2023-04-11 RX ADMIN — PANTOPRAZOLE SODIUM 40 MG: 40 TABLET, DELAYED RELEASE ORAL at 05:05

## 2023-04-11 RX ADMIN — FERROUS SULFATE TAB 325 MG (65 MG ELEMENTAL FE) 325 MG: 325 (65 FE) TAB at 16:17

## 2023-04-11 RX ADMIN — ACETAMINOPHEN 500 MG: 500 TABLET ORAL at 08:37

## 2023-04-11 RX ADMIN — MIDODRINE HYDROCHLORIDE 15 MG: 5 TABLET ORAL at 12:17

## 2023-04-11 RX ADMIN — CARBIDOPA AND LEVODOPA 1.5 TABLET: 25; 100 TABLET ORAL at 14:12

## 2023-04-11 RX ADMIN — CARBIDOPA AND LEVODOPA 1.5 TABLET: 25; 100 TABLET ORAL at 08:35

## 2023-04-11 RX ADMIN — ASPIRIN 81 MG: 81 TABLET, COATED ORAL at 08:37

## 2023-04-11 RX ADMIN — PREDNISOLONE ACETATE 1 DROP: 10 SUSPENSION/ DROPS OPHTHALMIC at 20:36

## 2023-04-11 RX ADMIN — Medication 3 MG: at 20:41

## 2023-04-11 RX ADMIN — BACITRACIN: 500 OINTMENT TOPICAL at 14:11

## 2023-04-12 PROCEDURE — 99232 SBSQ HOSP IP/OBS MODERATE 35: CPT

## 2023-04-12 PROCEDURE — 6370000000 HC RX 637 (ALT 250 FOR IP): Performed by: PHYSICIAN ASSISTANT

## 2023-04-12 PROCEDURE — 6370000000 HC RX 637 (ALT 250 FOR IP): Performed by: NURSE PRACTITIONER

## 2023-04-12 PROCEDURE — 6370000000 HC RX 637 (ALT 250 FOR IP)

## 2023-04-12 PROCEDURE — 97110 THERAPEUTIC EXERCISES: CPT

## 2023-04-12 PROCEDURE — 6370000000 HC RX 637 (ALT 250 FOR IP): Performed by: PSYCHIATRY & NEUROLOGY

## 2023-04-12 PROCEDURE — 97530 THERAPEUTIC ACTIVITIES: CPT

## 2023-04-12 PROCEDURE — 97535 SELF CARE MNGMENT TRAINING: CPT

## 2023-04-12 PROCEDURE — 1200000000 HC SEMI PRIVATE

## 2023-04-12 RX ORDER — PANTOPRAZOLE SODIUM 40 MG/1
40 TABLET, DELAYED RELEASE ORAL
Qty: 30 TABLET | Refills: 0
Start: 2023-04-13

## 2023-04-12 RX ORDER — FERROUS SULFATE 325(65) MG
325 TABLET ORAL 2 TIMES DAILY WITH MEALS
Qty: 30 TABLET | Refills: 0
Start: 2023-04-12

## 2023-04-12 RX ORDER — QUETIAPINE FUMARATE 25 MG/1
25 TABLET, FILM COATED ORAL DAILY
Qty: 60 TABLET | Refills: 0
Start: 2023-04-12

## 2023-04-12 RX ORDER — FLUDROCORTISONE ACETATE 0.1 MG/1
0.1 TABLET ORAL DAILY
Qty: 30 TABLET | Refills: 0
Start: 2023-04-13 | End: 2023-05-13

## 2023-04-12 RX ORDER — QUETIAPINE FUMARATE 25 MG/1
50 TABLET, FILM COATED ORAL NIGHTLY
Qty: 60 TABLET | Refills: 0
Start: 2023-04-12

## 2023-04-12 RX ORDER — DONEPEZIL HYDROCHLORIDE 5 MG/1
5 TABLET, FILM COATED ORAL NIGHTLY
Qty: 30 TABLET | Refills: 0
Start: 2023-04-12

## 2023-04-12 RX ORDER — LANOLIN ALCOHOL/MO/W.PET/CERES
3 CREAM (GRAM) TOPICAL NIGHTLY
Qty: 30 TABLET | Refills: 0
Start: 2023-04-12

## 2023-04-12 RX ORDER — MIDODRINE HYDROCHLORIDE 5 MG/1
15 TABLET ORAL 3 TIMES DAILY
Qty: 240 TABLET | Refills: 0
Start: 2023-04-13

## 2023-04-12 RX ADMIN — CARBIDOPA AND LEVODOPA 1.5 TABLET: 25; 100 TABLET ORAL at 14:14

## 2023-04-12 RX ADMIN — FLUDROCORTISONE ACETATE 0.1 MG: 0.1 TABLET ORAL at 08:59

## 2023-04-12 RX ADMIN — FERROUS SULFATE TAB 325 MG (65 MG ELEMENTAL FE) 325 MG: 325 (65 FE) TAB at 17:25

## 2023-04-12 RX ADMIN — PANTOPRAZOLE SODIUM 40 MG: 40 TABLET, DELAYED RELEASE ORAL at 05:26

## 2023-04-12 RX ADMIN — CLOPIDOGREL BISULFATE 75 MG: 75 TABLET ORAL at 08:53

## 2023-04-12 RX ADMIN — BACITRACIN: 500 OINTMENT TOPICAL at 08:58

## 2023-04-12 RX ADMIN — PREDNISOLONE ACETATE 1 DROP: 10 SUSPENSION/ DROPS OPHTHALMIC at 17:25

## 2023-04-12 RX ADMIN — ACETAMINOPHEN 500 MG: 500 TABLET ORAL at 20:30

## 2023-04-12 RX ADMIN — Medication 1 TABLET: at 08:54

## 2023-04-12 RX ADMIN — PREDNISOLONE ACETATE 1 DROP: 10 SUSPENSION/ DROPS OPHTHALMIC at 08:57

## 2023-04-12 RX ADMIN — QUETIAPINE FUMARATE 25 MG: 25 TABLET ORAL at 08:57

## 2023-04-12 RX ADMIN — ACETAMINOPHEN 500 MG: 500 TABLET ORAL at 08:53

## 2023-04-12 RX ADMIN — PREDNISOLONE ACETATE 1 DROP: 10 SUSPENSION/ DROPS OPHTHALMIC at 20:31

## 2023-04-12 RX ADMIN — ATORVASTATIN CALCIUM 40 MG: 40 TABLET, FILM COATED ORAL at 20:30

## 2023-04-12 RX ADMIN — MIDODRINE HYDROCHLORIDE 15 MG: 5 TABLET ORAL at 08:54

## 2023-04-12 RX ADMIN — FERROUS SULFATE TAB 325 MG (65 MG ELEMENTAL FE) 325 MG: 325 (65 FE) TAB at 08:54

## 2023-04-12 RX ADMIN — BACITRACIN ZINC NEOMYCIN SULFATE POLYMYXIN B SULFATE: 400; 3.5; 5 OINTMENT TOPICAL at 08:58

## 2023-04-12 RX ADMIN — ASPIRIN 81 MG: 81 TABLET, COATED ORAL at 08:53

## 2023-04-12 RX ADMIN — BACITRACIN ZINC NEOMYCIN SULFATE POLYMYXIN B SULFATE: 400; 3.5; 5 OINTMENT TOPICAL at 20:31

## 2023-04-12 RX ADMIN — QUETIAPINE FUMARATE 25 MG: 25 TABLET ORAL at 20:30

## 2023-04-12 RX ADMIN — BACITRACIN: 500 OINTMENT TOPICAL at 20:31

## 2023-04-12 RX ADMIN — DONEPEZIL HYDROCHLORIDE 5 MG: 5 TABLET, FILM COATED ORAL at 20:30

## 2023-04-12 RX ADMIN — CARBIDOPA AND LEVODOPA 1.5 TABLET: 25; 100 TABLET ORAL at 20:35

## 2023-04-12 RX ADMIN — QUETIAPINE FUMARATE 25 MG: 25 TABLET ORAL at 20:37

## 2023-04-12 RX ADMIN — MIDODRINE HYDROCHLORIDE 15 MG: 5 TABLET ORAL at 11:56

## 2023-04-12 RX ADMIN — Medication 3 MG: at 20:30

## 2023-04-12 RX ADMIN — PREDNISOLONE ACETATE 1 DROP: 10 SUSPENSION/ DROPS OPHTHALMIC at 11:56

## 2023-04-12 RX ADMIN — CARBIDOPA AND LEVODOPA 1.5 TABLET: 25; 100 TABLET ORAL at 08:54

## 2023-04-12 RX ADMIN — BACITRACIN: 500 OINTMENT TOPICAL at 14:14

## 2023-04-13 VITALS
HEIGHT: 66 IN | WEIGHT: 162.7 LBS | DIASTOLIC BLOOD PRESSURE: 85 MMHG | RESPIRATION RATE: 16 BRPM | BODY MASS INDEX: 26.15 KG/M2 | HEART RATE: 79 BPM | TEMPERATURE: 97.9 F | SYSTOLIC BLOOD PRESSURE: 133 MMHG | OXYGEN SATURATION: 98 %

## 2023-04-13 PROCEDURE — 6370000000 HC RX 637 (ALT 250 FOR IP): Performed by: PHYSICIAN ASSISTANT

## 2023-04-13 PROCEDURE — 6370000000 HC RX 637 (ALT 250 FOR IP)

## 2023-04-13 PROCEDURE — 6370000000 HC RX 637 (ALT 250 FOR IP): Performed by: NURSE PRACTITIONER

## 2023-04-13 PROCEDURE — 6370000000 HC RX 637 (ALT 250 FOR IP): Performed by: PSYCHIATRY & NEUROLOGY

## 2023-04-13 PROCEDURE — 99239 HOSP IP/OBS DSCHRG MGMT >30: CPT | Performed by: NURSE PRACTITIONER

## 2023-04-13 RX ADMIN — FERROUS SULFATE TAB 325 MG (65 MG ELEMENTAL FE) 325 MG: 325 (65 FE) TAB at 08:08

## 2023-04-13 RX ADMIN — CARBIDOPA AND LEVODOPA 1.5 TABLET: 25; 100 TABLET ORAL at 08:08

## 2023-04-13 RX ADMIN — MIDODRINE HYDROCHLORIDE 15 MG: 5 TABLET ORAL at 08:07

## 2023-04-13 RX ADMIN — PREDNISOLONE ACETATE 1 DROP: 10 SUSPENSION/ DROPS OPHTHALMIC at 12:39

## 2023-04-13 RX ADMIN — ASPIRIN 81 MG: 81 TABLET, COATED ORAL at 08:08

## 2023-04-13 RX ADMIN — MIDODRINE HYDROCHLORIDE 15 MG: 5 TABLET ORAL at 12:39

## 2023-04-13 RX ADMIN — PANTOPRAZOLE SODIUM 40 MG: 40 TABLET, DELAYED RELEASE ORAL at 05:39

## 2023-04-13 RX ADMIN — FLUDROCORTISONE ACETATE 0.1 MG: 0.1 TABLET ORAL at 08:08

## 2023-04-13 RX ADMIN — Medication 1 TABLET: at 08:07

## 2023-04-13 RX ADMIN — QUETIAPINE FUMARATE 25 MG: 25 TABLET ORAL at 08:07

## 2023-04-13 RX ADMIN — BACITRACIN ZINC NEOMYCIN SULFATE POLYMYXIN B SULFATE: 400; 3.5; 5 OINTMENT TOPICAL at 08:08

## 2023-04-13 RX ADMIN — CLOPIDOGREL BISULFATE 75 MG: 75 TABLET ORAL at 08:08

## 2023-04-13 RX ADMIN — ACETAMINOPHEN 500 MG: 500 TABLET ORAL at 08:08

## 2023-04-13 RX ADMIN — PREDNISOLONE ACETATE 1 DROP: 10 SUSPENSION/ DROPS OPHTHALMIC at 08:08

## 2023-04-24 VITALS
BODY MASS INDEX: 23.73 KG/M2 | WEIGHT: 147 LBS | OXYGEN SATURATION: 94 % | HEART RATE: 86 BPM | RESPIRATION RATE: 16 BRPM | TEMPERATURE: 97.8 F | DIASTOLIC BLOOD PRESSURE: 84 MMHG | SYSTOLIC BLOOD PRESSURE: 130 MMHG

## 2023-04-24 ASSESSMENT — ENCOUNTER SYMPTOMS
CONSTIPATION: 0
NAUSEA: 0
DIARRHEA: 0
SHORTNESS OF BREATH: 0
EYE DISCHARGE: 0
COUGH: 0
EYE REDNESS: 0
VOMITING: 0

## 2023-04-24 NOTE — PROGRESS NOTES
Pedro Soliman is a 79 y.o. female who presents today for her medical conditions/complaints as noted below. Chief Complaint   Patient presents with    Other     Admission to [address] [place] [address]  4/13/23       HPI:     Franco Rolon is an 78 yo female who was seen today for her admission to [address] [place] [address] Justen Su (admitted 4/13/23). She was admitted to [facility] from 3/26/23-4/13/23. Hospital records, labs, and imaging were reviewed and are summarized below. Her chronic medical conditions include Parkinson's disease, orthostatic hypotension, CAD, HLD, CVD, and xeropthalmia. On 3/26 she fell at home, maybe due to a syncopal episode. In the ER her BP dropped to 60 systolic. She had a large scalp wound with active arterial bleeding. She was intubated and taken to the OR for control of the scalp hemorrhage. She tolerated the flap procedure well. She had hypokalemia that resolved with treatment. She had orthostatic hypotension that improved with Midodrine and Florinef. Today she was seen in the [facility] area [facility] and was pleasant and conversant. She will be discharged on 4/28/23 with [place]. She is hoping it will make her stronger. Home Health  Physician to Follow Referral: her PCP    I certify that I, or a nurse practitioner or physician assistant working with me, had an in-person encounter with Pedro Soliman on 4/26/2023 and the reason for the home care services is documented in the clinical note for that day.       Pedro Soliman was assessed on the above date and was found to have medical conditions requiring Home Care that included Parkinson's disease, orthostatic hypotension, syncope    Homebound Status: further, I certify that my clinical findings support that Pedro Soliman does meet the Medicare definition of \"Confined to the Home\" because of   Dyspnea on exertion related to diagnosis of  Parkinson's disease  Deconditioned due to medical condition PD, orthostatic

## 2023-04-26 ENCOUNTER — OUTSIDE SERVICES (OUTPATIENT)
Dept: FAMILY MEDICINE CLINIC | Age: 83
End: 2023-04-26
Payer: MEDICARE

## 2023-04-26 ENCOUNTER — TELEPHONE (OUTPATIENT)
Dept: FAMILY MEDICINE CLINIC | Age: 83
End: 2023-04-26

## 2023-04-26 DIAGNOSIS — I95.1 ORTHOSTATIC HYPOTENSION: ICD-10-CM

## 2023-04-26 DIAGNOSIS — R29.6 FREQUENT FALLS: ICD-10-CM

## 2023-04-26 DIAGNOSIS — S09.90XD TRAUMATIC HEAD INJURY WITH MULTIPLE LACERATIONS, SUBSEQUENT ENCOUNTER: Primary | ICD-10-CM

## 2023-04-26 DIAGNOSIS — S01.91XD TRAUMATIC HEAD INJURY WITH MULTIPLE LACERATIONS, SUBSEQUENT ENCOUNTER: Primary | ICD-10-CM

## 2023-04-26 DIAGNOSIS — F03.918 DEMENTIA WITH BEHAVIORAL DISTURBANCE (HCC): ICD-10-CM

## 2023-04-26 DIAGNOSIS — I25.10 CORONARY ARTERY DISEASE INVOLVING NATIVE CORONARY ARTERY OF NATIVE HEART WITHOUT ANGINA PECTORIS: ICD-10-CM

## 2023-04-26 DIAGNOSIS — E78.00 PURE HYPERCHOLESTEROLEMIA: ICD-10-CM

## 2023-04-26 DIAGNOSIS — G20 PARKINSON'S DISEASE (HCC): ICD-10-CM

## 2023-04-26 PROCEDURE — 99306 1ST NF CARE HIGH MDM 50: CPT | Performed by: FAMILY MEDICINE

## 2023-04-26 RX ORDER — PANTOPRAZOLE SODIUM 40 MG/1
40 TABLET, DELAYED RELEASE ORAL
Qty: 14 TABLET | Refills: 0 | Status: SHIPPED | OUTPATIENT
Start: 2023-04-26

## 2023-04-26 RX ORDER — QUETIAPINE FUMARATE 25 MG/1
TABLET, FILM COATED ORAL
Qty: 42 TABLET | Refills: 0 | Status: SHIPPED | OUTPATIENT
Start: 2023-04-26

## 2023-04-26 RX ORDER — DONEPEZIL HYDROCHLORIDE 5 MG/1
5 TABLET, FILM COATED ORAL NIGHTLY
Qty: 14 TABLET | Refills: 0 | Status: SHIPPED | OUTPATIENT
Start: 2023-04-26

## 2023-04-26 RX ORDER — FERROUS SULFATE 325(65) MG
325 TABLET ORAL 2 TIMES DAILY WITH MEALS
Qty: 14 TABLET | Refills: 0 | Status: SHIPPED | OUTPATIENT
Start: 2023-04-26 | End: 2023-05-10

## 2023-04-26 RX ORDER — FLUDROCORTISONE ACETATE 0.1 MG/1
0.1 TABLET ORAL DAILY
Qty: 14 TABLET | Refills: 0 | Status: SHIPPED | OUTPATIENT
Start: 2023-04-26 | End: 2023-05-26

## 2023-05-12 ENCOUNTER — HOSPITAL ENCOUNTER (OUTPATIENT)
Age: 83
Setting detail: SPECIMEN
Discharge: HOME | End: 2023-05-12
Payer: MEDICARE

## 2023-05-12 LAB — POTASSIUM SERPL-SCNC: 3.6 MEQ/L (ref 3.5–5.2)

## 2023-05-12 PROCEDURE — 84132 ASSAY OF SERUM POTASSIUM: CPT

## 2023-05-15 ENCOUNTER — HOSPITAL ENCOUNTER (EMERGENCY)
Age: 83
Discharge: HOME | End: 2023-05-15
Attending: EMERGENCY MEDICINE
Payer: MEDICARE

## 2023-05-15 VITALS
OXYGEN SATURATION: 98 % | RESPIRATION RATE: 16 BRPM | TEMPERATURE: 97.9 F | SYSTOLIC BLOOD PRESSURE: 159 MMHG | BODY MASS INDEX: 23.73 KG/M2 | WEIGHT: 147 LBS | DIASTOLIC BLOOD PRESSURE: 80 MMHG | HEART RATE: 79 BPM

## 2023-05-15 DIAGNOSIS — N30.00 ACUTE CYSTITIS WITHOUT HEMATURIA: Primary | ICD-10-CM

## 2023-05-15 LAB
AMORPH SED URNS QL MICRO: ABNORMAL
ANION GAP SERPL CALC-SCNC: 10 MEQ/L (ref 8–16)
BACTERIA URNS QL MICRO: ABNORMAL /HPF
BASOPHILS ABSOLUTE: 0 THOU/MM3 (ref 0–0.1)
BASOPHILS NFR BLD AUTO: 0.5 %
BILIRUB UR QL STRIP.AUTO: NEGATIVE
BUN SERPL-MCNC: 16 MG/DL (ref 7–22)
CALCIUM SERPL-MCNC: 9.3 MG/DL (ref 8.5–10.5)
CASTS #/AREA URNS LPF: ABNORMAL /LPF
CHARACTER UR: ABNORMAL
CHLORIDE SERPL-SCNC: 101 MEQ/L (ref 98–111)
CO2 SERPL-SCNC: 30 MEQ/L (ref 23–33)
COLOR: YELLOW
CREAT SERPL-MCNC: 0.9 MG/DL (ref 0.4–1.2)
CRYSTALS URNS MICRO: ABNORMAL
DEPRECATED RDW RBC AUTO: 50.4 FL (ref 35–45)
EOSINOPHIL NFR BLD AUTO: 1.8 %
EOSINOPHILS ABSOLUTE: 0.1 THOU/MM3 (ref 0–0.4)
EPITHELIAL CELLS, UA: ABNORMAL /HPF
ERYTHROCYTE [DISTWIDTH] IN BLOOD BY AUTOMATED COUNT: 14.3 % (ref 11.5–14.5)
GFR SERPL CREATININE-BSD FRML MDRD: > 60 ML/MIN/1.73M2
GLUCOSE SERPL-MCNC: 155 MG/DL (ref 70–108)
GLUCOSE UR QL STRIP.AUTO: NEGATIVE MG/DL
HCT VFR BLD AUTO: 37.4 % (ref 37–47)
HGB BLD-MCNC: 11.6 GM/DL (ref 12–16)
HGB UR QL STRIP.AUTO: NEGATIVE
IMM GRANULOCYTES # BLD AUTO: 0.02 THOU/MM3 (ref 0–0.07)
IMM GRANULOCYTES NFR BLD AUTO: 0.3 %
KETONES UR QL STRIP.AUTO: NEGATIVE
LYMPHOCYTES ABSOLUTE: 1.4 THOU/MM3 (ref 1–4.8)
LYMPHOCYTES NFR BLD AUTO: 21.9 %
MCH RBC QN AUTO: 30 PG (ref 26–33)
MCHC RBC AUTO-ENTMCNC: 31 GM/DL (ref 32.2–35.5)
MCV RBC AUTO: 96.6 FL (ref 81–99)
MONOCYTES ABSOLUTE: 0.6 THOU/MM3 (ref 0.4–1.3)
MONOCYTES NFR BLD AUTO: 9 %
NEUTROPHILS NFR BLD AUTO: 66.5 %
NITRITE UR QL STRIP: POSITIVE
NRBC BLD AUTO-RTO: 0 /100 WBC
OSMOLALITY SERPL CALC.SUM OF ELEC: 285.6 MOSMOL/KG (ref 275–300)
PH UR STRIP.AUTO: 8.5 [PH] (ref 5–9)
PLATELET # BLD AUTO: 138 THOU/MM3 (ref 130–400)
PMV BLD AUTO: 10.9 FL (ref 9.4–12.4)
POTASSIUM SERPL-SCNC: 3.7 MEQ/L (ref 3.5–5.2)
PROT UR STRIP.AUTO-MCNC: NEGATIVE MG/DL
RBC # BLD AUTO: 3.87 MILL/MM3 (ref 4.2–5.4)
RBC URINE: ABNORMAL /HPF
SEGMENTED NEUTROPHILS ABSOLUTE COUNT: 4.2 THOU/MM3 (ref 1.8–7.7)
SODIUM SERPL-SCNC: 141 MEQ/L (ref 135–145)
SP GR UR REFRACT.AUTO: 1.01 (ref 1–1.03)
UROBILINOGEN, URINE: 0.2 EU/DL (ref 0–1)
WBC # BLD AUTO: 6.3 THOU/MM3 (ref 4.8–10.8)
WBC #/AREA URNS HPF: ABNORMAL /HPF
WBC #/AREA URNS HPF: ABNORMAL /[HPF]

## 2023-05-15 PROCEDURE — 80048 BASIC METABOLIC PNL TOTAL CA: CPT

## 2023-05-15 PROCEDURE — 2500000003 HC RX 250 WO HCPCS: Performed by: STUDENT IN AN ORGANIZED HEALTH CARE EDUCATION/TRAINING PROGRAM

## 2023-05-15 PROCEDURE — 6360000002 HC RX W HCPCS: Performed by: STUDENT IN AN ORGANIZED HEALTH CARE EDUCATION/TRAINING PROGRAM

## 2023-05-15 PROCEDURE — 99284 EMERGENCY DEPT VISIT MOD MDM: CPT

## 2023-05-15 PROCEDURE — 85025 COMPLETE CBC W/AUTO DIFF WBC: CPT

## 2023-05-15 PROCEDURE — 81001 URINALYSIS AUTO W/SCOPE: CPT

## 2023-05-15 PROCEDURE — 36415 COLL VENOUS BLD VENIPUNCTURE: CPT

## 2023-05-15 PROCEDURE — 96372 THER/PROPH/DIAG INJ SC/IM: CPT

## 2023-05-15 RX ORDER — CEPHALEXIN 500 MG/1
500 CAPSULE ORAL 2 TIMES DAILY
Qty: 14 CAPSULE | Refills: 0 | Status: SHIPPED | OUTPATIENT
Start: 2023-05-15 | End: 2023-05-22

## 2023-05-15 RX ADMIN — LIDOCAINE HYDROCHLORIDE 1000 MG: 10 INJECTION, SOLUTION EPIDURAL; INFILTRATION; INTRACAUDAL; PERINEURAL at 16:38

## 2023-05-15 NOTE — ED PROVIDER NOTES
[facility]\"" [address] [address] EMERGENCY DEPT    EMERGENCY MEDICINE      Pt Name: Samantha Lopez  MRN: [id number]  [place] 1940  Date of evaluation: 5/15/2023  Treating Resident Physician: Debra Jimenez DO  Supervising Physician: Naeem       Chief Complaint   Patient presents with    Urinary Tract Infection    Fatigue     History obtained from chart review, the patient, and  at bedside. HISTORY OF PRESENT ILLNESS    HPI    Samantha Lopez is a 79 y.o. female presents to the emergency department for evaluation of fatigue and abnormal smelling urine. Patient states that she has been recovering from a fall approximately 1 week ago. Receiving assistance from her  who noticed that Jose Guadalupe CHIN is going to the bathroom more frequently than normal and that her urine smells of a strange odor. Denies any blood or abnormal colors. She has been sleeping a little bit more than normal which is why he brought her into the emergency department today. No fever, chills, abdominal pain, dark stools, changes in vaginal discharge. The patient has no other acute complaints at this time. PAST MEDICAL AND SURGICAL HISTORY     Past Medical History:   Diagnosis Date    Anti-phospholipid antibody syndrome ([facility] [address]     Cancer ([facility] [address]     uterine     Memory disorder        Past Surgical History:   Procedure Laterality Date    EYE SURGERY Bilateral     corneal transplants     FACIAL SURGERY N/A 3/26/2023    FACIAL LACERATION REPAIR performed by Magaly Cline MD at [address] [place] [address] (CERVIX STATUS UNKNOWN)         CURRENT MEDICATIONS     Previous Medications    ACETAMINOPHEN (TYLENOL) 500 MG TABLET    Take 500 mg by mouth in the morning and 500 mg before bedtime. ASPIRIN 81 MG EC TABLET    Take 81 mg by mouth daily     ATORVASTATIN (LIPITOR) 40 MG TABLET        CALCIUM CARBONATE-VITAMIN D 600-200 MG-UNIT TABS    Take  by mouth.       CARBIDOPA-LEVODOPA (SINEMET)  MG PER TABLET

## 2023-05-15 NOTE — ED NOTES
Patient in restroom located in room to obtain urine sample at this time.       Lucía Gaviria RN  05/15/23 5231

## 2023-05-15 NOTE — ED PROVIDER NOTES
[facility] MEDICINE ATTENDING ATTESTATION      Evaluation of Demetria Gregorio. Case discussed and care plan developed with resident physician. I agree with the resident physician documentation and plan as documented by him, except if my documentation differs. Patient seen under indirect supervision. I reviewed the medical, surgical, family and social history, medications and allergies. I have reviewed the nursing documentation. Please see the resident physician completed note for final disposition except as documented on this attestation. I have reviewed and interpreted all available lab, radiology and ekg results available at the moment. Diagnosis, treatment and disposition plans were discussed and agreed upon by patient. This transcription was electronically signed. It was dictated by use of voice recognition software and electronically transcribed. The transcription may contain errors not detected in proofreading.      I performed direct supervision and was present for the critical portion following procedures: None  Critical care time on this case: None    Electronically signed by Cathleen Andrade MD on 5/15/23 at 4:20 PM EDT        Cathleen Andrade MD  05/15/23 9911

## 2023-05-22 ENCOUNTER — APPOINTMENT (OUTPATIENT)
Dept: GENERAL RADIOLOGY | Age: 83
End: 2023-05-22
Payer: MEDICARE

## 2023-05-22 ENCOUNTER — HOSPITAL ENCOUNTER (INPATIENT)
Age: 83
LOS: 4 days | Discharge: HOME | End: 2023-05-26
Attending: STUDENT IN AN ORGANIZED HEALTH CARE EDUCATION/TRAINING PROGRAM | Admitting: SURGERY
Payer: MEDICARE

## 2023-05-22 ENCOUNTER — APPOINTMENT (OUTPATIENT)
Dept: CT IMAGING | Age: 83
End: 2023-05-22
Payer: MEDICARE
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
## 2023-05-22 DIAGNOSIS — S06.369A TRAUMATIC INTRACEREBRAL HEMORRHAGE WITH LOSS OF CONSCIOUSNESS, UNSPECIFIED LATERALITY, INITIAL ENCOUNTER (HCC): Primary | ICD-10-CM

## 2023-05-22 PROBLEM — I60.9 SAH (SUBARACHNOID HEMORRHAGE) (HCC): Status: ACTIVE | Noted: 2023-05-22

## 2023-05-22 LAB
ALBUMIN SERPL BCG-MCNC: 3.5 G/DL (ref 3.5–5.1)
ALP SERPL-CCNC: 45 U/L (ref 38–126)
ALT SERPL W/O P-5'-P-CCNC: < 5 U/L (ref 11–66)
ANION GAP SERPL CALC-SCNC: 10 MEQ/L (ref 8–16)
APTT PPP: 31 SECONDS (ref 22–38)
AST SERPL-CCNC: 17 U/L (ref 5–40)
BASOPHILS ABSOLUTE: 0 THOU/MM3 (ref 0–0.1)
BASOPHILS NFR BLD AUTO: 0.3 %
BILIRUB SERPL-MCNC: 0.7 MG/DL (ref 0.3–1.2)
BUN SERPL-MCNC: 26 MG/DL (ref 7–22)
CALCIUM SERPL-MCNC: 9.3 MG/DL (ref 8.5–10.5)
CHLORIDE SERPL-SCNC: 101 MEQ/L (ref 98–111)
CO2 SERPL-SCNC: 28 MEQ/L (ref 23–33)
CREAT SERPL-MCNC: 1.1 MG/DL (ref 0.4–1.2)
DEPRECATED RDW RBC AUTO: 50.1 FL (ref 35–45)
EOSINOPHIL NFR BLD AUTO: 1.9 %
EOSINOPHILS ABSOLUTE: 0.2 THOU/MM3 (ref 0–0.4)
ERYTHROCYTE [DISTWIDTH] IN BLOOD BY AUTOMATED COUNT: 14.2 % (ref 11.5–14.5)
ETHANOL SERPL-MCNC: < 0.01 %
ETHANOL SERPL-MCNC: < 0.01 %
GFR SERPL CREATININE-BSD FRML MDRD: 49 ML/MIN/1.73M2
GLUCOSE SERPL-MCNC: 136 MG/DL (ref 70–108)
HCT VFR BLD AUTO: 37.4 % (ref 37–47)
HGB BLD-MCNC: 11.5 GM/DL (ref 12–16)
IMM GRANULOCYTES # BLD AUTO: 0.03 THOU/MM3 (ref 0–0.07)
IMM GRANULOCYTES NFR BLD AUTO: 0.3 %
INR PPP: 0.98 (ref 0.85–1.13)
LYMPHOCYTES ABSOLUTE: 1.5 THOU/MM3 (ref 1–4.8)
LYMPHOCYTES NFR BLD AUTO: 17.1 %
MCH RBC QN AUTO: 29.4 PG (ref 26–33)
MCHC RBC AUTO-ENTMCNC: 30.7 GM/DL (ref 32.2–35.5)
MCV RBC AUTO: 95.7 FL (ref 81–99)
MONOCYTES ABSOLUTE: 0.8 THOU/MM3 (ref 0.4–1.3)
MONOCYTES NFR BLD AUTO: 8.8 %
NEUTROPHILS NFR BLD AUTO: 71.6 %
NRBC BLD AUTO-RTO: 0 /100 WBC
OSMOLALITY SERPL CALC.SUM OF ELEC: 284.4 MOSMOL/KG (ref 275–300)
PLATELET # BLD AUTO: 121 THOU/MM3 (ref 130–400)
PMV BLD AUTO: 11.6 FL (ref 9.4–12.4)
POTASSIUM SERPL-SCNC: 3.6 MEQ/L (ref 3.5–5.2)
PROT SERPL-MCNC: 6.7 G/DL (ref 6.1–8)
RBC # BLD AUTO: 3.91 MILL/MM3 (ref 4.2–5.4)
SEGMENTED NEUTROPHILS ABSOLUTE COUNT: 6.3 THOU/MM3 (ref 1.8–7.7)
SODIUM SERPL-SCNC: 139 MEQ/L (ref 135–145)
TROPONIN T: < 0.01 NG/ML
WBC # BLD AUTO: 8.8 THOU/MM3 (ref 4.8–10.8)

## 2023-05-22 PROCEDURE — 80053 COMPREHEN METABOLIC PANEL: CPT

## 2023-05-22 PROCEDURE — 2580000003 HC RX 258: Performed by: PHYSICIAN ASSISTANT

## 2023-05-22 PROCEDURE — 99285 EMERGENCY DEPT VISIT HI MDM: CPT

## 2023-05-22 PROCEDURE — 96374 THER/PROPH/DIAG INJ IV PUSH: CPT

## 2023-05-22 PROCEDURE — 85610 PROTHROMBIN TIME: CPT

## 2023-05-22 PROCEDURE — 82077 ASSAY SPEC XCP UR&BREATH IA: CPT

## 2023-05-22 PROCEDURE — 85730 THROMBOPLASTIN TIME PARTIAL: CPT

## 2023-05-22 PROCEDURE — 2100000000 HC CCU R&B

## 2023-05-22 PROCEDURE — 6360000002 HC RX W HCPCS: Performed by: STUDENT IN AN ORGANIZED HEALTH CARE EDUCATION/TRAINING PROGRAM

## 2023-05-22 PROCEDURE — 84484 ASSAY OF TROPONIN QUANT: CPT

## 2023-05-22 PROCEDURE — 6370000000 HC RX 637 (ALT 250 FOR IP): Performed by: PHYSICIAN ASSISTANT

## 2023-05-22 PROCEDURE — 71046 X-RAY EXAM CHEST 2 VIEWS: CPT

## 2023-05-22 PROCEDURE — 70450 CT HEAD/BRAIN W/O DYE: CPT

## 2023-05-22 PROCEDURE — 85025 COMPLETE CBC W/AUTO DIFF WBC: CPT

## 2023-05-22 PROCEDURE — 93005 ELECTROCARDIOGRAM TRACING: CPT | Performed by: STUDENT IN AN ORGANIZED HEALTH CARE EDUCATION/TRAINING PROGRAM

## 2023-05-22 PROCEDURE — 99222 1ST HOSP IP/OBS MODERATE 55: CPT | Performed by: SURGERY

## 2023-05-22 PROCEDURE — 72125 CT NECK SPINE W/O DYE: CPT

## 2023-05-22 PROCEDURE — 36415 COLL VENOUS BLD VENIPUNCTURE: CPT

## 2023-05-22 RX ORDER — SODIUM CHLORIDE 0.9 % (FLUSH) 0.9 %
5-40 SYRINGE (ML) INJECTION EVERY 12 HOURS SCHEDULED
Status: DISCONTINUED | OUTPATIENT
Start: 2023-05-22 | End: 2023-05-26 | Stop reason: HOSPADM

## 2023-05-22 RX ORDER — POTASSIUM CHLORIDE 750 MG/1
10 CAPSULE, EXTENDED RELEASE ORAL DAILY
COMMUNITY

## 2023-05-22 RX ORDER — OXYCODONE HYDROCHLORIDE 5 MG/1
5 TABLET ORAL EVERY 4 HOURS PRN
Status: DISCONTINUED | OUTPATIENT
Start: 2023-05-22 | End: 2023-05-22

## 2023-05-22 RX ORDER — OXYCODONE HYDROCHLORIDE 5 MG/1
10 TABLET ORAL EVERY 4 HOURS PRN
Status: DISCONTINUED | OUTPATIENT
Start: 2023-05-22 | End: 2023-05-22

## 2023-05-22 RX ORDER — HYDRALAZINE HYDROCHLORIDE 20 MG/ML
10 INJECTION INTRAMUSCULAR; INTRAVENOUS ONCE
Status: COMPLETED | OUTPATIENT
Start: 2023-05-22 | End: 2023-05-22

## 2023-05-22 RX ORDER — POLYETHYLENE GLYCOL 3350 17 G/17G
17 POWDER, FOR SOLUTION ORAL DAILY
Status: DISCONTINUED | OUTPATIENT
Start: 2023-05-22 | End: 2023-05-26 | Stop reason: HOSPADM

## 2023-05-22 RX ORDER — HYDROCODONE BITARTRATE AND ACETAMINOPHEN 5; 325 MG/1; MG/1
2 TABLET ORAL EVERY 4 HOURS PRN
Status: DISCONTINUED | OUTPATIENT
Start: 2023-05-22 | End: 2023-05-26 | Stop reason: HOSPADM

## 2023-05-22 RX ORDER — ONDANSETRON 4 MG/1
4 TABLET, ORALLY DISINTEGRATING ORAL EVERY 8 HOURS PRN
Status: DISCONTINUED | OUTPATIENT
Start: 2023-05-22 | End: 2023-05-26 | Stop reason: HOSPADM

## 2023-05-22 RX ORDER — ACETAMINOPHEN 325 MG/1
650 TABLET ORAL EVERY 4 HOURS PRN
Status: DISCONTINUED | OUTPATIENT
Start: 2023-05-22 | End: 2023-05-26 | Stop reason: HOSPADM

## 2023-05-22 RX ORDER — SODIUM CHLORIDE 9 MG/ML
INJECTION, SOLUTION INTRAVENOUS PRN
Status: DISCONTINUED | OUTPATIENT
Start: 2023-05-22 | End: 2023-05-26 | Stop reason: HOSPADM

## 2023-05-22 RX ORDER — SODIUM CHLORIDE 0.9 % (FLUSH) 0.9 %
5-40 SYRINGE (ML) INJECTION PRN
Status: DISCONTINUED | OUTPATIENT
Start: 2023-05-22 | End: 2023-05-26 | Stop reason: HOSPADM

## 2023-05-22 RX ORDER — HYDROCODONE BITARTRATE AND ACETAMINOPHEN 5; 325 MG/1; MG/1
1 TABLET ORAL EVERY 4 HOURS PRN
Status: DISCONTINUED | OUTPATIENT
Start: 2023-05-22 | End: 2023-05-26 | Stop reason: HOSPADM

## 2023-05-22 RX ORDER — SODIUM CHLORIDE 9 MG/ML
INJECTION, SOLUTION INTRAVENOUS CONTINUOUS
Status: DISCONTINUED | OUTPATIENT
Start: 2023-05-22 | End: 2023-05-26 | Stop reason: HOSPADM

## 2023-05-22 RX ORDER — GINSENG 100 MG
CAPSULE ORAL 3 TIMES DAILY
Status: DISCONTINUED | OUTPATIENT
Start: 2023-05-22 | End: 2023-05-26 | Stop reason: HOSPADM

## 2023-05-22 RX ORDER — ONDANSETRON 2 MG/ML
4 INJECTION INTRAMUSCULAR; INTRAVENOUS EVERY 6 HOURS PRN
Status: DISCONTINUED | OUTPATIENT
Start: 2023-05-22 | End: 2023-05-26 | Stop reason: HOSPADM

## 2023-05-22 RX ADMIN — BACITRACIN: 500 OINTMENT TOPICAL at 21:22

## 2023-05-22 RX ADMIN — SODIUM CHLORIDE, PRESERVATIVE FREE 10 ML: 5 INJECTION INTRAVENOUS at 21:20

## 2023-05-22 RX ADMIN — HYDRALAZINE HYDROCHLORIDE 10 MG: 20 INJECTION INTRAMUSCULAR; INTRAVENOUS at 16:18

## 2023-05-22 RX ADMIN — SODIUM CHLORIDE: 9 INJECTION, SOLUTION INTRAVENOUS at 21:20

## 2023-05-22 ASSESSMENT — PAIN - FUNCTIONAL ASSESSMENT
PAIN_FUNCTIONAL_ASSESSMENT: NONE - DENIES PAIN

## 2023-05-22 NOTE — ED NOTES
RN attempting to call  to complete MRI screening and update on bed assignment.  did not answer.       Juan Jose Del Rio RN  05/22/23 9375

## 2023-05-22 NOTE — ED NOTES
Pt presents to the ED after patient fell. Pt has hx of falls and discontinued her plavix about 3 weeks ago due to increased number of falls. Pt lives at home with . Pt has hx of dementia and parkinson's. Pt is alert and oriented times 3, patient is disoriented on year, which is normal per . Pt arrived to the ED with bruising noted to R cheek and hematoma noted to posterior head. Pt states she fell \"a couple days ago\". Pt denies any pain. Pt respirations are even and unlabored.       Remedios Haney RN  05/22/23 4067

## 2023-05-22 NOTE — ED PROVIDER NOTES
[facility] [address] [address] EMERGENCY DEPT    Pt Name: Niyah Mckenna  MRN: [id number]  [place] 1940  Date of evaluation: 5/22/2023  Resident Physician: Lyndon Merchant MD  Supervising Physician: Dr. Tonie Carrillo MD    [address] [place] [address]       Chief Complaint   Patient presents with    Fall    Trauma       HISTORY OF PRESENT ILLNESS   Niyah Mckenna is a 79 y.o. female with PMHx of parkinson's disease, traumatic head injury (scalp lac)  who presents to the emergency department for evaluation of fall, trauma. Pt and  at bedside stating that pt falls often. Pt was previously on plavix but has been taken off of it for the past 3 weeks because of her history of frequent falls. She sustained a fall a \"couple nights\" ago. Pt denies any pain anywhere. Today, home health nurse came to the house to evaluate as patient does live at home with her  and not in a nursing home. Home health nurse stated that she should come to the ED for further evaluation due to her recent fall. The patient has no other acute complaints at this time. Review of Systems    Negative Except as Documented Above. PASTMEDICAL HISTORY     Past Medical History:   Diagnosis Date    Anti-phospholipid antibody syndrome ([facility] [address]     Cancer ([facility] [address]     uterine     Memory disorder        Patient Active Problem List   Diagnosis Code    Parkinson's disease ([facility] [address] G20    Traumatic head injury with multiple lacerations, initial encounter S09.90XA, S01. 91XA    Scalp laceration S01. 01XA     SURGICAL HISTORY       Past Surgical History:   Procedure Laterality Date    EYE SURGERY Bilateral     corneal transplants     FACIAL SURGERY N/A 3/26/2023    FACIAL LACERATION REPAIR performed by Jm Pickens MD at [address] [place] [address] (CERVIX STATUS UNKNOWN)         CURRENT MEDICATIONS       Previous Medications    ACETAMINOPHEN (TYLENOL) 500 MG TABLET    Take 500 mg by mouth in the morning and 500 mg before bedtime.     ASPIRIN 81 MG EC

## 2023-05-22 NOTE — H&P
Trauma H & P     Patient:  Tania Santoyo  Admit date: 5/22/2023   YOB: 1940 Date of Evaluation: May 22, 2023  MRN: [id number]  Acct: [de-identified]    Injury Date:05/21/2023  Injury time:1000  PCP: Elvi Javed MD   Assessment/Plan    Chief Complaint: neck pain, lower extremity pain(chronic)  Pt is an 78 y/o female s/p GLF 5/21 who was referred to ED by the visiting nurse. Pt actually fell yesterday morning. Initially it was thought pt was a GLF on thinners but pt stopped taking blood thinners several weeks ago due to history of frequent falls. CTH done by ED revealed focal parenchymal contusion right parietal lobe. CT N negative for acute fracture. CXR negative. Pt has history of parkinsons, dementia and frequent falls. Pt is currently alert and oriented x 1. HDS. Pt c/o neck pain and chronic lower extremity pains.   Activation: []Level I ([place] Alert) []Level II (Injury Call) [x]Level III (Trauma Consult)  Mode of Arrival: EMS transportation  Referring Facility: visiting nurse service  Loss of Consciousness []No []Yes Duration(min)  Mechanism of Injury:  []Motor Vehicle crash   []Single Vehicle [] []Passenger []Scene Fatality []Front Seat  []Restrained   []Air Bag Deployed   []Ejected []Rollover []Pedestrian []Trapped   Type of vehicle:   Protective Devices:   []Motorcycle  Wearing Helmet []Yes []No  []Bicycle  Wearing Helmet []Yes []No  []Fall   Distance   ft   []Assault    Abuse Reported []Yes []No  []Gunshot  []Stabbing  []Work Related  []Burn: []Flame []Scald []Electrical []Chemical []Contact []Inhalation []House Fire  []Other:   Patient Active Problem List   Diagnosis    Parkinson's disease ([facility] [address]    Traumatic head injury with multiple lacerations, initial encounter    Scalp laceration     Subjective   Review of Systems:  All systems were reviewed and negative other than HPI  Toradol [ketorolac tromethamine]  Past Surgical History:   Procedure Laterality Date    EYE SURGERY

## 2023-05-22 NOTE — ED NOTES
This RN attempted to contacted PT spouse for MRI screening without answer. PT unable to answer questions on MRI screening with confidence. PT states she can not remember.       Pati Sanchez RN  05/22/23 1923

## 2023-05-22 NOTE — ED NOTES
Dr. Duke Ballesteros at bedside and states to downgrade patient. Cervical collar applied.       Vaibhav Ribera RN  05/22/23 8532

## 2023-05-23 ENCOUNTER — APPOINTMENT (OUTPATIENT)
Dept: CT IMAGING | Age: 83
End: 2023-05-23
Payer: MEDICARE

## 2023-05-23 LAB
AMPHETAMINES UR QL SCN: NEGATIVE
ANION GAP SERPL CALC-SCNC: 15 MEQ/L (ref 8–16)
BACTERIA URNS QL MICRO: ABNORMAL /HPF
BARBITURATES UR QL SCN: NEGATIVE
BASOPHILS ABSOLUTE: 0.1 THOU/MM3 (ref 0–0.1)
BASOPHILS NFR BLD AUTO: 0.8 %
BENZODIAZ UR QL SCN: NEGATIVE
BILIRUB UR QL STRIP.AUTO: NEGATIVE
BUN SERPL-MCNC: 21 MG/DL (ref 7–22)
BZE UR QL SCN: NEGATIVE
CALCIUM SERPL-MCNC: 8.8 MG/DL (ref 8.5–10.5)
CANNABINOIDS UR QL SCN: NEGATIVE
CASTS #/AREA URNS LPF: ABNORMAL /LPF
CHARACTER UR: ABNORMAL
CHLORIDE SERPL-SCNC: 102 MEQ/L (ref 98–111)
CO2 SERPL-SCNC: 23 MEQ/L (ref 23–33)
COLOR: YELLOW
CREAT SERPL-MCNC: 0.8 MG/DL (ref 0.4–1.2)
CRYSTALS URNS MICRO: ABNORMAL
DEPRECATED RDW RBC AUTO: 49.1 FL (ref 35–45)
EKG ATRIAL RATE: 63 BPM
EKG P AXIS: 66 DEGREES
EKG P-R INTERVAL: 216 MS
EKG Q-T INTERVAL: 444 MS
EKG QRS DURATION: 76 MS
EKG QTC CALCULATION (BAZETT): 454 MS
EKG R AXIS: 27 DEGREES
EKG T AXIS: 44 DEGREES
EKG VENTRICULAR RATE: 63 BPM
EOSINOPHIL NFR BLD AUTO: 2.8 %
EOSINOPHILS ABSOLUTE: 0.2 THOU/MM3 (ref 0–0.4)
EPITHELIAL CELLS, UA: ABNORMAL /HPF
ERYTHROCYTE [DISTWIDTH] IN BLOOD BY AUTOMATED COUNT: 13.9 % (ref 11.5–14.5)
FENTANYL: NEGATIVE
GFR SERPL CREATININE-BSD FRML MDRD: > 60 ML/MIN/1.73M2
GLUCOSE SERPL-MCNC: 107 MG/DL (ref 70–108)
GLUCOSE UR QL STRIP.AUTO: NEGATIVE MG/DL
HCT VFR BLD AUTO: 34.6 % (ref 37–47)
HGB BLD-MCNC: 10.9 GM/DL (ref 12–16)
HGB UR QL STRIP.AUTO: NEGATIVE
IMM GRANULOCYTES # BLD AUTO: 0.01 THOU/MM3 (ref 0–0.07)
IMM GRANULOCYTES NFR BLD AUTO: 0.1 %
KETONES UR QL STRIP.AUTO: ABNORMAL
LYMPHOCYTES ABSOLUTE: 1.2 THOU/MM3 (ref 1–4.8)
LYMPHOCYTES NFR BLD AUTO: 17.2 %
MCH RBC QN AUTO: 30.3 PG (ref 26–33)
MCHC RBC AUTO-ENTMCNC: 31.5 GM/DL (ref 32.2–35.5)
MCV RBC AUTO: 96.1 FL (ref 81–99)
MONOCYTES ABSOLUTE: 0.6 THOU/MM3 (ref 0.4–1.3)
MONOCYTES NFR BLD AUTO: 7.7 %
NEUTROPHILS NFR BLD AUTO: 71.4 %
NITRITE UR QL STRIP: NEGATIVE
NRBC BLD AUTO-RTO: 0 /100 WBC
OPIATES UR QL SCN: NEGATIVE
OSMOLALITY SERPL CALC.SUM OF ELEC: 282.8 MOSMOL/KG (ref 275–300)
OXYCODONE: NEGATIVE
PCP UR QL SCN: NEGATIVE
PH UR STRIP.AUTO: 6 [PH] (ref 5–9)
PLATELET # BLD AUTO: 110 THOU/MM3 (ref 130–400)
PMV BLD AUTO: 11.4 FL (ref 9.4–12.4)
POTASSIUM SERPL-SCNC: 3.2 MEQ/L (ref 3.5–5.2)
PROT UR STRIP.AUTO-MCNC: 30 MG/DL
RBC # BLD AUTO: 3.6 MILL/MM3 (ref 4.2–5.4)
RBC URINE: ABNORMAL /HPF
SEGMENTED NEUTROPHILS ABSOLUTE COUNT: 5.1 THOU/MM3 (ref 1.8–7.7)
SODIUM SERPL-SCNC: 140 MEQ/L (ref 135–145)
SP GR UR REFRACT.AUTO: 1.02 (ref 1–1.03)
UROBILINOGEN, URINE: 1 EU/DL (ref 0–1)
WBC # BLD AUTO: 7.2 THOU/MM3 (ref 4.8–10.8)
WBC #/AREA URNS HPF: ABNORMAL /HPF
WBC #/AREA URNS HPF: NEGATIVE /[HPF]

## 2023-05-23 PROCEDURE — 92610 EVALUATE SWALLOWING FUNCTION: CPT

## 2023-05-23 PROCEDURE — 97535 SELF CARE MNGMENT TRAINING: CPT

## 2023-05-23 PROCEDURE — 6370000000 HC RX 637 (ALT 250 FOR IP): Performed by: SURGERY

## 2023-05-23 PROCEDURE — 2580000003 HC RX 258: Performed by: PHYSICIAN ASSISTANT

## 2023-05-23 PROCEDURE — 97162 PT EVAL MOD COMPLEX 30 MIN: CPT

## 2023-05-23 PROCEDURE — 70450 CT HEAD/BRAIN W/O DYE: CPT

## 2023-05-23 PROCEDURE — 36415 COLL VENOUS BLD VENIPUNCTURE: CPT

## 2023-05-23 PROCEDURE — 6370000000 HC RX 637 (ALT 250 FOR IP): Performed by: NURSE PRACTITIONER

## 2023-05-23 PROCEDURE — 93005 ELECTROCARDIOGRAM TRACING: CPT | Performed by: STUDENT IN AN ORGANIZED HEALTH CARE EDUCATION/TRAINING PROGRAM

## 2023-05-23 PROCEDURE — 93010 ELECTROCARDIOGRAM REPORT: CPT | Performed by: INTERNAL MEDICINE

## 2023-05-23 PROCEDURE — 97110 THERAPEUTIC EXERCISES: CPT

## 2023-05-23 PROCEDURE — 81001 URINALYSIS AUTO W/SCOPE: CPT

## 2023-05-23 PROCEDURE — 2100000000 HC CCU R&B

## 2023-05-23 PROCEDURE — 97530 THERAPEUTIC ACTIVITIES: CPT

## 2023-05-23 PROCEDURE — 80048 BASIC METABOLIC PNL TOTAL CA: CPT

## 2023-05-23 PROCEDURE — 92523 SPEECH SOUND LANG COMPREHEN: CPT

## 2023-05-23 PROCEDURE — 2140000000 HC CCU INTERMEDIATE R&B

## 2023-05-23 PROCEDURE — 97166 OT EVAL MOD COMPLEX 45 MIN: CPT

## 2023-05-23 PROCEDURE — 85025 COMPLETE CBC W/AUTO DIFF WBC: CPT

## 2023-05-23 PROCEDURE — 99233 SBSQ HOSP IP/OBS HIGH 50: CPT | Performed by: INTERNAL MEDICINE

## 2023-05-23 PROCEDURE — 2580000003 HC RX 258: Performed by: STUDENT IN AN ORGANIZED HEALTH CARE EDUCATION/TRAINING PROGRAM

## 2023-05-23 PROCEDURE — 6360000002 HC RX W HCPCS: Performed by: NURSE PRACTITIONER

## 2023-05-23 PROCEDURE — 80307 DRUG TEST PRSMV CHEM ANLYZR: CPT

## 2023-05-23 PROCEDURE — 99232 SBSQ HOSP IP/OBS MODERATE 35: CPT | Performed by: SURGERY

## 2023-05-23 RX ORDER — FLUDROCORTISONE ACETATE 0.1 MG/1
0.1 TABLET ORAL 2 TIMES DAILY
Status: DISCONTINUED | OUTPATIENT
Start: 2023-05-23 | End: 2023-05-26 | Stop reason: HOSPADM

## 2023-05-23 RX ORDER — POLYVINYL ALCOHOL 14 MG/ML
1 SOLUTION/ DROPS OPHTHALMIC 2 TIMES DAILY
Status: DISCONTINUED | OUTPATIENT
Start: 2023-05-23 | End: 2023-05-26 | Stop reason: HOSPADM

## 2023-05-23 RX ORDER — PANTOPRAZOLE SODIUM 40 MG/1
40 TABLET, DELAYED RELEASE ORAL
Status: DISCONTINUED | OUTPATIENT
Start: 2023-05-23 | End: 2023-05-26 | Stop reason: HOSPADM

## 2023-05-23 RX ORDER — ATORVASTATIN CALCIUM 40 MG/1
40 TABLET, FILM COATED ORAL NIGHTLY
Status: DISCONTINUED | OUTPATIENT
Start: 2023-05-23 | End: 2023-05-26 | Stop reason: HOSPADM

## 2023-05-23 RX ORDER — QUETIAPINE FUMARATE 25 MG/1
50 TABLET, FILM COATED ORAL NIGHTLY
Status: DISCONTINUED | OUTPATIENT
Start: 2023-05-23 | End: 2023-05-26 | Stop reason: HOSPADM

## 2023-05-23 RX ORDER — HYDRALAZINE HYDROCHLORIDE 20 MG/ML
10 INJECTION INTRAMUSCULAR; INTRAVENOUS EVERY 6 HOURS PRN
Status: DISCONTINUED | OUTPATIENT
Start: 2023-05-23 | End: 2023-05-26 | Stop reason: HOSPADM

## 2023-05-23 RX ORDER — POTASSIUM CHLORIDE 20 MEQ/1
40 TABLET, EXTENDED RELEASE ORAL PRN
Status: DISCONTINUED | OUTPATIENT
Start: 2023-05-23 | End: 2023-05-26 | Stop reason: HOSPADM

## 2023-05-23 RX ORDER — POTASSIUM CHLORIDE 7.45 MG/ML
10 INJECTION INTRAVENOUS PRN
Status: DISCONTINUED | OUTPATIENT
Start: 2023-05-23 | End: 2023-05-26 | Stop reason: HOSPADM

## 2023-05-23 RX ORDER — PREDNISOLONE ACETATE 10 MG/ML
1 SUSPENSION/ DROPS OPHTHALMIC 4 TIMES DAILY
Status: DISCONTINUED | OUTPATIENT
Start: 2023-05-23 | End: 2023-05-26 | Stop reason: HOSPADM

## 2023-05-23 RX ORDER — QUETIAPINE FUMARATE 25 MG/1
25 TABLET, FILM COATED ORAL EVERY MORNING
Status: DISCONTINUED | OUTPATIENT
Start: 2023-05-23 | End: 2023-05-26 | Stop reason: HOSPADM

## 2023-05-23 RX ORDER — DONEPEZIL HYDROCHLORIDE 10 MG/1
5 TABLET, FILM COATED ORAL NIGHTLY
Status: DISCONTINUED | OUTPATIENT
Start: 2023-05-23 | End: 2023-05-26 | Stop reason: HOSPADM

## 2023-05-23 RX ORDER — CYCLOSPORINE 0.5 MG/ML
1 EMULSION OPHTHALMIC 2 TIMES DAILY
Status: DISCONTINUED | OUTPATIENT
Start: 2023-05-23 | End: 2023-05-23

## 2023-05-23 RX ORDER — 0.9 % SODIUM CHLORIDE 0.9 %
500 INTRAVENOUS SOLUTION INTRAVENOUS ONCE
Status: COMPLETED | OUTPATIENT
Start: 2023-05-24 | End: 2023-05-24

## 2023-05-23 RX ADMIN — PREDNISOLONE ACETATE 1 DROP: 10 SUSPENSION/ DROPS OPHTHALMIC at 20:24

## 2023-05-23 RX ADMIN — PREDNISOLONE ACETATE 1 DROP: 10 SUSPENSION/ DROPS OPHTHALMIC at 14:45

## 2023-05-23 RX ADMIN — SODIUM CHLORIDE 500 ML: 9 INJECTION, SOLUTION INTRAVENOUS at 23:40

## 2023-05-23 RX ADMIN — CARBIDOPA AND LEVODOPA 1.5 TABLET: 25; 100 TABLET ORAL at 10:09

## 2023-05-23 RX ADMIN — CARBIDOPA AND LEVODOPA 1.5 TABLET: 25; 100 TABLET ORAL at 20:24

## 2023-05-23 RX ADMIN — FLUDROCORTISONE ACETATE 0.1 MG: 0.1 TABLET ORAL at 10:09

## 2023-05-23 RX ADMIN — SODIUM CHLORIDE, PRESERVATIVE FREE 10 ML: 5 INJECTION INTRAVENOUS at 20:24

## 2023-05-23 RX ADMIN — HYDRALAZINE HYDROCHLORIDE 10 MG: 20 INJECTION INTRAMUSCULAR; INTRAVENOUS at 08:12

## 2023-05-23 RX ADMIN — BACITRACIN: 500 OINTMENT TOPICAL at 10:09

## 2023-05-23 RX ADMIN — FLUDROCORTISONE ACETATE 0.1 MG: 0.1 TABLET ORAL at 20:24

## 2023-05-23 RX ADMIN — POTASSIUM CHLORIDE 40 MEQ: 1500 TABLET, EXTENDED RELEASE ORAL at 10:09

## 2023-05-23 RX ADMIN — PREDNISOLONE ACETATE 1 DROP: 10 SUSPENSION/ DROPS OPHTHALMIC at 17:34

## 2023-05-23 RX ADMIN — DONEPEZIL HYDROCHLORIDE 5 MG: 10 TABLET, FILM COATED ORAL at 20:24

## 2023-05-23 RX ADMIN — PREDNISOLONE ACETATE 1 DROP: 10 SUSPENSION/ DROPS OPHTHALMIC at 10:16

## 2023-05-23 RX ADMIN — BACITRACIN: 500 OINTMENT TOPICAL at 14:45

## 2023-05-23 RX ADMIN — HYDRALAZINE HYDROCHLORIDE 10 MG: 20 INJECTION INTRAMUSCULAR; INTRAVENOUS at 18:11

## 2023-05-23 RX ADMIN — QUETIAPINE FUMARATE 25 MG: 25 TABLET ORAL at 10:09

## 2023-05-23 RX ADMIN — ATORVASTATIN CALCIUM 40 MG: 40 TABLET, FILM COATED ORAL at 20:24

## 2023-05-23 RX ADMIN — BACITRACIN: 500 OINTMENT TOPICAL at 20:24

## 2023-05-23 RX ADMIN — QUETIAPINE FUMARATE 50 MG: 25 TABLET ORAL at 20:24

## 2023-05-23 RX ADMIN — CARBIDOPA AND LEVODOPA 1.5 TABLET: 25; 100 TABLET ORAL at 14:45

## 2023-05-23 RX ADMIN — POLYVINYL ALCOHOL 1 DROP: 14 SOLUTION/ DROPS OPHTHALMIC at 20:23

## 2023-05-23 RX ADMIN — HYDROCODONE BITARTRATE AND ACETAMINOPHEN 1 TABLET: 5; 325 TABLET ORAL at 01:45

## 2023-05-23 ASSESSMENT — ENCOUNTER SYMPTOMS
COLOR CHANGE: 0
TROUBLE SWALLOWING: 0
SORE THROAT: 0
ABDOMINAL PAIN: 0
NAUSEA: 0
CHEST TIGHTNESS: 0
PHOTOPHOBIA: 0
VOMITING: 0
BACK PAIN: 0
SHORTNESS OF BREATH: 0

## 2023-05-23 ASSESSMENT — PAIN SCALES - GENERAL
PAINLEVEL_OUTOF10: 5
PAINLEVEL_OUTOF10: 2

## 2023-05-23 NOTE — CARE COORDINATION
Case Management Assessment  Initial Evaluation    Date/Time of Evaluation: 5/23/2023 3:11 PM  Assessment Completed by: Pippa Lewis RN    If patient is discharged prior to next notation, then this note serves as note for discharge by case management. Patient Name: Tawana Escobar                   YOB: 1940  Diagnosis: SAH (subarachnoid hemorrhage) ([facility] [address] [I60.9]  Fall, initial encounter [W19. XXXA]  Traumatic intracerebral hemorrhage with loss of consciousness, unspecified laterality, initial encounter [facility]) [O41.787B]                   Date / Time: 5/22/2023  2:17 PM  [facility]     Patient Admission Status: Inpatient   Readmission Risk Low 0-14, Mod 15-19), High > 20: Readmission Risk Score: 19.7    Current PCP: Radha Stratton MD  PCP verified by CM? No (unable)    Chart Reviewed: Yes      History Provided by: Medical Record  Patient Orientation: Alert and Oriented, Person    Patient Cognition: Short Term Memory Deficit    Hospitalization in the last 30 days (Readmission):  No    If yes, Readmission Assessment in CM Navigator will be completed. Advance Directives:      Code Status: Full Code   Patient's Primary Decision Maker is: Legal Next of Kin      Discharge Planning:    Patient lives with: Spouse/Significant Other Type of Home: House  Primary Care Giver: Spouse  Patient Support Systems include: Spouse/Significant Other   Current Financial resources: Medicare  Current community resources: ECF/Home Care  Current services prior to admission: Home Care            Current DME:              Type of Home Care services:  [place], OT, PT, Nursing Services    ADLS  Prior functional level: Assistance with the following:  Current functional level: Assistance with the following:    Family can provide assistance at DC: Yes  Would you like Case Management to discuss the discharge plan with any other family members/significant others, and if so, who?  Yes  Plans to Return to Present

## 2023-05-24 ENCOUNTER — APPOINTMENT (OUTPATIENT)
Dept: GENERAL RADIOLOGY | Age: 83
End: 2023-05-24
Payer: MEDICARE

## 2023-05-24 LAB
EKG ATRIAL RATE: 122 BPM
EKG P AXIS: 22 DEGREES
EKG P-R INTERVAL: 154 MS
EKG Q-T INTERVAL: 316 MS
EKG QRS DURATION: 76 MS
EKG QTC CALCULATION (BAZETT): 450 MS
EKG R AXIS: 68 DEGREES
EKG T AXIS: -22 DEGREES
EKG VENTRICULAR RATE: 122 BPM
POTASSIUM SERPL-SCNC: 3.6 MEQ/L (ref 3.5–5.2)

## 2023-05-24 PROCEDURE — 99231 SBSQ HOSP IP/OBS SF/LOW 25: CPT | Performed by: SURGERY

## 2023-05-24 PROCEDURE — 84132 ASSAY OF SERUM POTASSIUM: CPT

## 2023-05-24 PROCEDURE — 51798 US URINE CAPACITY MEASURE: CPT

## 2023-05-24 PROCEDURE — 99221 1ST HOSP IP/OBS SF/LOW 40: CPT | Performed by: NEUROLOGICAL SURGERY

## 2023-05-24 PROCEDURE — 36415 COLL VENOUS BLD VENIPUNCTURE: CPT

## 2023-05-24 PROCEDURE — 97116 GAIT TRAINING THERAPY: CPT

## 2023-05-24 PROCEDURE — 2580000003 HC RX 258: Performed by: PHYSICIAN ASSISTANT

## 2023-05-24 PROCEDURE — 92526 ORAL FUNCTION THERAPY: CPT

## 2023-05-24 PROCEDURE — 6370000000 HC RX 637 (ALT 250 FOR IP): Performed by: NURSE PRACTITIONER

## 2023-05-24 PROCEDURE — 6370000000 HC RX 637 (ALT 250 FOR IP): Performed by: PHYSICIAN ASSISTANT

## 2023-05-24 PROCEDURE — 97129 THER IVNTJ 1ST 15 MIN: CPT

## 2023-05-24 PROCEDURE — 97110 THERAPEUTIC EXERCISES: CPT

## 2023-05-24 PROCEDURE — 93010 ELECTROCARDIOGRAM REPORT: CPT | Performed by: INTERNAL MEDICINE

## 2023-05-24 PROCEDURE — 2060000000 HC ICU INTERMEDIATE R&B

## 2023-05-24 PROCEDURE — 71045 X-RAY EXAM CHEST 1 VIEW: CPT

## 2023-05-24 PROCEDURE — 97535 SELF CARE MNGMENT TRAINING: CPT

## 2023-05-24 PROCEDURE — 6360000002 HC RX W HCPCS: Performed by: NURSE PRACTITIONER

## 2023-05-24 PROCEDURE — 6370000000 HC RX 637 (ALT 250 FOR IP): Performed by: SURGERY

## 2023-05-24 RX ADMIN — BACITRACIN: 500 OINTMENT TOPICAL at 14:27

## 2023-05-24 RX ADMIN — HYDRALAZINE HYDROCHLORIDE 10 MG: 20 INJECTION INTRAMUSCULAR; INTRAVENOUS at 17:20

## 2023-05-24 RX ADMIN — DONEPEZIL HYDROCHLORIDE 5 MG: 10 TABLET, FILM COATED ORAL at 20:54

## 2023-05-24 RX ADMIN — SODIUM CHLORIDE: 9 INJECTION, SOLUTION INTRAVENOUS at 00:50

## 2023-05-24 RX ADMIN — HYDRALAZINE HYDROCHLORIDE 10 MG: 20 INJECTION INTRAMUSCULAR; INTRAVENOUS at 08:48

## 2023-05-24 RX ADMIN — QUETIAPINE FUMARATE 25 MG: 25 TABLET ORAL at 09:52

## 2023-05-24 RX ADMIN — BACITRACIN: 500 OINTMENT TOPICAL at 22:32

## 2023-05-24 RX ADMIN — PREDNISOLONE ACETATE 1 DROP: 10 SUSPENSION/ DROPS OPHTHALMIC at 14:27

## 2023-05-24 RX ADMIN — SODIUM CHLORIDE: 9 INJECTION, SOLUTION INTRAVENOUS at 22:37

## 2023-05-24 RX ADMIN — PANTOPRAZOLE SODIUM 40 MG: 40 TABLET, DELAYED RELEASE ORAL at 05:46

## 2023-05-24 RX ADMIN — QUETIAPINE FUMARATE 50 MG: 25 TABLET ORAL at 20:53

## 2023-05-24 RX ADMIN — CARBIDOPA AND LEVODOPA 1.5 TABLET: 25; 100 TABLET ORAL at 20:53

## 2023-05-24 RX ADMIN — CARBIDOPA AND LEVODOPA 1.5 TABLET: 25; 100 TABLET ORAL at 14:26

## 2023-05-24 RX ADMIN — PREDNISOLONE ACETATE 1 DROP: 10 SUSPENSION/ DROPS OPHTHALMIC at 22:32

## 2023-05-24 RX ADMIN — ONDANSETRON 4 MG: 4 TABLET, ORALLY DISINTEGRATING ORAL at 20:54

## 2023-05-24 RX ADMIN — FLUDROCORTISONE ACETATE 0.1 MG: 0.1 TABLET ORAL at 20:54

## 2023-05-24 RX ADMIN — FLUDROCORTISONE ACETATE 0.1 MG: 0.1 TABLET ORAL at 09:52

## 2023-05-24 RX ADMIN — ATORVASTATIN CALCIUM 40 MG: 40 TABLET, FILM COATED ORAL at 20:54

## 2023-05-24 RX ADMIN — SODIUM CHLORIDE, PRESERVATIVE FREE 10 ML: 5 INJECTION INTRAVENOUS at 20:58

## 2023-05-24 RX ADMIN — HYDROCODONE BITARTRATE AND ACETAMINOPHEN 2 TABLET: 5; 325 TABLET ORAL at 20:54

## 2023-05-24 RX ADMIN — PREDNISOLONE ACETATE 1 DROP: 10 SUSPENSION/ DROPS OPHTHALMIC at 09:52

## 2023-05-24 RX ADMIN — BACITRACIN: 500 OINTMENT TOPICAL at 09:52

## 2023-05-24 RX ADMIN — POLYVINYL ALCOHOL 1 DROP: 14 SOLUTION/ DROPS OPHTHALMIC at 22:32

## 2023-05-24 RX ADMIN — PREDNISOLONE ACETATE 1 DROP: 10 SUSPENSION/ DROPS OPHTHALMIC at 17:05

## 2023-05-24 RX ADMIN — POLYVINYL ALCOHOL 1 DROP: 14 SOLUTION/ DROPS OPHTHALMIC at 09:52

## 2023-05-24 RX ADMIN — CARBIDOPA AND LEVODOPA 1.5 TABLET: 25; 100 TABLET ORAL at 09:51

## 2023-05-24 ASSESSMENT — PAIN SCALES - GENERAL
PAINLEVEL_OUTOF10: 0

## 2023-05-24 NOTE — PLAN OF CARE
Problem: Discharge Planning  Goal: Discharge to home or other facility with appropriate resources  Outcome: Progressing  Flowsheets  Taken 5/23/2023 2125 by Cristiano Cordero RN  Discharge to home or other facility with appropriate resources:   Identify barriers to discharge with patient and caregiver   Arrange for needed discharge resources and transportation as appropriate   Identify discharge learning needs (meds, wound care, etc)  Taken 5/23/2023 0745 by Pat Peace RN  Discharge to home or other facility with appropriate resources:   Identify barriers to discharge with patient and caregiver   Arrange for needed discharge resources and transportation as appropriate   Identify discharge learning needs (meds, wound care, etc)   Refer to discharge planning if patient needs post-hospital services based on physician order or complex needs related to functional status, cognitive ability or social support system     Problem: Skin/Tissue Integrity  Goal: Absence of new skin breakdown  Description: 1. Monitor for areas of redness and/or skin breakdown  2. Assess vascular access sites hourly  3. Every 4-6 hours minimum:  Change oxygen saturation probe site  4. Every 4-6 hours:  If on nasal continuous positive airway pressure, respiratory therapy assess nares and determine need for appliance change or resting period. Outcome: Progressing  Note: No new skin breakdown. Problem: Confusion  Goal: Confusion, delirium, dementia, or psychosis is improved or at baseline  Description: INTERVENTIONS:  1. Assess for possible contributors to thought disturbance, including medications, impaired vision or hearing, underlying metabolic abnormalities, dehydration, psychiatric diagnoses, and notify attending LIP  2. Romance high risk fall precautions, as indicated  3. Provide frequent short contacts to provide reality reorientation, refocusing and direction  4.  Decrease environmental stimuli, including noise as

## 2023-05-24 NOTE — CARE COORDINATION
5/24/23, 2:38 PM EDT    DISCHARGE ON GOING [address] day: 2  Location: 3A-03/003-A Reason for admit: SAH (subarachnoid hemorrhage) ([facility] [address] [I60.9]  Fall, initial encounter [W19. XXXA]  Traumatic intracerebral hemorrhage with loss of consciousness, unspecified laterality, initial encounter [facility]) [A88.655Z]   Procedure: 5/22 CT Head WO:  Focal parenchymal contusion right parietal lobe. Report called to Dr. Renetta Turner   5/22 CT C Spine: Straightening of the normal cervical lordosis with superimposed degenerative changes especially at C3-4, C4-5, C5-6 and C6-7.   5/22 CXR: No acute cardiopulmonary finding. 5/23 CT Head WO: Stable CT scan of the brain, no interval change since previous study dated 5/22/2023. Barriers to Discharge: [facility] surgery, Neurosurgery, and therapy following. Neurosurgery has documented that there is no need for surgical intervention at this time. IVF. BP fluctuating, 129//109 today. PCP: Natalia Pittman MD  Readmission Risk Score: 20.1%  Patient Goals/Plan/Treatment Preferences: From [facility] with her . Current with [facility]. SW following to assist with discharge planning. CM and SW have been having difficulties connecting with patient's .

## 2023-05-24 NOTE — CARE COORDINATION
5/24/23, 1:39 PM EDT    DISCHARGE PLANNING EVALUATION    DEMETRIO has attempted to reach pts spouse, Ed multiple times today. DEMETRIO has been to the unit twice and called ED and left messages. Nevin present had said Ed was here early this morning and was planning to come back after \"running errands\". DEMETRIO left message to call this SW for discharge planning. DEMETRIO received call from Tevin with [facility]. Tevin stated pt is current with them for RN/PT/OT. Tevin voiced concern for pts safety at home due to multiple falls. DEMETRIO did discuss if pt goes home to add  order for [facility] to assist with long-term planning and goals. DEMETRIO noted that PT had assessed pt 5/23/23, reccommended home with 24 hr assist with therapy. DEMETRIO discussed that pt will require pre-cert for approval to [facility] if agreeable. Pt had recent stay at [place] [address] Unit. Await call back from ED to discuss discharge options.

## 2023-05-25 PROCEDURE — 2060000000 HC ICU INTERMEDIATE R&B

## 2023-05-25 PROCEDURE — 6370000000 HC RX 637 (ALT 250 FOR IP): Performed by: NURSE PRACTITIONER

## 2023-05-25 PROCEDURE — 97112 NEUROMUSCULAR REEDUCATION: CPT

## 2023-05-25 PROCEDURE — 97110 THERAPEUTIC EXERCISES: CPT

## 2023-05-25 PROCEDURE — 99231 SBSQ HOSP IP/OBS SF/LOW 25: CPT | Performed by: SURGERY

## 2023-05-25 PROCEDURE — 6360000002 HC RX W HCPCS: Performed by: NURSE PRACTITIONER

## 2023-05-25 PROCEDURE — 6370000000 HC RX 637 (ALT 250 FOR IP): Performed by: PHYSICIAN ASSISTANT

## 2023-05-25 PROCEDURE — 2580000003 HC RX 258: Performed by: PHYSICIAN ASSISTANT

## 2023-05-25 PROCEDURE — 97116 GAIT TRAINING THERAPY: CPT

## 2023-05-25 RX ADMIN — POLYVINYL ALCOHOL 1 DROP: 14 SOLUTION/ DROPS OPHTHALMIC at 20:25

## 2023-05-25 RX ADMIN — DONEPEZIL HYDROCHLORIDE 5 MG: 10 TABLET, FILM COATED ORAL at 20:19

## 2023-05-25 RX ADMIN — SODIUM CHLORIDE, PRESERVATIVE FREE 10 ML: 5 INJECTION INTRAVENOUS at 09:18

## 2023-05-25 RX ADMIN — QUETIAPINE FUMARATE 50 MG: 25 TABLET ORAL at 20:17

## 2023-05-25 RX ADMIN — QUETIAPINE FUMARATE 25 MG: 25 TABLET ORAL at 09:18

## 2023-05-25 RX ADMIN — FLUDROCORTISONE ACETATE 0.1 MG: 0.1 TABLET ORAL at 09:16

## 2023-05-25 RX ADMIN — SODIUM CHLORIDE, PRESERVATIVE FREE 10 ML: 5 INJECTION INTRAVENOUS at 20:16

## 2023-05-25 RX ADMIN — POLYETHYLENE GLYCOL 3350 17 G: 17 POWDER, FOR SOLUTION ORAL at 09:21

## 2023-05-25 RX ADMIN — CARBIDOPA AND LEVODOPA 1.5 TABLET: 25; 100 TABLET ORAL at 20:16

## 2023-05-25 RX ADMIN — CARBIDOPA AND LEVODOPA 1.5 TABLET: 25; 100 TABLET ORAL at 13:30

## 2023-05-25 RX ADMIN — BACITRACIN: 500 OINTMENT TOPICAL at 09:15

## 2023-05-25 RX ADMIN — PREDNISOLONE ACETATE 1 DROP: 10 SUSPENSION/ DROPS OPHTHALMIC at 09:17

## 2023-05-25 RX ADMIN — BACITRACIN: 500 OINTMENT TOPICAL at 20:16

## 2023-05-25 RX ADMIN — ATORVASTATIN CALCIUM 40 MG: 40 TABLET, FILM COATED ORAL at 20:21

## 2023-05-25 RX ADMIN — PREDNISOLONE ACETATE 1 DROP: 10 SUSPENSION/ DROPS OPHTHALMIC at 18:24

## 2023-05-25 RX ADMIN — POLYVINYL ALCOHOL 1 DROP: 14 SOLUTION/ DROPS OPHTHALMIC at 09:17

## 2023-05-25 RX ADMIN — PREDNISOLONE ACETATE 1 DROP: 10 SUSPENSION/ DROPS OPHTHALMIC at 13:30

## 2023-05-25 RX ADMIN — SODIUM CHLORIDE: 9 INJECTION, SOLUTION INTRAVENOUS at 18:26

## 2023-05-25 RX ADMIN — HYDRALAZINE HYDROCHLORIDE 10 MG: 20 INJECTION INTRAMUSCULAR; INTRAVENOUS at 20:14

## 2023-05-25 RX ADMIN — PREDNISOLONE ACETATE 1 DROP: 10 SUSPENSION/ DROPS OPHTHALMIC at 20:25

## 2023-05-25 RX ADMIN — CARBIDOPA AND LEVODOPA 1.5 TABLET: 25; 100 TABLET ORAL at 09:16

## 2023-05-25 RX ADMIN — FLUDROCORTISONE ACETATE 0.1 MG: 0.1 TABLET ORAL at 20:21

## 2023-05-25 RX ADMIN — BACITRACIN: 500 OINTMENT TOPICAL at 13:29

## 2023-05-25 ASSESSMENT — PAIN SCALES - GENERAL: PAINLEVEL_OUTOF10: 0

## 2023-05-25 NOTE — CONSULTS
Gabriela Fontana   MRN: [id number]  : 1940  NEUROSURGEON: Chanda Bee, DO            HISTORY  Patient is a 79-year-old female who fell from a standing height at home 48 hours prior to admission and she had a headache and subsequent work-up was found to have subarachnoid hemorrhage on CT scan The patient complains of headache in the back of her head. T.   History was obtained from emergency physicians, chart review, nursing staff, and the patient. PAST MEDICAL HISTORY:   Per the ER record  INPATIENT MEDICATIONS  Per the inpatient record  ALLERGIES: No known drug allergies    REVIEW OF SYSTEMS  The review of systems is otherwise negative    PHYSICAL EXAMINATION      VITAL SIGNS  @FLOWDT(6:last)@ @FLOWSTATM(6:24)@ @FLOWDT(5:last)@ @FLOWDT(8:last)@ @FLOWDT(9:last)@ @FLOWDT(10:last)@   [unfilled]  @FLOWDT(14:last)@      The patient provides fair history. General: Alert  Body habitus: Cachectic  Affect: Flat  Cranial Nerves:  II-XII intact   Eyes: PERRLA, EOMI   Vision: Full to confrontational  Field testing   Speech:  Fluent   Head:  Normocephalic atraumatic  Neck: Supple with no pain to palpation or with range of motion  Motor:  No pronator drift     Left Right   Deltoid 5 5   Biceps 5 5   Triceps 5 5   Wrist Flexion 5 5   Wrist Extension 5 5    5 5      Left Right   Hip Flexion 5 5   Knee Extension 5 5   Knee Flexion 5 5   Dorsiflexion 5 5   EHL Flexion 5 5   Plantarflexion 5 5     Sensory:intact to light touch  . DTR:  2+ . Dyer:negative    Babinski: negative   Cardiovascular:RRR   Pulmonary: CTA  Abdomen:Soft, non tender    Musculoskeletal: no step- off, deformity or midline tenderness       Radiographic Imaging   The following images have been reviewed by me personally . CT HEAD W/O CONTRAST  . There is age appropriate hydrocephalus exvacuo. There is a small punctate right parietal intraparenchymal hematoma .  There is no mass effect or midline shift      Repeat CT scan is
No need for neurosurgical intervention at this time.   Please call us as needed
include procedure. Time does include my direct assessment of the patient and coordination of care. Time represents more than 50% of the time involved with patient care by the [address] [place] [address] team.  Electronically signed by Marco Tinajero.  Mariano Scott MD.

## 2023-05-25 NOTE — CARE COORDINATION
5/25/23, 11:25 AM EDT    Patient goals/plan/ treatment preferences discussed by  and . Patient goals/plan/ treatment preferences reviewed with patient/ family. Patient/ family verbalize understanding of discharge plan and are in agreement with goal/plan/treatment preferences. Understanding was demonstrated using the teach back method. AVS provided by RN at time of discharge, which includes all necessary medical information pertaining to the patients current course of illness, treatment, post-discharge goals of care, and treatment preferences. Services At/After Discharge: Home Health, Nursing service, OT, and PT       IMM Letter  IMM Letter given to Patient/Family/Significant other/Guardian/POA/by[de-identified] CM  IMM Letter date given[de-identified] 05/25/23  IMM Letter time given[de-identified] 200     Met with patient and her spouse. He would like to take her home at discharge with current [facility] for RN, PT, OT and SW services. Spoke with Guerline at the agency and made her aware patient will be discharged today. Asked Tri RN to get new [facility] orders. Spouse Ed told SW that he feels he can manage her care at home. He is going to look into finding someone to come stay with her while he runs errands. Gave him a list of private duty agencies. He told SW that he has a son in [facility] and he has very supportive neighbors. Received a call from Ann Diamond at [facility]. They are concerned about Marielena Gabriel returning home at discharge. They will contact her spouse Ed. Planning on discharge to home today. Update 1:10 pm: Received a call from Ann Diamond at [facility]. They are not taking Rebecca Orellana back as they don't feel she is safe at home as she has had several falls. They are not telling the spouse to let him know. SW called spouse Ed to make him aware. Asked him several times if he thinks he can meet her needs at home. He told SW that he can. He does not want her to go to a nursing facility.   He

## 2023-05-25 NOTE — FLOWSHEET NOTE
05/25/23 1532   Safe Environment   Safety Measures Other (comment)  (this vn previously attempted to complete remaining admission required documents and at this time , pt unable to answer questions , no family in the room.)

## 2023-05-25 NOTE — PLAN OF CARE
Problem: Discharge Planning  Goal: Discharge to home or other facility with appropriate resources  Outcome: Progressing  Flowsheets (Taken 5/25/2023 0912)  Discharge to home or other facility with appropriate resources: Identify barriers to discharge with patient and caregiver     Problem: Skin/Tissue Integrity  Goal: Absence of new skin breakdown  Description: 1. Monitor for areas of redness and/or skin breakdown  2. Assess vascular access sites hourly  3. Every 4-6 hours minimum:  Change oxygen saturation probe site  4. Every 4-6 hours:  If on nasal continuous positive airway pressure, respiratory therapy assess nares and determine need for appliance change or resting period. Outcome: Progressing     Problem: Confusion  Goal: Confusion, delirium, dementia, or psychosis is improved or at baseline  Description: INTERVENTIONS:  1. Assess for possible contributors to thought disturbance, including medications, impaired vision or hearing, underlying metabolic abnormalities, dehydration, psychiatric diagnoses, and notify attending LIP  2. Outing high risk fall precautions, as indicated  3. Provide frequent short contacts to provide reality reorientation, refocusing and direction  4. Decrease environmental stimuli, including noise as appropriate  5. Monitor and intervene to maintain adequate nutrition, hydration, elimination, sleep and activity  6. If unable to ensure safety without constant attention obtain sitter and review sitter guidelines with assigned personnel  7.  Initiate Psychosocial CNS and Spiritual Care consult, as indicated  Outcome: Progressing  Flowsheets (Taken 5/25/2023 0912)  Effect of thought disturbance (confusion, delirium, dementia, or psychosis) are managed with adequate functional status: Assess for contributors to thought disturbance, including medications, impaired vision or hearing, underlying metabolic abnormalities, dehydration, psychiatric diagnoses, notify LIP     Problem: Safety

## 2023-05-26 VITALS
WEIGHT: 148.15 LBS | OXYGEN SATURATION: 96 % | SYSTOLIC BLOOD PRESSURE: 127 MMHG | HEART RATE: 96 BPM | BODY MASS INDEX: 23.25 KG/M2 | DIASTOLIC BLOOD PRESSURE: 81 MMHG | HEIGHT: 67 IN | TEMPERATURE: 98.2 F | RESPIRATION RATE: 18 BRPM

## 2023-05-26 PROCEDURE — 2580000003 HC RX 258: Performed by: PHYSICIAN ASSISTANT

## 2023-05-26 PROCEDURE — 99231 SBSQ HOSP IP/OBS SF/LOW 25: CPT | Performed by: SURGERY

## 2023-05-26 PROCEDURE — 6370000000 HC RX 637 (ALT 250 FOR IP): Performed by: PHYSICIAN ASSISTANT

## 2023-05-26 PROCEDURE — 6370000000 HC RX 637 (ALT 250 FOR IP): Performed by: NURSE PRACTITIONER

## 2023-05-26 PROCEDURE — 97535 SELF CARE MNGMENT TRAINING: CPT

## 2023-05-26 RX ADMIN — CARBIDOPA AND LEVODOPA 1.5 TABLET: 25; 100 TABLET ORAL at 08:48

## 2023-05-26 RX ADMIN — FLUDROCORTISONE ACETATE 0.1 MG: 0.1 TABLET ORAL at 08:48

## 2023-05-26 RX ADMIN — POLYETHYLENE GLYCOL 3350 17 G: 17 POWDER, FOR SOLUTION ORAL at 08:48

## 2023-05-26 RX ADMIN — BACITRACIN: 500 OINTMENT TOPICAL at 08:49

## 2023-05-26 RX ADMIN — POLYVINYL ALCOHOL 1 DROP: 14 SOLUTION/ DROPS OPHTHALMIC at 08:49

## 2023-05-26 RX ADMIN — PREDNISOLONE ACETATE 1 DROP: 10 SUSPENSION/ DROPS OPHTHALMIC at 08:49

## 2023-05-26 RX ADMIN — SODIUM CHLORIDE: 9 INJECTION, SOLUTION INTRAVENOUS at 08:48

## 2023-05-26 RX ADMIN — QUETIAPINE FUMARATE 25 MG: 25 TABLET ORAL at 08:48

## 2023-05-26 ASSESSMENT — LIFESTYLE VARIABLES
HOW OFTEN DO YOU HAVE A DRINK CONTAINING ALCOHOL: NEVER
HOW MANY STANDARD DRINKS CONTAINING ALCOHOL DO YOU HAVE ON A TYPICAL DAY: PATIENT DOES NOT DRINK

## 2023-05-26 NOTE — INTERDISCIPLINARY ROUNDS
Patient was seen and examined waiting for discharge patient is able to start aspirin and Plavix I would recommend she follow-up in the office in 2 weeks with a CT scan of the head without contrast

## 2023-05-26 NOTE — PLAN OF CARE
Problem: Discharge Planning  Goal: Discharge to home or other facility with appropriate resources  5/26/2023 1213 by Brianda Gonzalez RN  Outcome: Completed  5/25/2023 2221 by Latisha Macias RN  Outcome: Progressing  Flowsheets (Taken 5/25/2023 2221)  Discharge to home or other facility with appropriate resources: Identify barriers to discharge with patient and caregiver     Problem: Skin/Tissue Integrity  Goal: Absence of new skin breakdown  Description: 1. Monitor for areas of redness and/or skin breakdown  2. Assess vascular access sites hourly  3. Every 4-6 hours minimum:  Change oxygen saturation probe site  4. Every 4-6 hours:  If on nasal continuous positive airway pressure, respiratory therapy assess nares and determine need for appliance change or resting period. 5/26/2023 1213 by Brianda Gonzalez RN  Outcome: Completed  5/25/2023 2221 by Latisha Macias RN  Outcome: Progressing     Problem: Confusion  Goal: Confusion, delirium, dementia, or psychosis is improved or at baseline  Description: INTERVENTIONS:  1. Assess for possible contributors to thought disturbance, including medications, impaired vision or hearing, underlying metabolic abnormalities, dehydration, psychiatric diagnoses, and notify attending LIP  2. [place] high risk fall precautions, as indicated  3. Provide frequent short contacts to provide reality reorientation, refocusing and direction  4. Decrease environmental stimuli, including noise as appropriate  5. Monitor and intervene to maintain adequate nutrition, hydration, elimination, sleep and activity  6. If unable to ensure safety without constant attention obtain sitter and review sitter guidelines with assigned personnel  7.  Initiate Psychosocial CNS and Spiritual Care consult, as indicated  5/26/2023 1213 by Brianda Gonzalez RN  Outcome: Completed  5/25/2023 2221 by Latisha Macias RN  Outcome: Progressing  Flowsheets (Taken 5/25/2023 0912 by Radhika Issa RN)  Effect of thought

## 2023-05-26 NOTE — PLAN OF CARE
Problem: Discharge Planning  Goal: Discharge to home or other facility with appropriate resources  5/25/2023 2221 by Robert Swan RN  Outcome: Progressing  Flowsheets (Taken 5/25/2023 2221)  Discharge to home or other facility with appropriate resources: Identify barriers to discharge with patient and caregiver     Problem: Skin/Tissue Integrity  Goal: Absence of new skin breakdown  Description: 1. Monitor for areas of redness and/or skin breakdown  2. Assess vascular access sites hourly  3. Every 4-6 hours minimum:  Change oxygen saturation probe site  4. Every 4-6 hours:  If on nasal continuous positive airway pressure, respiratory therapy assess nares and determine need for appliance change or resting period. 5/25/2023 2221 by Robert Swan RN  Outcome: Progressing     Problem: Confusion  Goal: Confusion, delirium, dementia, or psychosis is improved or at baseline  Description: INTERVENTIONS:  1. Assess for possible contributors to thought disturbance, including medications, impaired vision or hearing, underlying metabolic abnormalities, dehydration, psychiatric diagnoses, and notify attending LIP  2. [place] high risk fall precautions, as indicated  3. Provide frequent short contacts to provide reality reorientation, refocusing and direction  4. Decrease environmental stimuli, including noise as appropriate  5. Monitor and intervene to maintain adequate nutrition, hydration, elimination, sleep and activity  6. If unable to ensure safety without constant attention obtain sitter and review sitter guidelines with assigned personnel  7.  Initiate Psychosocial CNS and Spiritual Care consult, as indicated  5/25/2023 2221 by Robert Swna RN  Outcome: Progressing  Effect of thought disturbance (confusion, delirium, dementia, or psychosis) are managed with adequate functional status: Assess for contributors to thought disturbance, including medications, impaired vision or hearing, underlying metabolic

## 2023-05-26 NOTE — PROGRESS NOTES
[address] [facility]     Neurosurgery Progress Note  2023  Patient Name: Ginger Ibanez : 1940       Assessment:   Traumatic intracerebral hemorrhage repeat CT scan is stable  Agitation-likely from sundowning    Plan:  No need for neurosurgical intervention at this time    Subjective:     ROS all other review systems are negative    Objective:      /69   Pulse (!) 105   Temp 97.8 °F (36.6 °C) (Oral)   Resp 15   Ht 5' 7\" (1.702 m)   Wt 137 lb (62.1 kg)   SpO2 98%   BMI 21.46 kg/m²   Neuro:General  Appearance: NAD  LOC: Awake, Alert,  A&O x 3  Speech : Fluent & Age appropriate, recent   Memory: Recent and remote intact   CN's : PEERLA, EOMI, Face symmetric, uvula& tongue is midline  Strength: .  5/5 throughout  All extremities    Motor/coordination: No pronator drift/ no dysdiadokokinsia    Muscle tone/ Extremities: No dysmetria, Edema, cyanosis or clubbing    Sensation : intact to light touch  Reflexes:  2+ throughout both UE/LE  Cerebellum / Gait: not tested at this time    Wound: Clean, dry, intact with dressing in place    @[facility] [email or web address]  Lab Results   Component Value Date/Time     2023 03:20 AM    K 3.6 2023 08:17 AM    K 3.5 2023 10:42 AM     2023 03:20 AM    CO2 23 2023 03:20 AM    BUN 21 2023 03:20 AM    CREATININE 0.8 2023 03:20 AM    GLUCOSE 107 2023 03:20 AM    GLUCOSE 105 2023 01:38 PM    CALCIUM 8.8 2023 03:20 AM    LABGLOM >60 2023 03:20 AM        Imaging:  None pending    Electronically signed by: Felice Flood DO 2023 11:01 AM traumatic parenchymal hemorrhage right
[address] THERAPY  [facility] CCU 3A  DAILY NOTE    TIME   SLP Individual Minutes  Time In: 1120  Time Out: 1137  Minutes: 17  Timed Code Treatment Minutes: 9 Minutes   Dysphagia therapy: 8 minutes   Cognitive therapy: 9 minutes     Date: 2023  Patient Name: Kwaku Stone      CSN: [id number]   : 1940  (79 y.o.)  Gender: female   Referring Physician:  Ammon Kawasaki, PA  Diagnosis: SAH   Precautions: Fall risk, aspiration precautions, low stimulation protocol   Current Diet: Soft/bite sized and thin liquids   Swallowing Strategies:  Full Upright Position, Small Bite/Sip, Pulmonary Monitoring, Oral Care after all Meals, Medications Whole with Puree, Alternate Solids and Liquids, Limit Distractions, and Monitor for Fatigue  Date of Last MBS/FEES: Not Applicable    Pain:  No pain reported. Subjective:  Session approved by RN, Low Hernández. Patient seen resting in bed. Easily awakened and alert/cooperative throughout. Absence of agitation at date. Presence of live sitter. Short-Term Goals:  SHORT TERM GOAL #1:  Goal 1: Patient will consume soft and bite size diet (advanced texture trials as clinically indicated), thin liquids without pulmonary compromise and with implementation of identified compensatory strategies to assist with nutrition/hydration measures. INTERVENTIONS: Advanced PO trials of crackers and sips of thin liquids via cup. Patient demonstration of impulsivity with continuous shoveling of PO with fast rate of intake. Required max verbal cues to SLOW DOWN and clear oral cavity prior to continued intake. Patient with coughing while masticating s/t overstuffing of oral cavity. Additional coughing x1 with mixed bolus and use of liquid wash to clear stasis. Required MULTIPLE liquid washes to clear moderate oral stasis to follow. Recommendations to resume soft/bite sized diet and thin liquids. Patient not appropriate for further advancement at this time.      SHORT TERM
[address]  Occupational Therapy  Daily Note  Time:   Time In: 8345  Time Out: 1113  Timed Code Treatment Minutes: 31 Minutes  Minutes: 31          Date: 2023  Patient Name: Asha Salvador,   Gender: female      Room: -14/014-A  MRN: [id number]  : 1940  (79 y.o.)  Referring Practitioner: MICHELE Stiles  Diagnosis: [facility]  Additional Pertinent Hx: Per ER note 2023:83 y.o. female with PMHx of parkinson's disease, traumatic head injury (scalp lac)  who presents to the emergency department for evaluation of fall, trauma. Pt and  at bedside stating that pt falls often. Pt was previously on plavix but has been taken off of it for the past 3 weeks because of her history of frequent falls. She sustained a fall a \"couple nights\" ago. Pt denies any pain anywhere. Today, home health nurse came to the house to evaluate as patient does live at home with her  and not in a nursing home. Per CT of head:Focal parenchymal contusion right parietal lobe (Repeat was stable on )    Restrictions/Precautions:  Restrictions/Precautions: Fall Risk, General Precautions  Position Activity Restriction  Other position/activity restrictions: hx Parkinson's ds, impulsive     SUBJECTIVE: Patient seated in bedside chair upon arrival; agreeable to therapy this date. Patient states \"I do not feel confident when I get up to walk\" \"I am scared to fall\". Patient pleasant and cooperative    PAIN: 0/10:     Vitals: Vitals not assessed per clinical judgement, see nursing flowsheet    COGNITION: Decreased Insight, Decreased Problem Solving, and Decreased Safety Awareness    ADL:   Upper Extremity Dressing: Minimal Assistance. Assistance to clasp bra, matthieu tshirt/sweatshirt  Lower Extremity Dressing: Moderate Assistance and X 1.   Thread B LE into brief, pants, management of brief  Footwear Management: Stand By Assistance, X 1, with set-up, with verbal cues , and with increased
[address] [facility] THERAPY  [facility] CCU 3A  Speech - Language - Cognitive Evaluation + Clinical Swallow Evaluation    SLP Individual Minutes  Time In: 0845  Time Out: 5429  Minutes: 21  Timed Code Treatment Minutes: 0 Minutes     Speech, Language, Cognitive Evaluation: 13 minutes  Clinical Swallow Evaluation: 8 minutes    Date: 2023  Patient Name: Page Ingram      CSN: [id number]   : 1940  (79 y.o.)  Gender: female   Referring Physician:  MICHELE Lorenz  Diagnosis: SAH  Precautions: Fall risk, aspiration precautions  History of Present Illness/Injury: Patient admitted to [facility] with above med dx; please refer to physician H&P for full details. Per chart review, \"78 y/o female s/p GLF  who was referred to ED by the visiting nurse. Pt actually fell yesterday morning. Initially it was thought pt was a GLF on thinners but pt stopped taking blood thinners several weeks ago due to history of frequent falls. CTH done by ED revealed focal parenchymal contusion right parietal lobe. CT N negative for acute fracture. CXR negative. Pt has history of parkinsons, dementia and frequent falls. Pt is currently alert and oriented x 1. HDS. Pt c/o neck pain and chronic lower extremity pains. \"    ST consulted to complete clinical swallow evaluation and cognitive linguistic assessment s/t admitting dx to develop goals per POC as indicated. Past Medical History:   Diagnosis Date    Anti-phospholipid antibody syndrome ([facility] [address]     Cancer ([facility] [address]     uterine     Memory disorder        Pain: No pain reported. Subjective:  RN Gaston Plane with approval to proceed with evaluations. Upon arrival, patient resting in bed, awake and alert. Variable affect with worsening agitation upon spouse's arrival, ?over-stimulation. Poor insight into pathological deficits presenting. Baseline diet of regular solids/thin liquids.      SOCIAL HISTORY:   Living Arrangements: [place] with spouse+son; family in
[address] Shahab Tan PA-C   for Dr. Charlotte Wall  Daily Progress Note    Pt Name: Sonia Choe Record Number: [id number]  Date of Birth 1940   Today's Date: 5/23/2023    HD: # 1   CC: Headache, fall at home, SCARLETT      ASSESSMENT  1. Active Hospital Problems    Diagnosis Date Noted    SAH (subarachnoid hemorrhage) ([facility] [address] [I60.9] 05/22/2023    Traumatic intracerebral hemorrhage with loss of consciousness ([facility] [address] [S92.147D] 05/22/2023    Fall [W19. XXXA] 05/22/2023         PLAN  Admit to ICU under Trauma Services           -Fall precautions          -aspiration precautions          -tele monitor           -5/23 ok to transfer to stepdown           Trauma by Fall            -Bedrest           -5/23 ok to remove C collar per neurosurgery. -PT/OT/SLP to eval and treat when appropriate     Subarachnoid Hemorrhage            -Bedrest           -fall precautions            -5/23 repeat CT no change     History of Dementia, Parkinson's          -continue home meds           -Fall precautions, aspiration precautions           -5/23 OT order for eval and treatment placed     Hypokalemia            -replace by protocol             -repeat BMP in am    IVF prn  Pain meds prn  Bedrest  NV checks   Discharge disposition pending clinical course   Prophylaxis: IS, C&DB, Pepcid, stool softeners, SCDs    SUBJECTIVE  Pt is pleasantly confused. Unsure of last name, told me maiden name. Aware she is in hospital. Taking oral meds and resting. Complains of body aches and being tired. On room air 98% SpO2. Moves all four extremities well, good strength. Right orbital edema and bruising noted. Awaiting PT/OT recommendations for discharge planning.     Wt Readings from Last 3 Encounters:   05/22/23 131 lb 1.6 oz (59.5 kg)   05/15/23 147 lb (66.7 kg)   04/13/23 162 lb 11.2 oz (73.8 kg)     Temp Readings from Last 3 Encounters:   05/23/23 97.8 °F (36.6 °C) (Oral)   05/15/23
[address] CCU 3A  Occupational Therapy  Daily Note  Time:    Time In:   Time Out:   Timed Code Treatment Minutes: 29 Minutes  Minutes: 29          Date: 2023  Patient Name: Kai Frausto,   Gender: female      Room: [facility]003-A  MRN: [id number]  : 1940  (79 y.o.)  Referring Practitioner: MICHELE Leiva Cea  Diagnosis: [facility]  Additional Pertinent Hx: Per ER note 2023:83 y.o. female with PMHx of parkinson's disease, traumatic head injury (scalp lac)  who presents to the emergency department for evaluation of fall, trauma. Pt and  at bedside stating that pt falls often. Pt was previously on plavix but has been taken off of it for the past 3 weeks because of her history of frequent falls. She sustained a fall a \"couple nights\" ago. Pt denies any pain anywhere. Today, home health nurse came to the house to evaluate as patient does live at home with her  and not in a nursing home. Per CT of head:Focal parenchymal contusion right parietal lobe (Repeat was stable on )    Restrictions/Precautions:  Restrictions/Precautions: Fall Risk, General Precautions  Position Activity Restriction  Other position/activity restrictions: hx Parkinson's ds, impulsive      SUBJECTIVE: Pt seated on BSC with RN present. Pt fatigued quickly this date. Two episodes of dizziness, first in stdg for rylie-area care & second after 2 mins supine in bed, both resolved quickly, RN notified & in room at end of session.  in room throughout tx session. PAIN: no c/o pain    Vitals: Blood Pressure: 116/69 supine in bed at end of session, following dizziness episode  Oxygen: 100% on RA    COGNITION: Slow Processing, Decreased Recall, Decreased Insight, Impaired Memory, Decreased Problem Solving, and Decreased Safety Awareness    ADL:   Footwear Management: Dependent. To don slipper socks  Toileting: Maximum Assistance. For rylie-area care and brief mgmt.     Toilet Transfer:
Chart review for SBIRT per trauma service. Per Epic, \"patient being too confused to answer questions\". Unable to complete at this time. TUAN to continue to monitor for completion.
Discharge teaching and instructions for diagnosis/procedure of SAH completed with patient and  using teachback method. AVS reviewed. Patient and  voiced understanding regarding prescriptions, follow up appointments, and care of self at home. Discharged in a wheelchair to  home with support per family.
[address] CCU 3A  EVALUATION    Time:    Time In: 5473  Time Out: 1418  Timed Code Treatment Minutes: 25 Minutes  Minutes: 35          Date: 2023  Patient Name: Asha Salvador,   Gender: female      MRN: [id number]  : 1940  (79 y.o.)  Referring Practitioner: MICHELE Stiles  Diagnosis: [facility]  Additional Pertinent Hx: Per ER note 2023:83 y.o. female with PMHx of parkinson's disease, traumatic head injury (scalp lac)  who presents to the emergency department for evaluation of fall, trauma. Pt and  at bedside stating that pt falls often. Pt was previously on plavix but has been taken off of it for the past 3 weeks because of her history of frequent falls. She sustained a fall a \"couple nights\" ago. Pt denies any pain anywhere. Today, home health nurse came to the house to evaluate as patient does live at home with her  and not in a nursing home. Per CT of head:Focal parenchymal contusion right parietal lobe (Repeat was stable on )    Restrictions/Precautions:  Restrictions/Precautions: Fall Risk  Position Activity Restriction  Other position/activity restrictions: hx Parkinson's ds, impulsive    Subjective  Chart Reviewed: Yes, Orders, Progress Notes, History and Physical  Patient assessed for rehabilitation services?: Yes  Family / Caregiver Present: No    Subjective: cooperative    Pain: 0/10: no c/o pain during session    Vitals: Vitals not assessed per clinical judgement, see nursing flowsheet    Social/Functional History:  Lives With: Spouse                      Additional Comments:  not present for session, per chart Pt from home with spouse with hx of frequent falls. Pt was too confused to give reliable info about home.     VISION:WFL    HEARING:  WFL    COGNITION: Decreased Insight, Decreased Problem Solving, Decreased Safety Awareness, and decreased STM    RANGE OF MOTION:  L shoulder flexion limited 80
Padmini Brown   Daily Progress Note    Pt Name: Jordon Cohen  Medical Record Number: [id number]  Date of Birth 1940   Today's Date: 5/26/2023    HD: # 4   CC: disoriented and confused, but no complaints    ASSESSMENT  1. Active Hospital Problems    Diagnosis Date Noted    SAH (subarachnoid hemorrhage) ([facility] [address] [I60.9] 05/22/2023    Traumatic intracerebral hemorrhage with loss of consciousness ([facility] [address] [C85.972X] 05/22/2023    Fall [W19. XXXA] 05/22/2023         PLAN  Admit to ICU under Trauma Services   -tele monitor   -5/23 ok to transfer to stepdown              -5/26 discharge home today with  and [address] by Fall   -Fall precautions  -5/23 ok to remove C collar per NS  -PT/OT/SLP to eval and treat when appropriate, up with assist              - 5/26 Home with Home Health PT, OT, SW, aid and RN    Subarachnoid Hemorrhage   -Consult to neurosurgery    -Regular neuro checks   -Maintain systolic blood pressure between 100-160   -Elevate head of bed approximately 30 degrees   -Hold all blood thinning and antiplatelet medications   -Repeat head CT in a.m.   -Seizure precautions   -limited brain stimulation guidelines    -5/23 repeat CT no change, ok for dc from NS, f/u in 4 weeks with repeat head CT              -5/26  No ASA no Plavix at discharge     Hypokalemia            -replace by protocol             -repeat BMP in am    History of Dementia, Parkinson's  -Fall precautions, aspiration precautions   -consult intensivist for med mgmt     IVF prn  Dysphagia Diet - aspiration precautions   Pain meds prn - Norco, Tylenol   Prophylaxis: IS, C&DB, Pepcid, stool softeners, SCDs    Discharge disposition pending clinical course    -5/23 PT rec cont therapy and 24 hr assist, OT rec SNF   -5/24 social work following, appreciate assist. Await precert for ECF              -5/26 discharge today with  to home with [address]  Patient seen on 4A
Per epic pt remains disoriented, unable to complete sbirt per trauma services. TUAN to continue monitoring for completion.
Pharmacy Medication History Note      List of current medications patient is taking is complete. Source of information: patient and surescripts    Changes made to medication list:  Medications removed (include reason, ex. therapy complete or physician discontinued):  Cephalexin 500 mg- therapy complete  Ferrous sulfate- therapy complete      Medications added/doses adjusted: Added potassium 10 mEq- take 1 capsule by mouth daily  Adjusted fludrocortisone from 0.1 mg daily to BID  Adjusted midodrine 5 mg to 10 mg by mouth TID    Other notes (ex. Recent course of antibiotics, Coumadin dosing): Uncertain in patient is still taking Restasis, Hypromellose 0.3%, Pred Forte 1% or melatonin  Patient not reliable historian at the moment  Denies use of other OTC or herbal medications.       Allergies reviewed      Electronically signed by TROY Morris [facility] on 5/22/2023 at 8:01 PM
Pt was with a sitter at the time of the visit. She was dealing with subarachnoid hemorrhage. Prayer was appreciated. She was encoura   05/24/23 9764   Encounter Summary   Encounter Overview/Reason  Initial Encounter   Service Provided For: Patient   Referral/Consult From: Rounding   Last Encounter  05/24/23   Complexity of Encounter Low   Begin Time 1531   End Time  1536   Total Time Calculated 5 min   Spiritual/Emotional needs   Type Spiritual Support   Assessment/Intervention/Outcome   Assessment Anxious   Intervention Empowerment     ged.
Report called to [employer] on 4a. Patient taken per transport to 4A. Placed on tele monitor at transfer.
SBIRT screening completed per trauma protocol. SBIRT Findings are Negative. Patient denies alcohol and/or drug use. Also denies depressive symptoms.     Brief Intervention and Referral to Treatment Summary N/A    Patient was provided PHQ-9, AUDIT-C and DAST Screening:      PHQ-9 Score:  Negative  AUDIT-C Score:  Negative  DAST Score:   Negative    Patients substance use is considered-Negative    Low Risk/Healthy  Moderate Risk  Harmful  Dependent    Patients depression is considered:-Negative    Minimal  Mild   Moderate  Moderately Severe  Severe    Brief Education Was Provided N/A    Patient was receptive  Patient was not receptive      Brief Intervention Is Provided (Only for AUDIT-C or DAST) N/A    Patient reports readiness to decrease and/or stop use and a plan was discussed   Patient denies readiness to decrease and/or stop use and a plan was not discussed      Recommendations/Referrals for Brief and/or Specialized Treatment Provided to Patient  N/A
This Virtual RN attempted to complete missing admission documents patient is resting with eyes closed at this time.
Unable to complete MRI until screening form is filled out by family member due to patient being too confused to answer questions.
[facility] she demonstrated intermittent confusion throughout session     PAIN: 0/10:       Vitals: Vitals not assessed per clinical judgement, see nursing flowsheet    OBJECTIVE:  Bed Mobility:  Rolling to Left: Contact Guard Assistance, without rail, with increased time for completion   Supine to Sit: Minimal Assistance, with head of bed flat, without rail, with increased time for completion  Scooting: Contact Guard Assistance    Transfers:  Sit to Stand: [address], from bed and chair and min assist from low toilet - pt required cues for safe hand placement   Stand to [facility], to min assist with cues for safe hand placement and to control descend     Ambulation:  [address], to min assist   Distance: 15x2  Surface: Level Tile  Device:Rolling Walker  Gait Deviations:  noted slow stacey with flexed posture needing verbal cues and tactile cues for posture, pt needed cues and on occ assist at walker to keep it closer to her - she demonstrated uneven step length but fair base of support decreased step length at right vs left- pt did struggle with walker management in narrow spaces and needed extra cues     Balance:  Standing to complete toileting task pt needed CGA for balance and cues for sequencing of task she also required physical assist for clothing management after extra time given   Stad[place] w/ one UE at support pt completed reaching task above shoulder and to knee ht and to the right and left ~ 2 in with CGA noted fairly normal base of support but with trunk sway at times, then progressed same activity w/o UE at support noted increased trunk sway and she did widen her base of support still CGA   Exercise:  Patient was guided in 1 set(s) 10 reps of exercise to both lower extremities. Sitting LE ex, also completed scap squeeze, shoulder rolls post, upper trunk rotations and working on trunk extension while in standing position  .   Exercises were
of baseline dementia and a recent stay on a memory care unit. Patient stable from a trauma surgery perspective and just pending dc plan and disposition as she's had multiple falls at home. Most likely EFC. Wt Readings from Last 3 Encounters:   05/25/23 143 lb 4.8 oz (65 kg)   05/15/23 147 lb (66.7 kg)   04/13/23 162 lb 11.2 oz (73.8 kg)     Temp Readings from Last 3 Encounters:   05/25/23 97.7 °F (36.5 °C) (Oral)   05/15/23 97.9 °F (36.6 °C) (Oral)   04/13/23 97.9 °F (36.6 °C) (Oral)     BP Readings from Last 3 Encounters:   05/25/23 131/71   05/15/23 (!) 159/80   04/13/23 133/85     Pulse Readings from Last 3 Encounters:   05/25/23 82   05/15/23 79   04/13/23 79       24 HR INTAKE/OUTPUT :   Intake/Output Summary (Last 24 hours) at 5/25/2023 1012  Last data filed at 5/25/2023 0939  Gross per 24 hour   Intake 4021.97 ml   Output 150 ml   Net 3871.97 ml       ADULT DIET; Dysphagia - Soft and Bite Sized    OBJECTIVE  CURRENT VITALS /71   Pulse 82   Temp 97.7 °F (36.5 °C) (Oral)   Resp 16   Ht 5' 7\" (1.702 m)   Wt 143 lb 4.8 oz (65 kg)   SpO2 93%   BMI 22.44 kg/m²   GENERAL: Drowsy but awakens easily,  no acute distress, pleasant and cooperative with exam, confused   HEENT: Normocephalic, no pain with movement. Right periorbital and mandibular ecchymosis and edema. Some TTP to right inferolateral orbital rim  NEURO: Alert, follows commands, moves all four extremities  HEART: Tachycardic rate 106 and reg rhythm. Distal pulses intact. LUNGS/CHEST WALL: Lungs are clear to auscultation bilaterally with no wheezes, rales, rhonchi. No respiratory distress or increased work of breathing. No chest wall tenderness to palpation. ABDOMEN: Abdomen soft, nondistended, with no tenderness to palpation. No guarding or peritoneal signs. Bowel sounds normal active  EXTREMITIES: No cyanosis or edema. No extremity tenderness to palpation. Range of motion intact.  Good movement with
education & training, Safety education & training, Therapeutic activities  General Plan:  (6x T)    Patient Education  Patient Education: Plan of Care, Home Exercise Program    Goals:  Patient Goals : go home  Short Term Goals  Time Frame for Short Term Goals: at discharge  Short Term Goal 1: Pt to be Mod I for supine <> sit to get in/out of bed  Short Term Goal 2: Pt to be Mod I for sit <> stand to get up to ambulate  Short Term Goal 3: Pt to ambulate > 80 ft with RW with SBA for household and short community distances  Short Term Goal 4: Pt to negotiate 1 step with walker and CGA for home access  Long Term Goals  Time Frame for Long Term Goals : not set due to short ELOS    Following session, patient left in safe position with all fall risk precautions in place. Katlin Scott.  Verner Kasal, [place] [address]
home  Short Term Goals  Time Frame for Short Term Goals: at discharge  Short Term Goal 1: Pt to be Mod I for supine <> sit to get in/out of bed  Short Term Goal 2: Pt to be Mod I for sit <> stand to get up to ambulate  Short Term Goal 3: Pt to ambulate > 80 ft with RW with SBA for household and short community distances  Short Term Goal 4: Pt to negotiate 1 step with walker and CGA for home access  Long Term Goals  Time Frame for Long Term Goals : not set due to short ELOS    Following session, patient left in safe position with all fall risk precautions in place. Briana Chase.  Nestor Bey, [place] [address]
professionals  Documenting clinical information in the EHR  Independent interpretation of results and communicating the results to patient and care team  This includes a direct physical exam as well as all the other encounter activities described above. Time may be discontiguous. Time does not include procedures. Please see our orders that were directed and approved by me if there are any new ones for the updated patient care plan. Above discussed and I agree with documentation and orders placed by Leila Kim PA    See any additional comments if needed below for any other updated orders and plans. -- Repeat CT no change. Stable from a neurosurgical standpoint. Outpatient follow-up with repeat CT head in 4 weeks. Neurovascular checks. PT/OT. Dysphagia diet. Precertification needed for ECF. Continue current care.     Electronically signed by Sharyle Hurst, MD on 5/24/23 at 3:39 PM EDT

## 2023-05-26 NOTE — CARE COORDINATION
5/26/23, 8:54 AM EDT    DISCHARGE PLANNING EVALUATION    Called [facility] and made them aware Cami Montanez was not discharged yesterday. 5/26/23, 11:41 AM EDT    Patient goals/plan/ treatment preferences discussed by  and . Patient goals/plan/ treatment preferences reviewed with patient/ family. Patient/ family verbalize understanding of discharge plan and are in agreement with goal/plan/treatment preferences. Understanding was demonstrated using the teach back method. AVS provided by RN at time of discharge, which includes all necessary medical information pertaining to the patients current course of illness, treatment, post-discharge goals of care, and treatment preferences. Services At/After Discharge: Home Health, Aide services, Nursing service, OT, and PT       IMM Letter  IMM Letter given to Patient/Family/Significant other/Guardian/POA/by[de-identified] CM  IMM Letter date given[de-identified] 05/26/23  IMM Letter time given[de-identified] Jakub Gallagher will be discharged to home today with her spouse. She will have new [facility]  for RN, PT, OT, aid and RYLIE Dennis at [address] and made her aware of discharge. Cami Labor and spouse Ed are agreeable to discharge plan.

## 2023-05-27 NOTE — DISCHARGE SUMMARY
Ventricular Rate 122 BPM    Atrial Rate 122 BPM    P-R Interval 154 ms    QRS Duration 76 ms    Q-T Interval 316 ms    QTc Calculation (Bazett) 450 ms    P Axis 22 degrees    R Axis 68 degrees    T Axis -22 degrees   Potassium    Collection Time: 05/24/23  8:17 AM   Result Value Ref Range    Potassium 3.6 3.5 - 5.2 meq/L       Discharge condition: Stable  Disposition: Home with home health       Electronically signed by Nico Brown PA-C on 5/26/2023 at 8:21 PM
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
## 2023-07-19 ENCOUNTER — TELEPHONE (OUTPATIENT)
Dept: CARDIOLOGY CLINIC | Age: 83
End: 2023-07-19

## 2023-07-19 NOTE — TELEPHONE ENCOUNTER
Ed spouse calling to schedule a NP appt with [facility], wants a 2nd opinion, her previous heart doctor is Rosa Isela Enriquez, [facility]. Dx= Bp drops and passes out/falls. Please call Ed to get a NP appt with roman

## 2023-08-22 ENCOUNTER — APPOINTMENT (OUTPATIENT)
Dept: GENERAL RADIOLOGY | Age: 83
End: 2023-08-22
Payer: MEDICARE

## 2023-08-22 ENCOUNTER — HOSPITAL ENCOUNTER (EMERGENCY)
Age: 83
Discharge: HOME | End: 2023-08-22
Payer: MEDICARE

## 2023-08-22 ENCOUNTER — APPOINTMENT (OUTPATIENT)
Dept: CT IMAGING | Age: 83
End: 2023-08-22
Payer: MEDICARE

## 2023-08-22 VITALS
DIASTOLIC BLOOD PRESSURE: 98 MMHG | HEART RATE: 76 BPM | OXYGEN SATURATION: 98 % | WEIGHT: 145 LBS | RESPIRATION RATE: 17 BRPM | BODY MASS INDEX: 22.71 KG/M2 | TEMPERATURE: 98.4 F | SYSTOLIC BLOOD PRESSURE: 148 MMHG

## 2023-08-22 DIAGNOSIS — N39.0 URINARY TRACT INFECTION WITHOUT HEMATURIA, SITE UNSPECIFIED: ICD-10-CM
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
## 2023-08-22 DIAGNOSIS — S42.001A CLOSED NONDISPLACED FRACTURE OF RIGHT CLAVICLE, UNSPECIFIED PART OF CLAVICLE, INITIAL ENCOUNTER: ICD-10-CM

## 2023-08-22 LAB
ANION GAP SERPL CALC-SCNC: 9 MEQ/L (ref 8–16)
BACTERIA URNS QL MICRO: ABNORMAL /HPF
BASOPHILS ABSOLUTE: 0 THOU/MM3 (ref 0–0.1)
BASOPHILS NFR BLD AUTO: 0.4 %
BILIRUB UR QL STRIP.AUTO: NEGATIVE
BUN SERPL-MCNC: 21 MG/DL (ref 7–22)
CALCIUM SERPL-MCNC: 9.1 MG/DL (ref 8.5–10.5)
CASTS #/AREA URNS LPF: ABNORMAL /LPF
CASTS 2: ABNORMAL /LPF
CHARACTER UR: ABNORMAL
CHLORIDE SERPL-SCNC: 102 MEQ/L (ref 98–111)
CO2 SERPL-SCNC: 29 MEQ/L (ref 23–33)
COLOR: YELLOW
CREAT SERPL-MCNC: 0.9 MG/DL (ref 0.4–1.2)
CRYSTALS URNS MICRO: ABNORMAL
DEPRECATED RDW RBC AUTO: 51.8 FL (ref 35–45)
EOSINOPHIL NFR BLD AUTO: 1.4 %
EOSINOPHILS ABSOLUTE: 0.1 THOU/MM3 (ref 0–0.4)
EPITHELIAL CELLS, UA: ABNORMAL /HPF
ERYTHROCYTE [DISTWIDTH] IN BLOOD BY AUTOMATED COUNT: 14.8 % (ref 11.5–14.5)
GFR SERPL CREATININE-BSD FRML MDRD: > 60 ML/MIN/1.73M2
GLUCOSE SERPL-MCNC: 175 MG/DL (ref 70–108)
GLUCOSE UR QL STRIP.AUTO: NEGATIVE MG/DL
HCT VFR BLD AUTO: 35.4 % (ref 37–47)
HGB BLD-MCNC: 11.1 GM/DL (ref 12–16)
HGB UR QL STRIP.AUTO: NEGATIVE
IMM GRANULOCYTES # BLD AUTO: 0.02 THOU/MM3 (ref 0–0.07)
IMM GRANULOCYTES NFR BLD AUTO: 0.3 %
KETONES UR QL STRIP.AUTO: NEGATIVE
LYMPHOCYTES ABSOLUTE: 1.2 THOU/MM3 (ref 1–4.8)
LYMPHOCYTES NFR BLD AUTO: 17.2 %
MAGNESIUM SERPL-MCNC: 1.9 MG/DL (ref 1.6–2.4)
MCH RBC QN AUTO: 29.8 PG (ref 26–33)
MCHC RBC AUTO-ENTMCNC: 31.4 GM/DL (ref 32.2–35.5)
MCV RBC AUTO: 94.9 FL (ref 81–99)
MISCELLANEOUS 2: ABNORMAL
MONOCYTES ABSOLUTE: 0.5 THOU/MM3 (ref 0.4–1.3)
MONOCYTES NFR BLD AUTO: 7.3 %
NEUTROPHILS NFR BLD AUTO: 73.4 %
NITRITE UR QL STRIP: NEGATIVE
NRBC BLD AUTO-RTO: 0 /100 WBC
OSMOLALITY SERPL CALC.SUM OF ELEC: 286.6 MOSMOL/KG (ref 275–300)
PH UR STRIP.AUTO: 7.5 [PH] (ref 5–9)
PLATELET # BLD AUTO: 121 THOU/MM3 (ref 130–400)
PMV BLD AUTO: 11.2 FL (ref 9.4–12.4)
POTASSIUM SERPL-SCNC: 3.8 MEQ/L (ref 3.5–5.2)
PROCALCITONIN SERPL IA-MCNC: 0.08 NG/ML (ref 0.01–0.09)
PROT UR STRIP.AUTO-MCNC: ABNORMAL MG/DL
RBC # BLD AUTO: 3.73 MILL/MM3 (ref 4.2–5.4)
RBC URINE: ABNORMAL /HPF
RENAL EPI CELLS #/AREA URNS HPF: ABNORMAL /[HPF]
SEGMENTED NEUTROPHILS ABSOLUTE COUNT: 5.2 THOU/MM3 (ref 1.8–7.7)
SODIUM SERPL-SCNC: 140 MEQ/L (ref 135–145)
SP GR UR REFRACT.AUTO: 1.02 (ref 1–1.03)
TROPONIN, HIGH SENSITIVITY: 26 NG/L (ref 0–12)
UROBILINOGEN, URINE: 1 EU/DL (ref 0–1)
WBC # BLD AUTO: 7.1 THOU/MM3 (ref 4.8–10.8)
WBC #/AREA URNS HPF: ABNORMAL /HPF
WBC #/AREA URNS HPF: ABNORMAL /[HPF]
YEAST LIKE FUNGI URNS QL MICRO: ABNORMAL

## 2023-08-22 PROCEDURE — 85025 COMPLETE CBC W/AUTO DIFF WBC: CPT

## 2023-08-22 PROCEDURE — 80048 BASIC METABOLIC PNL TOTAL CA: CPT

## 2023-08-22 PROCEDURE — 99285 EMERGENCY DEPT VISIT HI MDM: CPT

## 2023-08-22 PROCEDURE — 70450 CT HEAD/BRAIN W/O DYE: CPT

## 2023-08-22 PROCEDURE — 93005 ELECTROCARDIOGRAM TRACING: CPT | Performed by: PHYSICIAN ASSISTANT

## 2023-08-22 PROCEDURE — 83735 ASSAY OF MAGNESIUM: CPT

## 2023-08-22 PROCEDURE — 84145 PROCALCITONIN (PCT): CPT

## 2023-08-22 PROCEDURE — 72125 CT NECK SPINE W/O DYE: CPT

## 2023-08-22 PROCEDURE — 84484 ASSAY OF TROPONIN QUANT: CPT

## 2023-08-22 PROCEDURE — 36415 COLL VENOUS BLD VENIPUNCTURE: CPT

## 2023-08-22 PROCEDURE — 81001 URINALYSIS AUTO W/SCOPE: CPT

## 2023-08-22 PROCEDURE — 72170 X-RAY EXAM OF PELVIS: CPT

## 2023-08-22 PROCEDURE — 73030 X-RAY EXAM OF SHOULDER: CPT

## 2023-08-22 PROCEDURE — 71046 X-RAY EXAM CHEST 2 VIEWS: CPT

## 2023-08-22 RX ORDER — CEFDINIR 300 MG/1
300 CAPSULE ORAL 2 TIMES DAILY
Qty: 14 CAPSULE | Refills: 0 | Status: SHIPPED | OUTPATIENT
Start: 2023-08-22 | End: 2023-08-29

## 2023-08-22 ASSESSMENT — PAIN - FUNCTIONAL ASSESSMENT: PAIN_FUNCTIONAL_ASSESSMENT: NONE - DENIES PAIN

## 2023-08-22 NOTE — ED TRIAGE NOTES
Presents to ED via EMS from home with  with complaints of having frequent falls. Pt has a history of dementia and is confused to place and time at baseline.  states pt's most recent fall was this morning in which she hit her head. States pt did not lose LOC and pt does not take a blood thinner.

## 2023-08-22 NOTE — ED PROVIDER NOTES
[facility] EMERGENCY DEPT      EMERGENCY MEDICINE     Pt Name: Alfredo Polanco  MRN: [id number]  [facility] 1940  Date of evaluation: 8/22/2023  Provider: MICHELE Almanza    CHIEF COMPLAINT       Chief Complaint   Patient presents with    Altered Mental Status    Fall     HISTORY OF PRESENT ILLNESS   Alfredo Polanco is a pleasant 79 y.o. female who presents to the emergency department from from home, by private vehicle for evaluation of falls. The patient states his fall multiple times. She states that she was bent over trying to  partially grown max bear. She is alert and oriented x2. She complains of pain in the left side of her head. She does have a bruise on the right side of her head she fell by the home health aide this morning called 911 for her to come to emergency room to get evaluated for the falls. 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 91XA    Scalp laceration S01. 01XA    SAH (subarachnoid hemorrhage) ([facility]) I60.9    Traumatic intracerebral hemorrhage with loss of consciousness ([address]) H19.451Z     SURGICAL HISTORY       Past Surgical History:   Procedure Laterality Date    EYE SURGERY Bilateral     corneal transplants     FACIAL SURGERY N/A 3/26/2023    FACIAL LACERATION REPAIR performed by [facility]err[facility] at [facility]         CURRENT MEDICATIONS       Discharge Medication List as of 8/22/2023  2:57 PM        CONTINUE these medications which have NOT CHANGED    Details   carbidopa-levodopa (SINEMET)  MG per tablet Take 1.5 tablets by mouth 3 times daily, Disp-135 tablet, R-3Normal      potassium chloride (MICRO-K) 10 MEQ created using voice recognition software. It may contain minor errors which are inherent in voice recognition technology. **      Final report electronically signed by Dr. Barbara Rogers on 8/22/2023 1:27 PM      XR CHEST (2 VW)   Final Result   1. Borderline heart size. A vascular stent valve is present in the heart, unchanged from prior. 2. No acute infiltrates or effusions are seen. 3. There are a few old compression fractures of the mid and lower thoracic spine            **This report has been created using voice recognition software. It may contain minor errors which are inherent in voice recognition technology. **      Final report electronically signed by Dr. Armin Larson on 8/22/2023 1:06 PM      XR SHOULDER RIGHT (MIN 2 VIEWS)   Final Result   Acute fracture distal most aspect right clavicle               **This report has been created using voice recognition software. It may contain minor errors which are inherent in voice recognition technology. **      Final report electronically signed by Dr. Armin Larson on 8/22/2023 1:07 PM      XR PELVIS (1-2 VIEWS)   Final Result      No pelvic fracture.       Final report electronically signed by Dr. Diana Flanagan on 8/22/2023 1:08 PM          LABS: (none if blank)  Labs Reviewed   CBC WITH AUTO DIFFERENTIAL - Abnormal; Notable for the following components:       Result Value    RBC 3.73 (*)     Hemoglobin 11.1 (*)     Hematocrit 35.4 (*)     MCHC 31.4 (*)     RDW-CV 14.8 (*)     RDW-SD 51.8 (*)     Platelets 201 (*)     All other components within normal limits   BASIC METABOLIC PANEL - Abnormal; Notable for the following components:    Glucose 175 (*)     All other components within normal limits   TROPONIN - Abnormal; Notable for the following components:    Troponin, High Sensitivity 26 (*)     All other components within normal limits   URINE WITH REFLEXED MICRO - Abnormal; Notable for the following components:    Protein, UA TRACE (*)     Leukocyte

## 2023-08-22 NOTE — ED NOTES
Patient resting in bed with family at bedside, denies any further needs or concerns at this time. Call light in reach. Will continue to monitor.       Shanique Baez RN  08/22/23 7933

## 2023-08-23 PROCEDURE — 93010 ELECTROCARDIOGRAM REPORT: CPT | Performed by: INTERNAL MEDICINE

## 2023-08-26 LAB
EKG ATRIAL RATE: 72 BPM
EKG P-R INTERVAL: 200 MS
EKG Q-T INTERVAL: 406 MS
EKG QRS DURATION: 82 MS
EKG QTC CALCULATION (BAZETT): 444 MS
EKG R AXIS: 15 DEGREES
EKG T AXIS: 14 DEGREES
EKG VENTRICULAR RATE: 72 BPM

## 2023-08-30 ENCOUNTER — TELEPHONE (OUTPATIENT)
Dept: CARDIOLOGY CLINIC | Age: 83
End: 2023-08-30
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
## 2023-09-07 ENCOUNTER — OFFICE VISIT (OUTPATIENT)
Dept: CARDIOLOGY CLINIC | Age: 83
End: 2023-09-07
Payer: MEDICARE

## 2023-09-07 VITALS
HEIGHT: 67 IN | WEIGHT: 125 LBS | SYSTOLIC BLOOD PRESSURE: 142 MMHG | DIASTOLIC BLOOD PRESSURE: 86 MMHG | HEART RATE: 68 BPM | BODY MASS INDEX: 19.62 KG/M2

## 2023-09-07 DIAGNOSIS — I10 PRIMARY HYPERTENSION: ICD-10-CM

## 2023-09-07 DIAGNOSIS — I95.0 IDIOPATHIC HYPOTENSION: Primary | ICD-10-CM

## 2023-09-07 DIAGNOSIS — Z95.2 HISTORY OF TRANSCATHETER AORTIC VALVE REPLACEMENT (TAVR): ICD-10-CM

## 2023-09-07 PROCEDURE — 3077F SYST BP >= 140 MM HG: CPT | Performed by: NUCLEAR MEDICINE

## 2023-09-07 PROCEDURE — 3079F DIAST BP 80-89 MM HG: CPT | Performed by: NUCLEAR MEDICINE

## 2023-09-07 PROCEDURE — 99204 OFFICE O/P NEW MOD 45 MIN: CPT | Performed by: NUCLEAR MEDICINE

## 2023-09-07 PROCEDURE — 1123F ACP DISCUSS/DSCN MKR DOCD: CPT | Performed by: NUCLEAR MEDICINE

## 2023-09-07 RX ORDER — DROXIDOPA 100 MG/1
100 CAPSULE ORAL
Qty: 90 CAPSULE | Refills: 5 | Status: SHIPPED | OUTPATIENT
Start: 2023-09-07

## 2023-09-07 RX ORDER — MIRTAZAPINE 7.5 MG/1
TABLET, FILM COATED ORAL
COMMUNITY
Start: 2023-09-01

## 2023-09-07 RX ORDER — FLUDROCORTISONE ACETATE 0.1 MG/1
TABLET ORAL 2 TIMES DAILY
COMMUNITY
Start: 2023-09-05 | End: 2023-09-07 | Stop reason: ALTCHOICE

## 2023-09-07 RX ORDER — MIDODRINE HYDROCHLORIDE 2.5 MG/1
2.5 TABLET ORAL 2 TIMES DAILY
COMMUNITY
End: 2023-09-07 | Stop reason: ALTCHOICE

## 2023-09-07 RX ORDER — MIDODRINE HYDROCHLORIDE 5 MG/1
5 TABLET ORAL 3 TIMES DAILY
Qty: 90 TABLET | Refills: 5 | Status: SHIPPED | OUTPATIENT
Start: 2023-09-07

## 2023-09-07 RX ORDER — MIDODRINE HYDROCHLORIDE 5 MG/1
5 TABLET ORAL 3 TIMES DAILY
COMMUNITY
End: 2023-09-07 | Stop reason: SDUPTHER

## 2023-09-07 RX ORDER — DROXIDOPA 100 MG/1
100 CAPSULE ORAL
COMMUNITY
End: 2023-09-07 | Stop reason: SDUPTHER

## 2023-09-07 RX ORDER — CLOPIDOGREL BISULFATE 75 MG/1
75 TABLET ORAL DAILY
COMMUNITY

## 2023-09-07 ASSESSMENT — ENCOUNTER SYMPTOMS
RECTAL PAIN: 0
ABDOMINAL PAIN: 0
SHORTNESS OF BREATH: 0
FACIAL SWELLING: 0
PHOTOPHOBIA: 0
CHEST TIGHTNESS: 0
NAUSEA: 0
DIARRHEA: 0
CONSTIPATION: 0
BACK PAIN: 1
ANAL BLEEDING: 0
COLOR CHANGE: 0
ABDOMINAL DISTENTION: 0
VOMITING: 0
BLOOD IN STOOL: 0

## 2023-09-07 NOTE — PROGRESS NOTES
fatigue. HENT:  Negative for drooling and facial swelling. Eyes:  Negative for photophobia. Respiratory:  Negative for chest tightness and shortness of breath. Cardiovascular:  Negative for palpitations. Gastrointestinal:  Negative for abdominal distention, abdominal pain, anal bleeding, blood in stool, constipation, diarrhea, nausea, rectal pain and vomiting. Endocrine: Negative for polyphagia. Genitourinary:  Negative for dysuria, frequency and urgency. Musculoskeletal:  Positive for arthralgias, back pain and gait problem. Negative for joint swelling, myalgias, neck pain and neck stiffness. Skin:  Negative for color change, pallor, rash and wound. Allergic/Immunologic: Negative for food allergies. Neurological:  Positive for dizziness and light-headedness. Negative for syncope. Psychiatric/Behavioral:  Negative for behavioral problems, confusion, decreased concentration and dysphoric mood. Objective:  Physical Exam  HENT:      Head: Normocephalic. Right Ear: Tympanic membrane normal.      Nose: Nose normal.      Mouth/Throat:      Mouth: Mucous membranes are moist.   Eyes:      Pupils: Pupils are equal, round, and reactive to light. Cardiovascular:      Rate and Rhythm: Normal rate and regular rhythm. Heart sounds: Murmur heard. No gallop. Pulmonary:      Effort: No respiratory distress. Breath sounds: No stridor. No wheezing, rhonchi or rales. Chest:      Chest wall: No tenderness. Abdominal:      General: There is no distension. Palpations: There is no mass. Tenderness: There is no abdominal tenderness. There is no right CVA tenderness, left CVA tenderness, guarding or rebound. Hernia: No hernia is present. Musculoskeletal:         General: No swelling, tenderness, deformity or signs of injury. Cervical back: Normal range of motion. Right lower leg: No edema. Left lower leg: No edema.    Skin:     Coloration: Skin is not

## 2023-09-12 ENCOUNTER — TELEPHONE (OUTPATIENT)
Dept: CARDIOLOGY CLINIC | Age: 83
End: 2023-09-12

## 2023-09-12 NOTE — TELEPHONE ENCOUNTER
Message from [facility] asking about Plan B for Northera. States they called pharmacy to check on Northera. Were told it was not covered and that it would be $5000. This RN called pharmacy to see if a PA is needed for Northera. Nothing received at this point. Pharmacy states yes, PA is needed. They will send through Cover My Meds. PA done. Status: PA Response - ApprovedCreated: September 12th, 2023 [phone]: September 12th, 2023    Notified pharmacy. Patient cost $142.44. Home health nurse Ro Juarez notified at [phone]. She is still at patient's house. Pt and  will plan to  medication.

## 2023-09-19 ENCOUNTER — APPOINTMENT (OUTPATIENT)
Dept: CT IMAGING | Age: 83
End: 2023-09-19
Payer: MEDICARE

## 2023-09-19 ENCOUNTER — HOSPITAL ENCOUNTER (OUTPATIENT)
Age: 83
Setting detail: OBSERVATION
Discharge: NURSING, REHAB OR LONG-TERM CARE | End: 2023-09-22
Attending: EMERGENCY MEDICINE
Payer: MEDICARE

## 2023-09-19 DIAGNOSIS — G20.A1 PARKINSON'S DISEASE: ICD-10-CM

## 2023-09-19 DIAGNOSIS — F03.90 DEMENTIA, UNSPECIFIED DEMENTIA SEVERITY, UNSPECIFIED DEMENTIA TYPE, UNSPECIFIED WHETHER BEHAVIORAL, PSYCHOTIC, OR MOOD DISTURBANCE OR ANXIETY (HCC): Primary | ICD-10-CM

## 2023-09-19 LAB
ALBUMIN SERPL BCG-MCNC: 3.7 G/DL (ref 3.5–5.1)
ALP SERPL-CCNC: 75 U/L (ref 38–126)
ALT SERPL W/O P-5'-P-CCNC: 12 U/L (ref 11–66)
ANION GAP SERPL CALC-SCNC: 10 MEQ/L (ref 8–16)
AST SERPL-CCNC: 19 U/L (ref 5–40)
BACTERIA: NORMAL
BASOPHILS ABSOLUTE: 0 THOU/MM3 (ref 0–0.1)
BASOPHILS NFR BLD AUTO: 0.4 %
BILIRUB SERPL-MCNC: 0.9 MG/DL (ref 0.3–1.2)
BILIRUB UR QL STRIP: NEGATIVE
BUN SERPL-MCNC: 18 MG/DL (ref 7–22)
CALCIUM SERPL-MCNC: 9.4 MG/DL (ref 8.5–10.5)
CASTS #/AREA URNS LPF: NORMAL /LPF
CASTS #/AREA URNS LPF: NORMAL /LPF
CHARACTER UR: CLEAR
CHARCOAL URNS QL MICRO: NORMAL
CHLORIDE SERPL-SCNC: 104 MEQ/L (ref 98–111)
CO2 SERPL-SCNC: 27 MEQ/L (ref 23–33)
COLOR UR: YELLOW
CREAT SERPL-MCNC: 0.8 MG/DL (ref 0.4–1.2)
CRYSTALS URNS QL MICRO: NORMAL
DEPRECATED RDW RBC AUTO: 51.8 FL (ref 35–45)
EOSINOPHIL NFR BLD AUTO: 1.2 %
EOSINOPHILS ABSOLUTE: 0.1 THOU/MM3 (ref 0–0.4)
EPITHELIAL CELLS, UA: NORMAL /HPF
ERYTHROCYTE [DISTWIDTH] IN BLOOD BY AUTOMATED COUNT: 14.5 % (ref 11.5–14.5)
FLUAV RNA RESP QL NAA+PROBE: NOT DETECTED
FLUBV RNA RESP QL NAA+PROBE: NOT DETECTED
GFR SERPL CREATININE-BSD FRML MDRD: > 60 ML/MIN/1.73M2
GLUCOSE SERPL-MCNC: 113 MG/DL (ref 70–108)
GLUCOSE UR QL STRIP.AUTO: NEGATIVE MG/DL
HCT VFR BLD AUTO: 38.5 % (ref 37–47)
HGB BLD-MCNC: 12.2 GM/DL (ref 12–16)
HGB UR QL STRIP.AUTO: NEGATIVE
IMM GRANULOCYTES # BLD AUTO: 0.02 THOU/MM3 (ref 0–0.07)
IMM GRANULOCYTES NFR BLD AUTO: 0.2 %
KETONES UR QL STRIP.AUTO: NEGATIVE
LEUKOCYTE ESTERASE UR QL STRIP.AUTO: NEGATIVE
LYMPHOCYTES ABSOLUTE: 1.9 THOU/MM3 (ref 1–4.8)
LYMPHOCYTES NFR BLD AUTO: 20.4 %
MCH RBC QN AUTO: 30.7 PG (ref 26–33)
MCHC RBC AUTO-ENTMCNC: 31.7 GM/DL (ref 32.2–35.5)
MCV RBC AUTO: 96.7 FL (ref 81–99)
MONOCYTES ABSOLUTE: 0.8 THOU/MM3 (ref 0.4–1.3)
MONOCYTES NFR BLD AUTO: 8.6 %
NEUTROPHILS NFR BLD AUTO: 69.2 %
NITRITE UR QL STRIP.AUTO: NEGATIVE
NRBC BLD AUTO-RTO: 0 /100 WBC
OSMOLALITY SERPL CALC.SUM OF ELEC: 284 MOSMOL/KG (ref 275–300)
PH UR STRIP.AUTO: 6.5 [PH] (ref 5–9)
PLATELET # BLD AUTO: 144 THOU/MM3 (ref 130–400)
PMV BLD AUTO: 11 FL (ref 9.4–12.4)
POTASSIUM SERPL-SCNC: 3.7 MEQ/L (ref 3.5–5.2)
PROT SERPL-MCNC: 6.8 G/DL (ref 6.1–8)
PROT UR STRIP.AUTO-MCNC: NEGATIVE MG/DL
RBC # BLD AUTO: 3.98 MILL/MM3 (ref 4.2–5.4)
RBC #/AREA URNS HPF: NORMAL /HPF
RENAL EPI CELLS #/AREA URNS HPF: NORMAL /[HPF]
SARS-COV-2 RNA RESP QL NAA+PROBE: NOT DETECTED
SEGMENTED NEUTROPHILS ABSOLUTE COUNT: 6.4 THOU/MM3 (ref 1.8–7.7)
SODIUM SERPL-SCNC: 141 MEQ/L (ref 135–145)
SPECIFIC GRAVITY UA: 1.02 (ref 1–1.03)
TSH SERPL DL<=0.005 MIU/L-ACNC: 3.57 UIU/ML (ref 0.4–4.2)
UROBILINOGEN, URINE: 1 EU/DL (ref 0–1)
WBC # BLD AUTO: 9.3 THOU/MM3 (ref 4.8–10.8)
WBC #/AREA URNS HPF: NORMAL /HPF
YEAST LIKE FUNGI URNS QL MICRO: NORMAL

## 2023-09-19 PROCEDURE — 70450 CT HEAD/BRAIN W/O DYE: CPT

## 2023-09-19 PROCEDURE — 36415 COLL VENOUS BLD VENIPUNCTURE: CPT

## 2023-09-19 PROCEDURE — 84443 ASSAY THYROID STIM HORMONE: CPT

## 2023-09-19 PROCEDURE — 81001 URINALYSIS AUTO W/SCOPE: CPT

## 2023-09-19 PROCEDURE — 85025 COMPLETE CBC W/AUTO DIFF WBC: CPT

## 2023-09-19 PROCEDURE — 99285 EMERGENCY DEPT VISIT HI MDM: CPT

## 2023-09-19 PROCEDURE — 80053 COMPREHEN METABOLIC PANEL: CPT

## 2023-09-19 PROCEDURE — 87636 SARSCOV2 & INF A&B AMP PRB: CPT

## 2023-09-19 ASSESSMENT — PAIN - FUNCTIONAL ASSESSMENT: PAIN_FUNCTIONAL_ASSESSMENT: NONE - DENIES PAIN

## 2023-09-19 NOTE — ED TRIAGE NOTES
Pt presents to the ER from home for care management. According to EMS, pt lives alone and has been having increased falls. Pt has hx of dementia and can no longer take care of herself. Pt denies pain. Pt is alert and confused, respirations even and unlabored.  VSS

## 2023-09-20 PROBLEM — F03.90 DEMENTIA (HCC): Status: ACTIVE | Noted: 2023-09-20

## 2023-09-20 PROBLEM — R29.6 FREQUENT FALLS: Status: ACTIVE | Noted: 2023-09-20

## 2023-09-20 PROCEDURE — 2580000003 HC RX 258: Performed by: NURSE PRACTITIONER

## 2023-09-20 PROCEDURE — 6360000002 HC RX W HCPCS: Performed by: NURSE PRACTITIONER

## 2023-09-20 PROCEDURE — 96372 THER/PROPH/DIAG INJ SC/IM: CPT

## 2023-09-20 PROCEDURE — G0378 HOSPITAL OBSERVATION PER HR: HCPCS

## 2023-09-20 PROCEDURE — 6370000000 HC RX 637 (ALT 250 FOR IP): Performed by: PHYSICIAN ASSISTANT

## 2023-09-20 PROCEDURE — 99222 1ST HOSP IP/OBS MODERATE 55: CPT | Performed by: NURSE PRACTITIONER

## 2023-09-20 RX ORDER — PANTOPRAZOLE SODIUM 40 MG/1
40 TABLET, DELAYED RELEASE ORAL
Status: DISCONTINUED | OUTPATIENT
Start: 2023-09-21 | End: 2023-09-22 | Stop reason: HOSPADM

## 2023-09-20 RX ORDER — MIRTAZAPINE 7.5 MG/1
7.5 TABLET, FILM COATED ORAL NIGHTLY
Status: DISCONTINUED | OUTPATIENT
Start: 2023-09-20 | End: 2023-09-22 | Stop reason: HOSPADM

## 2023-09-20 RX ORDER — ONDANSETRON 4 MG/1
4 TABLET, ORALLY DISINTEGRATING ORAL EVERY 8 HOURS PRN
Status: DISCONTINUED | OUTPATIENT
Start: 2023-09-20 | End: 2023-09-22 | Stop reason: HOSPADM

## 2023-09-20 RX ORDER — PREDNISOLONE ACETATE 10 MG/ML
1 SUSPENSION/ DROPS OPHTHALMIC EVERY 6 HOURS SCHEDULED
Status: DISCONTINUED | OUTPATIENT
Start: 2023-09-20 | End: 2023-09-22 | Stop reason: HOSPADM

## 2023-09-20 RX ORDER — LANOLIN ALCOHOL/MO/W.PET/CERES
6 CREAM (GRAM) TOPICAL NIGHTLY PRN
Status: DISCONTINUED | OUTPATIENT
Start: 2023-09-20 | End: 2023-09-22 | Stop reason: HOSPADM

## 2023-09-20 RX ORDER — CLOPIDOGREL BISULFATE 75 MG/1
75 TABLET ORAL DAILY
Status: DISCONTINUED | OUTPATIENT
Start: 2023-09-20 | End: 2023-09-22 | Stop reason: HOSPADM

## 2023-09-20 RX ORDER — ATORVASTATIN CALCIUM 40 MG/1
40 TABLET, FILM COATED ORAL NIGHTLY
Status: DISCONTINUED | OUTPATIENT
Start: 2023-09-20 | End: 2023-09-22 | Stop reason: HOSPADM

## 2023-09-20 RX ORDER — SODIUM CHLORIDE 0.9 % (FLUSH) 0.9 %
5-40 SYRINGE (ML) INJECTION PRN
Status: DISCONTINUED | OUTPATIENT
Start: 2023-09-20 | End: 2023-09-22 | Stop reason: HOSPADM

## 2023-09-20 RX ORDER — MIDODRINE HYDROCHLORIDE 5 MG/1
5 TABLET ORAL 3 TIMES DAILY
Status: DISCONTINUED | OUTPATIENT
Start: 2023-09-20 | End: 2023-09-22 | Stop reason: HOSPADM

## 2023-09-20 RX ORDER — ACETAMINOPHEN 500 MG
500 TABLET ORAL 2 TIMES DAILY
Status: DISCONTINUED | OUTPATIENT
Start: 2023-09-20 | End: 2023-09-22 | Stop reason: HOSPADM

## 2023-09-20 RX ORDER — ACETAMINOPHEN 325 MG/1
650 TABLET ORAL EVERY 6 HOURS PRN
Status: DISCONTINUED | OUTPATIENT
Start: 2023-09-20 | End: 2023-09-22 | Stop reason: HOSPADM

## 2023-09-20 RX ORDER — POLYVINYL ALCOHOL 14 MG/ML
1 SOLUTION/ DROPS OPHTHALMIC 2 TIMES DAILY
Status: DISCONTINUED | OUTPATIENT
Start: 2023-09-20 | End: 2023-09-20 | Stop reason: ALTCHOICE

## 2023-09-20 RX ORDER — QUETIAPINE FUMARATE 25 MG/1
25 TABLET, FILM COATED ORAL EVERY MORNING
Status: DISCONTINUED | OUTPATIENT
Start: 2023-09-20 | End: 2023-09-22 | Stop reason: HOSPADM

## 2023-09-20 RX ORDER — ENOXAPARIN SODIUM 100 MG/ML
40 INJECTION SUBCUTANEOUS DAILY
Status: DISCONTINUED | OUTPATIENT
Start: 2023-09-20 | End: 2023-09-22 | Stop reason: HOSPADM

## 2023-09-20 RX ORDER — ONDANSETRON 2 MG/ML
4 INJECTION INTRAMUSCULAR; INTRAVENOUS EVERY 6 HOURS PRN
Status: DISCONTINUED | OUTPATIENT
Start: 2023-09-20 | End: 2023-09-22 | Stop reason: HOSPADM

## 2023-09-20 RX ORDER — SODIUM CHLORIDE 0.9 % (FLUSH) 0.9 %
5-40 SYRINGE (ML) INJECTION EVERY 12 HOURS SCHEDULED
Status: DISCONTINUED | OUTPATIENT
Start: 2023-09-20 | End: 2023-09-22 | Stop reason: HOSPADM

## 2023-09-20 RX ORDER — MULTIVITAMIN WITH IRON
1 TABLET ORAL DAILY
Status: DISCONTINUED | OUTPATIENT
Start: 2023-09-20 | End: 2023-09-22 | Stop reason: HOSPADM

## 2023-09-20 RX ORDER — QUETIAPINE FUMARATE 25 MG/1
50 TABLET, FILM COATED ORAL NIGHTLY
Status: DISCONTINUED | OUTPATIENT
Start: 2023-09-20 | End: 2023-09-22 | Stop reason: HOSPADM

## 2023-09-20 RX ORDER — DROXIDOPA 100 MG/1
100 CAPSULE ORAL
Status: DISCONTINUED | OUTPATIENT
Start: 2023-09-20 | End: 2023-09-22 | Stop reason: HOSPADM

## 2023-09-20 RX ORDER — POLYETHYLENE GLYCOL 3350 17 G/17G
17 POWDER, FOR SOLUTION ORAL DAILY PRN
Status: DISCONTINUED | OUTPATIENT
Start: 2023-09-20 | End: 2023-09-22 | Stop reason: HOSPADM

## 2023-09-20 RX ORDER — ACETAMINOPHEN 650 MG/1
650 SUPPOSITORY RECTAL EVERY 6 HOURS PRN
Status: DISCONTINUED | OUTPATIENT
Start: 2023-09-20 | End: 2023-09-22 | Stop reason: HOSPADM

## 2023-09-20 RX ORDER — POTASSIUM CHLORIDE 750 MG/1
10 TABLET, FILM COATED, EXTENDED RELEASE ORAL DAILY
Status: DISCONTINUED | OUTPATIENT
Start: 2023-09-20 | End: 2023-09-22 | Stop reason: HOSPADM

## 2023-09-20 RX ORDER — SODIUM CHLORIDE 9 MG/ML
INJECTION, SOLUTION INTRAVENOUS PRN
Status: DISCONTINUED | OUTPATIENT
Start: 2023-09-20 | End: 2023-09-22 | Stop reason: HOSPADM

## 2023-09-20 RX ADMIN — CLOPIDOGREL BISULFATE 75 MG: 75 TABLET ORAL at 15:23

## 2023-09-20 RX ADMIN — SODIUM CHLORIDE, PRESERVATIVE FREE 10 ML: 5 INJECTION INTRAVENOUS at 21:46

## 2023-09-20 RX ADMIN — CARBIDOPA AND LEVODOPA 1.5 TABLET: 25; 100 TABLET ORAL at 21:43

## 2023-09-20 RX ADMIN — ATORVASTATIN CALCIUM 40 MG: 40 TABLET, FILM COATED ORAL at 21:43

## 2023-09-20 RX ADMIN — MIDODRINE HYDROCHLORIDE 5 MG: 5 TABLET ORAL at 18:45

## 2023-09-20 RX ADMIN — MIRTAZAPINE 7.5 MG: 7.5 TABLET ORAL at 21:44

## 2023-09-20 RX ADMIN — QUETIAPINE FUMARATE 50 MG: 25 TABLET ORAL at 21:43

## 2023-09-20 RX ADMIN — ACETAMINOPHEN 500 MG: 500 TABLET ORAL at 21:42

## 2023-09-20 RX ADMIN — DROXIDOPA 100 MG: 100 CAPSULE ORAL at 15:23

## 2023-09-20 RX ADMIN — CARBIDOPA AND LEVODOPA 1.5 TABLET: 25; 100 TABLET ORAL at 15:22

## 2023-09-20 RX ADMIN — PREDNISOLONE ACETATE 1 DROP: 10 SUSPENSION/ DROPS OPHTHALMIC at 18:45

## 2023-09-20 RX ADMIN — POLYETHYLENE GLYCOL, PROPYLENE GLYCOL 1 DROP: .4; .3 LIQUID OPHTHALMIC at 21:45

## 2023-09-20 RX ADMIN — SODIUM CHLORIDE, PRESERVATIVE FREE 10 ML: 5 INJECTION INTRAVENOUS at 08:10

## 2023-09-20 RX ADMIN — Medication 1 TABLET: at 15:23

## 2023-09-20 RX ADMIN — POLYETHYLENE GLYCOL, PROPYLENE GLYCOL 1 DROP: .4; .3 LIQUID OPHTHALMIC at 15:25

## 2023-09-20 RX ADMIN — ACETAMINOPHEN 500 MG: 500 TABLET ORAL at 15:23

## 2023-09-20 RX ADMIN — POTASSIUM CHLORIDE 10 MEQ: 750 TABLET, EXTENDED RELEASE ORAL at 15:25

## 2023-09-20 RX ADMIN — PREDNISOLONE ACETATE 1 DROP: 10 SUSPENSION/ DROPS OPHTHALMIC at 15:22

## 2023-09-20 RX ADMIN — QUETIAPINE FUMARATE 25 MG: 25 TABLET ORAL at 15:25

## 2023-09-20 RX ADMIN — METOPROLOL TARTRATE 12.5 MG: 25 TABLET, FILM COATED ORAL at 15:23

## 2023-09-20 RX ADMIN — ENOXAPARIN SODIUM 40 MG: 100 INJECTION SUBCUTANEOUS at 08:10

## 2023-09-20 RX ADMIN — METOPROLOL TARTRATE 12.5 MG: 25 TABLET, FILM COATED ORAL at 21:44

## 2023-09-20 ASSESSMENT — PAIN SCALES - GENERAL: PAINLEVEL_OUTOF10: 0

## 2023-09-20 ASSESSMENT — PAIN - FUNCTIONAL ASSESSMENT: PAIN_FUNCTIONAL_ASSESSMENT: ACTIVITIES ARE NOT PREVENTED

## 2023-09-20 NOTE — ED PROVIDER NOTES
[facility]      CHIEF COMPLAINT       Chief Complaint   Patient presents with    Care Coordination       Nurses Notes reviewed and I agree except as noted in the HPI. HISTORY OF PRESENT ILLNESS    Jignesh Medico is a 79 y.o. female who presents with complaint of needing placement due to poor self-care/confusion due to history of dementia. Patient brought in by EMS apparently after family called for help. Normal insight obtained from patient's due to confusion at baseline. REVIEW OF SYSTEMS       PAST MEDICAL HISTORY    has a past medical history of Anti-phospholipid antibody syndrome ([address]), Cancer ([address]), and Memory disorder. SURGICAL HISTORY      has a past surgical history that includes Hysterectomy; Foot surgery; eye surgery (Bilateral); and Facial Surgery (N/A, 3/26/2023). CURRENT MEDICATIONS       Previous Medications    ACETAMINOPHEN (TYLENOL) 500 MG TABLET    Take 1 tablet by mouth 2 times daily    ATORVASTATIN (LIPITOR) 40 MG TABLET        CALCIUM CARBONATE-VITAMIN D 600-200 MG-UNIT TABS    Take by mouth daily    CARBIDOPA-LEVODOPA (SINEMET)  MG PER TABLET    Take 1.5 tablets by mouth 3 times daily    CLOPIDOGREL (PLAVIX) 75 MG TABLET    Take 1 tablet by mouth daily    CYCLOSPORINE (RESTASIS) 0.05 % OPHTHALMIC EMULSION    1 drop 2 times daily    DROXIDOPA (NORTHERA) 100 MG CAPS    Take 1 capsule by mouth three times daily    METOPROLOL TARTRATE (LOPRESSOR) 25 MG TABLET    Take 0.5 tablets by mouth 2 times daily    MIDODRINE (PROAMATINE) 5 MG TABLET    Take 1 tablet by mouth 3 times daily    MIRTAZAPINE (REMERON) 7.5 MG TABLET    take 1 tablet by mouth EVERY EVENING AT 6 PM TO HELP WITH SLEEP, ...  (REFER TO PRESCRIPTION NOTES).     MULTIVITAMIN (THERAGRAN) PER TABLET    Take 1 tablet by mouth daily    PANTOPRAZOLE (PROTONIX) 40 MG TABLET    Take 1 tablet by mouth every morning (before breakfast)    POTASSIUM CHLORIDE (MICRO-K) 10 MEQ EXTENDED RELEASE CAPSULE    Take 1
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
## 2023-09-20 NOTE — ED NOTES
This RN to pt room. Pt attempting to climb out of bed at this time.      Florencia Flor RN  09/20/23 0157
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
## 2023-09-20 NOTE — PROGRESS NOTES
Patient admitted to [address] [place] [address] from ED  No complaints upon arrival to the room. IV site free of s/s of infection or infiltration. Vital signs obtained. Assessment and data collection initiated. Oriented to room. Policies and procedures for 8A explained All questions answered with no further questions at this time. Fall prevention and safety brochure discussed with patient. 2 person skin check completed with [place]. Patient declines PCP notification. Patient declines family notification. Problem: Falls - Risk of:  Goal: Will remain free from falls  Will remain free from falls   Outcome: Met This Shift    Goal: Absence of physical injury  Absence of physical injury   Outcome: Met This Shift      Problem: Risk for Impaired Skin Integrity  Goal: Tissue integrity - skin and mucous membranes  Structural intactness and normal physiological function of skin and  mucous membranes.    Outcome: Met This Shift      Problem: Airway Clearance - Ineffective:  Goal: Ability to maintain a clear airway will improve  Ability to maintain a clear airway will improve   Outcome: Met This Shift

## 2023-09-20 NOTE — PLAN OF CARE
Problem: Discharge Planning  Goal: Discharge to home or other facility with appropriate resources  Outcome: Progressing       Consult received. Please see SW note dated 9/20.

## 2023-09-20 NOTE — ED NOTES
This RN to pt room. Pt found wondering around room. Pt escorted back to bed.       Pb Chong RN  09/19/23 2051

## 2023-09-20 NOTE — ED NOTES
Pt lying on cot. Respirations easy and unlabored. Call light within reach.        Latha Laguerre RN  09/19/23 2129       Latha Laguerre RN  09/19/23 2214

## 2023-09-20 NOTE — H&P
Hospitalist  History and Physical    Patient:  Uriel Jaeger  MRN: [id number]    CHIEF COMPLAINT:  falls    History Obtained From:  patient, electronic medical record  PCP: Clarice Palm MD    HISTORY OF PRESENT ILLNESS:   Aubrey Del Real is an 79year old octogenarian present to [facility] ER via EMS with chief complaint of confusion, and noted history of frequent falls. 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 Noted recent [facility] ER evaluation 8/22/2023 for fall with documented baseline orientation to self only with history of dementia. EMS reported they were called with complaint of confusion and fall. Patient is pleasant. Oriented to self only. Speech is clear. Noted abrasion on bridge of nose. No family present. Attempted to call Matteo Chavira  with no response. ER nursing identified that someone was present at bedside for first four hours of evaluation in the ER uncertain to whom this person was. Past Medical History:        Diagnosis Date    Anti-phospholipid antibody syndrome ([address]     Cancer ([facility])     uterine     Memory disorder        Past Surgical History:        Procedure Laterality Date    EYE SURGERY Bilateral     corneal transplants     FACIAL SURGERY N/A 3/26/2023    FACIAL LACERATION REPAIR performed by Maninder Reese MD at [facility] (CERVIX STATUS UNKNOWN)         Medications Prior to Admission:    Prior to Admission medications    Medication Sig Start Date End Date Taking?  Authorizing Provider   metoprolol tartrate (LOPRESSOR) 25 MG tablet Take 0.5 tablets by mouth 2 times daily 9/1/23   Historical Provider, MD   clopidogrel (PLAVIX) 75 MG tablet Take 1 tablet by mouth daily    Historical Provider, MD   mirtazapine

## 2023-09-20 NOTE — ED NOTES
Pt lying on cot. VSS. Respirations easy and unlabored. Call light within reach.        Tiki Reza RN  09/19/23 8093

## 2023-09-20 NOTE — CARE COORDINATION
Case Management Assessment  Initial Evaluation    Date/Time of Evaluation: 9/20/2023 3:14 PM  Assessment Completed by: Miri Monte RN    If patient is discharged prior to next notation, then this note serves as note for discharge by case management. Patient Name: Pauly Solis                   YOB: 1940  Diagnosis: Frequent falls [R29.6]  Dementia, unspecified dementia severity, unspecified dementia type, unspecified whether behavioral, psychotic, or mood disturbance or anxiety ([address]) [F03.90]                   Date / Time: 9/19/2023  6:52 PM  Location: [facility]     Patient Admission Status: Observation   Readmission Risk Low 0-14, Mod 15-19), High > 20: Readmission Risk Score: 15.7    Current PCP: Apple Lopez MD  PCP verified by CM? Yes    Chart Reviewed: Yes      History Provided by: Significant Other  Patient Orientation: Other (see comment) (Patient is confused, spoke with  Ed)    Patient Cognition: Dementia / Early Alzheimer's    Hospitalization in the last 30 days (Readmission):  No    If yes, Readmission Assessment in  Navigator will be completed.     Advance Directives:      Code Status: Full Code   Patient's Primary Decision Maker is: Legal Next of Kin      Discharge Planning:    Patient lives with: Spouse/Significant Other Type of Home: House  Primary Care Giver: Spouse  Patient Support Systems include: Spouse/Significant Other, Family Members   Current Financial resources: Medicare  Current community resources: ECF/Home Care  Current services prior to admission: Durable Medical Equipment            Current DME: Wheelchair            Type of Home Care services:  PT, OT, Nursing Services    ADLS  Prior functional level: Assistance with the following:, Bathing, Dressing, Toileting, Feeding, Housework, Shopping, Cooking, Mobility  Current functional level: Assistance with the following:, Bathing, Dressing, Toileting, Feeding, Cooking, Housework, Mobility, identified.      Tere Paz RN  Case Management Department

## 2023-09-20 NOTE — CARE COORDINATION
DISCHARGE PLANNING EVALUATION  9/20/23, 2:03 PM EDT    Reason for Referral: Home safety  Mental Status: Patient has dementia, spoke with  Ed  Decision Making:  Ed is making decisions at this time. Family/Social/Home Environment: Lives at home with , has a niece who appears to be supportive. Current Services including food security, transportation and housekeeping: Current [facility] for RN, PT and OT services. Current Equipment: [address]: [employer]  Concerns or Barriers to Discharge: Would benefit from therapy before returning to home  Post-acute [facility]) provider list was provided to patient. Patient was informed of their freedom to choose [facility] provider. Discussed and offered to show the patient the relevant [facility] Providers quality and resource use measures on Medicare Compare web site via computer based on patient's goals of care and treatment preferences. Questions regarding selection process were answered. Teach Back Method used with Ed regarding care plan and discharge planning  Patient's spouse verbalized understanding of the plan of care and contribute to goal setting. Patient goals, treatment preferences and discharge plan: Ed would like patient to discharge to [facility], as he does not feel that he can take care of her at this time. DEMETRIO spoke with Cristi Marcelino from [facility], she is familiar with patient and will follow her while here.      Electronically signed by LOIS Oconnor on 9/20/2023 at 2:03 PM

## 2023-09-20 NOTE — ED NOTES
ED to inpatient nurses report    Chief Complaint   Patient presents with    Care Coordination      Present to ED from home  LOC: alert to only name  Vital signs   Vitals:    09/19/23 2309 09/20/23 0012 09/20/23 0157 09/20/23 0208   BP: (!) 165/106 (!) 157/93 (!) 162/117 (!) 153/83   Pulse: 95 98 (!) 115 (!) 115   Resp: 13  15 14   Temp:       TempSrc:       SpO2:  96%  97%   Weight:       Height:          Oxygen Baseline room air    Current needs required none   LDAs:   Peripheral IV 09/20/23 Right Antecubital (Active)   Site Assessment Clean, dry & intact 09/20/23 0207   Line Status Flushed 09/20/23 0207   Line Care Connections checked and tightened 09/20/23 0207   Phlebitis Assessment No symptoms 09/20/23 0207   Infiltration Assessment 0 09/20/23 0207   Dressing Status Clean, dry & intact 09/20/23 0207   Dressing Type Transparent 09/20/23 0207     Mobility: Requires assistance * 2  Pending ED orders: none  Present condition: stable      C-SSRS    Swallow Screening    Preferred Language: [facility]     Electronically signed by Marla Woodruff RN on 9/20/2023 at 2:11 AM      Rebekah Pandey  09/20/23 7399

## 2023-09-21 ENCOUNTER — APPOINTMENT (OUTPATIENT)
Dept: GENERAL RADIOLOGY | Age: 83
End: 2023-09-21
Payer: MEDICARE

## 2023-09-21 PROBLEM — E43 SEVERE MALNUTRITION (HCC): Status: ACTIVE | Noted: 2023-09-21

## 2023-09-21 LAB
ARTERIAL PATENCY WRIST A: POSITIVE
BASE EXCESS BLDA CALC-SCNC: 2.2 MMOL/L (ref -2.5–2.5)
BDY SITE: NORMAL
COLLECTED BY:: NORMAL
GLUCOSE BLD-MCNC: 130 MG/DL (ref 70–108)
HCO3 BLDA-SCNC: 26 MMOL/L (ref 23–28)
PCO2 BLDA: 37 MMHG (ref 35–45)
PH BLDA: 7.45 [PH] (ref 7.35–7.45)
PO2 BLDA: 76 MMHG (ref 71–104)
SAO2 % BLDA: 96 %

## 2023-09-21 PROCEDURE — 74230 X-RAY XM SWLNG FUNCJ C+: CPT

## 2023-09-21 PROCEDURE — 6370000000 HC RX 637 (ALT 250 FOR IP): Performed by: PHYSICIAN ASSISTANT

## 2023-09-21 PROCEDURE — 97530 THERAPEUTIC ACTIVITIES: CPT

## 2023-09-21 PROCEDURE — G0378 HOSPITAL OBSERVATION PER HR: HCPCS

## 2023-09-21 PROCEDURE — 92610 EVALUATE SWALLOWING FUNCTION: CPT

## 2023-09-21 PROCEDURE — 82803 BLOOD GASES ANY COMBINATION: CPT

## 2023-09-21 PROCEDURE — 96361 HYDRATE IV INFUSION ADD-ON: CPT

## 2023-09-21 PROCEDURE — 97166 OT EVAL MOD COMPLEX 45 MIN: CPT

## 2023-09-21 PROCEDURE — 92611 MOTION FLUOROSCOPY/SWALLOW: CPT

## 2023-09-21 PROCEDURE — 36600 WITHDRAWAL OF ARTERIAL BLOOD: CPT

## 2023-09-21 PROCEDURE — 36415 COLL VENOUS BLD VENIPUNCTURE: CPT

## 2023-09-21 PROCEDURE — 2500000003 HC RX 250 WO HCPCS: Performed by: PHYSICIAN ASSISTANT

## 2023-09-21 PROCEDURE — 6360000002 HC RX W HCPCS: Performed by: NURSE PRACTITIONER

## 2023-09-21 PROCEDURE — 82947 ASSAY GLUCOSE BLOOD QUANT: CPT

## 2023-09-21 PROCEDURE — 97162 PT EVAL MOD COMPLEX 30 MIN: CPT

## 2023-09-21 PROCEDURE — 2580000003 HC RX 258: Performed by: NURSE PRACTITIONER

## 2023-09-21 PROCEDURE — 2580000003 HC RX 258: Performed by: PHYSICIAN ASSISTANT

## 2023-09-21 PROCEDURE — 96372 THER/PROPH/DIAG INJ SC/IM: CPT

## 2023-09-21 PROCEDURE — 99233 SBSQ HOSP IP/OBS HIGH 50: CPT | Performed by: PHYSICIAN ASSISTANT

## 2023-09-21 RX ORDER — DEXTROSE AND SODIUM CHLORIDE 5; .9 G/100ML; G/100ML
INJECTION, SOLUTION INTRAVENOUS CONTINUOUS
Status: DISCONTINUED | OUTPATIENT
Start: 2023-09-21 | End: 2023-09-22 | Stop reason: HOSPADM

## 2023-09-21 RX ORDER — METOPROLOL TARTRATE 5 MG/5ML
2.5 INJECTION INTRAVENOUS EVERY 12 HOURS
Status: DISCONTINUED | OUTPATIENT
Start: 2023-09-21 | End: 2023-09-22 | Stop reason: HOSPADM

## 2023-09-21 RX ADMIN — ACETAMINOPHEN 500 MG: 500 TABLET ORAL at 09:47

## 2023-09-21 RX ADMIN — PREDNISOLONE ACETATE 1 DROP: 10 SUSPENSION/ DROPS OPHTHALMIC at 23:06

## 2023-09-21 RX ADMIN — BARIUM SULFATE 20 ML: 400 SUSPENSION ORAL at 11:43

## 2023-09-21 RX ADMIN — DROXIDOPA 100 MG: 100 CAPSULE ORAL at 09:48

## 2023-09-21 RX ADMIN — DEXTROSE AND SODIUM CHLORIDE: 5; 900 INJECTION, SOLUTION INTRAVENOUS at 16:02

## 2023-09-21 RX ADMIN — PREDNISOLONE ACETATE 1 DROP: 10 SUSPENSION/ DROPS OPHTHALMIC at 06:34

## 2023-09-21 RX ADMIN — QUETIAPINE FUMARATE 25 MG: 25 TABLET ORAL at 09:48

## 2023-09-21 RX ADMIN — BARIUM SULFATE 50 ML: 0.81 POWDER, FOR SUSPENSION ORAL at 11:43

## 2023-09-21 RX ADMIN — CARBIDOPA AND LEVODOPA 1.5 TABLET: 25; 100 TABLET ORAL at 09:47

## 2023-09-21 RX ADMIN — CLOPIDOGREL BISULFATE 75 MG: 75 TABLET ORAL at 09:47

## 2023-09-21 RX ADMIN — METOPROLOL TARTRATE 12.5 MG: 25 TABLET, FILM COATED ORAL at 09:47

## 2023-09-21 RX ADMIN — POLYETHYLENE GLYCOL, PROPYLENE GLYCOL 1 DROP: .4; .3 LIQUID OPHTHALMIC at 19:56

## 2023-09-21 RX ADMIN — CARBIDOPA AND LEVODOPA 1.5 TABLET: 25; 100 TABLET ORAL at 19:56

## 2023-09-21 RX ADMIN — PREDNISOLONE ACETATE 1 DROP: 10 SUSPENSION/ DROPS OPHTHALMIC at 16:03

## 2023-09-21 RX ADMIN — QUETIAPINE FUMARATE 50 MG: 25 TABLET ORAL at 19:57

## 2023-09-21 RX ADMIN — POLYETHYLENE GLYCOL, PROPYLENE GLYCOL 1 DROP: .4; .3 LIQUID OPHTHALMIC at 09:54

## 2023-09-21 RX ADMIN — SODIUM CHLORIDE, PRESERVATIVE FREE 10 ML: 5 INJECTION INTRAVENOUS at 09:48

## 2023-09-21 RX ADMIN — MIRTAZAPINE 7.5 MG: 7.5 TABLET ORAL at 19:57

## 2023-09-21 RX ADMIN — MIDODRINE HYDROCHLORIDE 5 MG: 5 TABLET ORAL at 09:47

## 2023-09-21 RX ADMIN — BARIUM SULFATE 25 ML: 400 SUSPENSION ORAL at 11:44

## 2023-09-21 RX ADMIN — ENOXAPARIN SODIUM 40 MG: 100 INJECTION SUBCUTANEOUS at 09:48

## 2023-09-21 RX ADMIN — BARIUM SULFATE 10 ML: 400 PASTE ORAL at 11:42

## 2023-09-21 ASSESSMENT — PAIN SCALES - GENERAL
PAINLEVEL_OUTOF10: 0
PAINLEVEL_OUTOF10: 0

## 2023-09-21 NOTE — CARE COORDINATION
9/21/23, 2:38 PM EDT    DISCHARGE ON GOING [place] day: 0  Location: 8A-05/005-A Reason for admit: Frequent falls [R29.6]  Dementia, unspecified dementia severity, unspecified dementia type, unspecified whether behavioral, psychotic, or mood disturbance or anxiety ([address]) [F03.90]     Procedure:   9/21 MBS: Laryngeal penetration of thin, mildly thick and moderately thick barium with aspiration of thin and mildly thick barium. Additional recommendations from the speech therapist will follow. Consider PEG. Barriers to Discharge: POC pending following MBS. Keon Acosta PA-C to speak with niece POA to determine next steps manage nutritional needs vs hospice. PCP: Bret Seymour MD   %    Patient Goals/Plan/Treatment Preferences: See SW notes on APS and placement updates. Planning dc to [facility]. Spoke with PCP Dr Kev Del Toro office who faxed ACP docs to [facility]. ACP docs added to hard chart, attending notified, and spiritual care notified to transfer docs to electronic medical record. Update 1519: Family wants hospice. Consult placed by Keon Acosta PA-C. Primary RN and SW updated.

## 2023-09-21 NOTE — CARE COORDINATION
9/21/23, 10:32 AM EDT    DISCHARGE PLANNING EVALUATION    This SW did speak with Iza Campoverde from the facility and updated her on situation. She does report that they are able to accept Roselind Serum clinically, they just need to know if she will be skilled or private pay long-term. SW made her aware that therapy is to work with her today and we will catch up then. Iza Campoverde is going to speak with administration about keeping the sitter in her room (for patient's safety) and make an exception on pulling it. She will update SW when she has more information on this.

## 2023-09-21 NOTE — PROGRESS NOTES
Pt is an 83y. o. female in 8A-05. Please refer to ACP note for additional context, re: AD's.      09/21/23 1750   Encounter Summary   Encounter Overview/Reason  Advance Care Planning   Service Provided For: Patient not available   Referral/Consult From: Multi-disciplinary team   Last Encounter  09/21/23   Complexity of Encounter Low   Begin Time 1645   End Time  1650   Total Time Calculated 5 min   Advance Care Planning   Type   (Received patient-provided DNR and HCPOA forms)   Assessment/Intervention/Outcome   Assessment Unable to assess   Intervention Other (Comment)  (Received and reviewed patient-provided HCPOA and DNR forms)   Outcome Did not respond

## 2023-09-21 NOTE — PROGRESS NOTES
[facility]  OCCUPATIONAL THERAPY MISSED TREATMENT NOTE  [facility] MED SURG 8A  8A-05/005-A      Date: 2023  Patient Name: Miguelito Gurllon        CSN: [id number]   : 1940  (79 y.o.)  Gender: female                REASON FOR MISSED TREATMENT: Patient Off Floor for Testing pt off floor for MBS. Will check back in PM as time allows.

## 2023-09-21 NOTE — PROGRESS NOTES
[facility]  INPATIENT OCCUPATIONAL THERAPY  [facility] MED SURG 8A  EVALUATION    Time:   Time In: 5211  Time Out: 1447  Timed Code Treatment Minutes: 23 Minutes  Minutes: 32          Date: 2023  Patient Name: Zara Luis,   Gender: female      MRN: [id number]  : 1940  (79 y.o.)  Referring Practitioner: Shante Melendrez PA-C  Diagnosis: frequent falls  Additional Pertinent Hx: Marquez Curry is an 79year old octogenarian present to [facility] ER via EMS with chief complaint of confusion, and noted history of frequent falls. 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 Restrictions/Precautions:  Restrictions/Precautions: Up as Tolerated, Fall Risk  Position Activity Restriction  Other position/activity restrictions: hx of dementia, orthostatic hypotension    Subjective  Chart Reviewed: Yes, Orders, Progress Notes  Patient assessed for rehabilitation services?: Yes    Subjective: Pt in recliner on OT arrival with live sitter at bedside. Pt is pleasantly confused with  at bedside.  states \"This is all she does anymore is sleep\" \"she doesn't even do the dishes anymore. \"    Pain: pt does not verbalize pain, appears fearful with movement which can appear to sound like painful moans but unclear if pt is in pain when she moves due to impaired cognition     Vitals: Orthostatic Blood Pressure: When in standing pt becomes less responsive and stares off into space with forward and lateral lean to left side. Sitting: after returning to sitting from first standing attempt: 80/70. Eyes remain open. 2nd standing attempt: unable to obtain in standing due to BP cuff restarting as pt was becoming symptomatic.  Legs elevated immediately and BP read as 104/91 after a few tolerance      Assessment:  Assessment: Bita Queen is a 79 y. o.female that presents with above new performance deficits secondary to  frequent falls and dementia. Pt is requiring increased assistance for ADLs, functional mobility, ADL transfers compared to baseline level of function. Skilled OT services is warranted to improve above performance deficits and progress pt towards PLOF. Without OT pt is at risk for falls, further decline in functional abilities, increased caregiver burden, increased risk for medical complication as a result of reduce mobility and inability to return to prior level of living. Performance deficits / Impairments: Decreased functional mobility , Decreased strength, Decreased endurance, Decreased ADL status, Decreased safe awareness, Decreased balance  Prognosis: Poor  REQUIRES OT FOLLOW-UP: Yes  Decision Making: Medium Complexity    Treatment Initiated: Treatment and education initiated within context of evaluation. Evaluation time included review of current medical information, gathering information related to past medical, social and functional history, completion of standardized testing, formal and informal observation of tasks, assessment of data and development of plan of care and goals. Treatment time included skilled education and facilitation of tasks to increase safety and independence with ADL's for improved functional independence and quality of life. Discharge Recommendations:  [address] [place] [address] with OT    Patient Education:     Patient Education  Education Given To: Patient  Education Provided: Role of Therapy, Plan of Care  Education Provided Comments:  would benefit from education on progression of dementia disease process.   Education Method: Demonstration  Barriers to Learning: None  Education Outcome: Unable to demonstrate understanding, Unable to verbalize, Continued education needed    Equipment Recommendations:  Equipment Needed: No    Plan:  Times
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
## 2023-09-21 NOTE — PROGRESS NOTES
[facility]  SPEECH THERAPY  [facility]  Modified Barium Swallow    SLP Individual Minutes  Time In: 1130  Time Out: 1140  Minutes: 10  Timed Code Treatment Minutes: 0 Minutes       Date: 2023  Patient Name: Toby Orellana      CSN: [id number]   : 1940  (79 y.o.)  Gender: female   Referring Physician:  Serina Lee PA-C  Diagnosis: Frequent Falls   Precautions: Fall risk, aspiration precautions  History of Present Illness/Injury: Patient admitted to [facility] for above dx. Per chart review, Krystyna Vanessa is an 79year old octogenarian present to [facility] ER via EMS with chief complaint of confusion, and noted history of frequent falls. 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 Noted recent [facility] ER evaluation 2023 for fall with documented baseline orientation to self only with history of dementia. EMS reported they were called with complaint of confusion and fall. Patient is pleasant. Oriented to self only. Speech is clear. Noted abrasion on bridge of nose. No family present. Attempted to call Kristen Harding  with no response. ER nursing identified that someone was present at bedside for first four hours of evaluation in the ER uncertain to whom this person was. \"     MBS warranted to further evaluate pharyngeal integrity. Patient has a past medical history of Anti-phospholipid antibody syndrome ([address]), Cancer ([address]), and Memory disorder. Current Diet: NPO     Pain: No pain reported. SUBJECTIVE:  Patient arrived to fluoro suite by bed. Alert, cooperative, and pleasantly confused. Limited direction following d/t patient's impaired cognition.      OBJECTIVE:    Respiratory Status:  Room Air    Behavioral Observation:  Alert, Confused,

## 2023-09-21 NOTE — PROGRESS NOTES
[facility]  SPEECH THERAPY  [facility] MED SURG 8A  Clinical Swallow Evaluation      SLP Individual Minutes  Time In: 1027  Time Out: 7663  Minutes: 14  Timed Code Treatment Minutes: 0 Minutes       Date: 2023  Patient Name: Claudia Larson      CSN: [id number]   : 1940  (79 y.o.)  Gender: female   Referring Physician:  Emmett Denton PA-C    Diagnosis: Frequent falls    History of Present Illness/Injury: Patient admitted to [facility] for above dx. Per chart review, Deven Rahman is an 79year old octogenarian present to [facility] ER via EMS with chief complaint of confusion, and noted history of frequent falls. 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 Noted recent [facility] ER evaluation 2023 for fall with documented baseline orientation to self only with history of dementia. EMS reported they were called with complaint of confusion and fall. Patient is pleasant. Oriented to self only. Speech is clear. Noted abrasion on bridge of nose. No family present. Attempted to call Lexis Chambers  with no response. ER nursing identified that someone was present at bedside for first four hours of evaluation in the ER uncertain to whom this person was. \"    ST consulted for completion of CSE to further evaluate swallow integrity. Past Medical History:   Diagnosis Date    Anti-phospholipid antibody syndrome ([address])     Cancer ([address])     uterine     Memory disorder        SUBJECTIVE:  Spoke with GHASSAN Curtis for approval of CSE. Upon arrival, patient sitting upright in bed asleep. Awakened to call of name and sternal rub.  and live sitter present at bedside. Patient alert and pleasantly confused. OBJECTIVE:    Pain:  No pain reported.     Current Diet: Regular, thin
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
## 2023-09-21 NOTE — PLAN OF CARE
NATHAN, niece, Mike Cardona called me back and after discussing with her sister and patient's son, they are electing for hospice care at this time. Referral placed.

## 2023-09-21 NOTE — ACP (ADVANCE CARE PLANNING)
Advance Care Planning     Advance Care Planning Inpatient Note  Spiritual Care Department    Today's Date: 9/21/2023  Unit: [facility] MED SURG 8A    Received request from IDT Member. Upon review of chart and communication with care team, Spiritual Care will defer advance care planning with patient at this time. Daphne Jeffery received patient-provided documentation      Goals of ACP Conversation:  No conversation was held    [address]:       Primary Decision Maker: Umair Singh \"Ed\Gordy Hogue Spouse - [phone]    Primary Decision Maker: Pro Li - [phone]  Summary:  Verified [facility]    Advance Care Planning Documents (Patient Wishes):  Healthcare Power of /Advance Directive Appointment of [address]  Portable DNR Form     Assessment:  Pt is an 83y. o. female in 8A-05.  received a call indicating the existence of DNR and HCPOA forms, patient-provided, for review and inclusion into patient chart. Interventions:   received, reviewed and prepared for sending to medical records, a DNR form and HCPOA    Care Preferences Communicated:   No    Outcomes/Plan:  Copy of advance directive given to staff to scan into medical record.     Electronically signed by Gee Martin on 9/21/2023 at 5:55 PM

## 2023-09-21 NOTE — PROGRESS NOTES
[address]   INPATIENT PHYSICAL THERAPY  EVALUATION  [facility] SUR[facility] 8A - 8A-05/005-A    Time In: 7076  Time Out: 1102  Timed Code Treatment Minutes: 15 Minutes  Minutes: 25          Date: 2023  Patient Name: Latanya Calloway,  Gender:  female        MRN: [id number]  : 1940  (79 y.o.)      Referring Practitioner: Emmy Valadez PA-C  Diagnosis: frequent falls  Additional Pertinent Hx: Per EMR \" Latanya Calloway is a 79 y.o. female who presents with complaint of needing placement due to poor self-care/confusion due to history of dementia. Patient brought in by EMS apparently after family called for help. Normal insight obtained from patient's due to confusion at baseline. \"     Restrictions/Precautions:  Restrictions/Precautions: Up as Tolerated, Fall Risk  Position Activity Restriction  Other position/activity restrictions: hx of dementia    Subjective:  Chart Reviewed: Yes  Patient assessed for rehabilitation services?: Yes  Family / Caregiver Present: Yes ()  Subjective: OK to see pt per nursing. Pt in bed when PT arrived, agreeable to PT session, alert and then once in chair, was sleeping.  stepped out during session.     General:  Overall Orientation Status: Impaired  Orientation Level: Oriented to person, Disoriented to place, Disoriented to time, Disoriented to situation  Overall Cognitive Status: Exceptions  Vision: Within Functional Limits  Hearing: Within functional limits       Pain: R UE pain, not rated    Vitals: Vitals not assessed per clinical judgement, see nursing flowsheet    Social/Functional History:    Lives With: Spouse  Type of Home: House  Home Layout: One level  Home Access: Ramped entrance, Stairs to enter without rails  Entrance Stairs - Number of Steps: 1 step into kitchen  Home Equipment: taylor Ayoub, [address], [address] Toilet: Standard  Bathroom Equipment: Grab bars in shower       ADL Assistance: Needs assistance  Homemaking

## 2023-09-22 VITALS
HEIGHT: 67 IN | DIASTOLIC BLOOD PRESSURE: 96 MMHG | SYSTOLIC BLOOD PRESSURE: 137 MMHG | WEIGHT: 134.04 LBS | OXYGEN SATURATION: 100 % | HEART RATE: 90 BPM | TEMPERATURE: 99.2 F | BODY MASS INDEX: 21.04 KG/M2 | RESPIRATION RATE: 18 BRPM

## 2023-09-22 PROCEDURE — 2580000003 HC RX 258: Performed by: PHYSICIAN ASSISTANT

## 2023-09-22 PROCEDURE — G0378 HOSPITAL OBSERVATION PER HR: HCPCS

## 2023-09-22 PROCEDURE — 96361 HYDRATE IV INFUSION ADD-ON: CPT

## 2023-09-22 PROCEDURE — 2500000003 HC RX 250 WO HCPCS: Performed by: PHYSICIAN ASSISTANT

## 2023-09-22 PROCEDURE — 96372 THER/PROPH/DIAG INJ SC/IM: CPT

## 2023-09-22 PROCEDURE — 96374 THER/PROPH/DIAG INJ IV PUSH: CPT

## 2023-09-22 PROCEDURE — 97530 THERAPEUTIC ACTIVITIES: CPT

## 2023-09-22 PROCEDURE — 99239 HOSP IP/OBS DSCHRG MGMT >30: CPT | Performed by: NURSE PRACTITIONER

## 2023-09-22 PROCEDURE — 99232 SBSQ HOSP IP/OBS MODERATE 35: CPT | Performed by: NURSE PRACTITIONER

## 2023-09-22 PROCEDURE — 6370000000 HC RX 637 (ALT 250 FOR IP): Performed by: PHYSICIAN ASSISTANT

## 2023-09-22 PROCEDURE — 6360000002 HC RX W HCPCS: Performed by: NURSE PRACTITIONER

## 2023-09-22 PROCEDURE — 97535 SELF CARE MNGMENT TRAINING: CPT

## 2023-09-22 RX ORDER — PREDNISOLONE ACETATE 10 MG/ML
1 SUSPENSION/ DROPS OPHTHALMIC 4 TIMES DAILY
Qty: 1 EACH | Refills: 1 | DISCHARGE
Start: 2023-09-22

## 2023-09-22 RX ORDER — MIDODRINE HYDROCHLORIDE 5 MG/1
5 TABLET ORAL 3 TIMES DAILY
Qty: 90 TABLET | Refills: 0 | DISCHARGE
Start: 2023-09-22

## 2023-09-22 RX ORDER — DROXIDOPA 100 MG/1
100 CAPSULE ORAL
Qty: 90 CAPSULE | Refills: 0 | DISCHARGE
Start: 2023-09-22

## 2023-09-22 RX ORDER — QUETIAPINE FUMARATE 50 MG/1
50 TABLET, FILM COATED ORAL NIGHTLY
Qty: 30 TABLET | Refills: 0 | DISCHARGE
Start: 2023-09-22 | End: 2023-10-22

## 2023-09-22 RX ORDER — QUETIAPINE FUMARATE 25 MG/1
25 TABLET, FILM COATED ORAL EVERY MORNING
Qty: 30 TABLET | Refills: 0 | DISCHARGE
Start: 2023-09-23

## 2023-09-22 RX ORDER — ACETAMINOPHEN 500 MG
500 TABLET ORAL 2 TIMES DAILY
Qty: 120 TABLET | Refills: 3 | DISCHARGE
Start: 2023-09-22

## 2023-09-22 RX ORDER — MIRTAZAPINE 7.5 MG/1
7.5 TABLET, FILM COATED ORAL NIGHTLY
Qty: 30 TABLET | Refills: 3 | DISCHARGE
Start: 2023-09-22

## 2023-09-22 RX ADMIN — PREDNISOLONE ACETATE 1 DROP: 10 SUSPENSION/ DROPS OPHTHALMIC at 11:45

## 2023-09-22 RX ADMIN — METOPROLOL TARTRATE 2.5 MG: 5 INJECTION INTRAVENOUS at 03:41

## 2023-09-22 RX ADMIN — DEXTROSE AND SODIUM CHLORIDE: 5; 900 INJECTION, SOLUTION INTRAVENOUS at 03:49

## 2023-09-22 RX ADMIN — POLYETHYLENE GLYCOL, PROPYLENE GLYCOL 1 DROP: .4; .3 LIQUID OPHTHALMIC at 09:24

## 2023-09-22 RX ADMIN — ENOXAPARIN SODIUM 40 MG: 100 INJECTION SUBCUTANEOUS at 09:24

## 2023-09-22 ASSESSMENT — PAIN SCALES - GENERAL: PAINLEVEL_OUTOF10: 0
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
## 2023-09-22 NOTE — CARE COORDINATION
9/22/23, 9:14 AM EDT    DISCHARGE PLANNING EVALUATION    Left a message for Chanda with [facility] and updated her on patient either needing to admit long-term or hospice care (family is now considering this). Also asked for follow up on the sitter situation. Made them aware that medically, patient is ready for discharge today. 10:52 AM- This SW spoke with Sarah from Palliative care, she reports that Ed would like patient to admit to [address] [place] [address] under hospice care. SW did speak with Chavez Hazel from the facility, they are agreeable to this and should be able to admit her today. DEMETRIO will connect with [facility] about providing hospice care at facility, as well as Republic with [address] [place] [address] APS to update her. 11:11 AM- SW did fax transport forms to [facility], asked for a 4:00PM time, waiting on a confirmation. Updated RN manager at [address] [place] [address], they are able to follow for hospice care and will admit to their services tomorrow morning. Shireen Gaines with APS, will let her know a transport time when it is set up for sure.

## 2023-09-22 NOTE — PROGRESS NOTES
[address] THERAPY MISSED TREATMENT NOTE  STR MED SURG 8A      Date: 2023  Patient Name: Vilma Robbins        MRN: [id number]    : 1940  (79 y.o.)    REASON FOR MISSED TREATMENT:  Per chart review and discussion with RN Aamir Hawkins, patient and family have decided to transition to comfort care/hospice measures. No further ST interventions needed. ST to sign off.      Josh Patient MA CFY-SLP [id number]

## 2023-09-22 NOTE — CARE COORDINATION
9/22/23, 11:50 AM EDT    Patient goals/plan/ treatment preferences discussed by  and . Patient goals/plan/ treatment preferences reviewed with patient/ family. Patient/ family verbalize understanding of discharge plan and are in agreement with goal/plan/treatment preferences. Understanding was demonstrated using the teach back method. AVS provided by RN at time of discharge, which includes all necessary medical information pertaining to the patients current course of illness, treatment, post-discharge goals of care, and treatment preferences. Services At/[facility] Dis[facility]       IMM Letter  Observation Status Letter date given[de-identified] 09/20/23  Observation Status Letter time given[de-identified] 0201  Observation Status Letter given to Patient/Family/Significant other/Guardian/POA/by[de-identified] patient registration     Gisell Quintana will be discharged today to [address] [place] [address], where she will be admitted to hospice care. Transport is set up for 3:00PM  today.

## 2023-09-22 NOTE — PROGRESS NOTES
[address]  Occupational Therapy  Daily Note  Time:   Time In: 08  Time Out: 8786  Timed Code Treatment Minutes: 23 Minutes  Minutes: 23          Date: 2023  Patient Name: Jhon Rodrigues,   Gender: female      Room: [facility]005-A  MRN: [id number]  : 1940  (79 y.o.)  Referring Practitioner: Sarahi Ventura PA-C  Diagnosis: frequent falls  Additional Pertinent Hx: Varghese Mckeon is an 79year old octogenarian present to [facility] ER via EMS with chief complaint of confusion, and noted history of frequent falls. 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 Restrictions/Precautions:  Restrictions/Precautions: Up as Tolerated, Fall Risk  Position Activity Restriction  Other position/activity restrictions: hx of dementia, orthostatic hypotension     SUBJECTIVE: Pt laying in bed upon arrival, pt agreeable to OT session, RN gave verbal approval for session. Pt had live sitter in room. Pt demo'd increased cognition this date, and ability to follow through with multi step commands. Pt noted to have increased standing balance/safety. PAIN: 0/10:     Vitals: Nurse checked vitals prior to session    COGNITION: Impaired Memory    ADL:   Toileting: Maximum Assistance. For posterior rylie care while standing post bowel movement  Toilet Transfer: [address] and X [address]. From the EOB to the [facility], with LIN assisting STNA  . BALANCE:  Standing Balance: Contact Guard Assistance. With RW, and BUE support on the walker during toileting routine     BED MOBILITY:  Supine to Sit: Stand By Assistance with the [facility] elevated    TRANSFERS:  Sit to Stand:  Contact Guard Assistance. From the EOB  Stand to Sit: Contact Guard Assistance.  To the EOB    FUNCTIONAL

## 2023-09-23 ENCOUNTER — APPOINTMENT (OUTPATIENT)
Dept: CT IMAGING | Age: 83
End: 2023-09-23
Payer: MEDICARE

## 2023-09-23 ENCOUNTER — APPOINTMENT (OUTPATIENT)
Dept: GENERAL RADIOLOGY | Age: 83
End: 2023-09-23
Payer: MEDICARE

## 2023-09-23 ENCOUNTER — HOSPITAL ENCOUNTER (EMERGENCY)
Age: 83
Discharge: NURSING, REHAB OR LONG-TERM CARE | End: 2023-09-23
Attending: EMERGENCY MEDICINE
Payer: MEDICARE

## 2023-09-23 VITALS
SYSTOLIC BLOOD PRESSURE: 106 MMHG | HEART RATE: 86 BPM | RESPIRATION RATE: 18 BRPM | HEIGHT: 67 IN | WEIGHT: 134 LBS | OXYGEN SATURATION: 95 % | TEMPERATURE: 97.5 F | BODY MASS INDEX: 21.03 KG/M2 | DIASTOLIC BLOOD PRESSURE: 74 MMHG

## 2023-09-23 DIAGNOSIS — S01.81XA FACIAL LACERATION, INITIAL ENCOUNTER: ICD-10-CM
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
## 2023-09-23 DIAGNOSIS — S02.2XXA CLOSED FRACTURE OF NASAL BONE, INITIAL ENCOUNTER: ICD-10-CM

## 2023-09-23 LAB
ANION GAP SERPL CALC-SCNC: 10 MEQ/L (ref 8–16)
APTT PPP: 33.2 SECONDS (ref 22–38)
BASOPHILS ABSOLUTE: 0 THOU/MM3 (ref 0–0.1)
BASOPHILS NFR BLD AUTO: 0.4 %
BUN SERPL-MCNC: 23 MG/DL (ref 7–22)
CALCIUM SERPL-MCNC: 9.3 MG/DL (ref 8.5–10.5)
CHLORIDE SERPL-SCNC: 105 MEQ/L (ref 98–111)
CO2 SERPL-SCNC: 26 MEQ/L (ref 23–33)
CREAT SERPL-MCNC: 0.9 MG/DL (ref 0.4–1.2)
DEPRECATED RDW RBC AUTO: 50.6 FL (ref 35–45)
EOSINOPHIL NFR BLD AUTO: 0.4 %
EOSINOPHILS ABSOLUTE: 0 THOU/MM3 (ref 0–0.4)
ERYTHROCYTE [DISTWIDTH] IN BLOOD BY AUTOMATED COUNT: 14.2 % (ref 11.5–14.5)
GFR SERPL CREATININE-BSD FRML MDRD: > 60 ML/MIN/1.73M2
GLUCOSE SERPL-MCNC: 109 MG/DL (ref 70–108)
HCT VFR BLD AUTO: 37.9 % (ref 37–47)
HGB BLD-MCNC: 11.9 GM/DL (ref 12–16)
IMM GRANULOCYTES # BLD AUTO: 0.02 THOU/MM3 (ref 0–0.07)
IMM GRANULOCYTES NFR BLD AUTO: 0.3 %
INR PPP: 0.97 (ref 0.85–1.13)
LYMPHOCYTES ABSOLUTE: 1.2 THOU/MM3 (ref 1–4.8)
LYMPHOCYTES NFR BLD AUTO: 16.4 %
MCH RBC QN AUTO: 30.3 PG (ref 26–33)
MCHC RBC AUTO-ENTMCNC: 31.4 GM/DL (ref 32.2–35.5)
MCV RBC AUTO: 96.4 FL (ref 81–99)
MONOCYTES ABSOLUTE: 0.6 THOU/MM3 (ref 0.4–1.3)
MONOCYTES NFR BLD AUTO: 7.8 %
NEUTROPHILS NFR BLD AUTO: 74.7 %
NRBC BLD AUTO-RTO: 0 /100 WBC
OSMOLALITY SERPL CALC.SUM OF ELEC: 285.5 MOSMOL/KG (ref 275–300)
PLATELET # BLD AUTO: 142 THOU/MM3 (ref 130–400)
PMV BLD AUTO: 11.2 FL (ref 9.4–12.4)
POTASSIUM SERPL-SCNC: 3.6 MEQ/L (ref 3.5–5.2)
RBC # BLD AUTO: 3.93 MILL/MM3 (ref 4.2–5.4)
SEGMENTED NEUTROPHILS ABSOLUTE COUNT: 5.4 THOU/MM3 (ref 1.8–7.7)
SODIUM SERPL-SCNC: 141 MEQ/L (ref 135–145)
WBC # BLD AUTO: 7.2 THOU/MM3 (ref 4.8–10.8)

## 2023-09-23 PROCEDURE — 85730 THROMBOPLASTIN TIME PARTIAL: CPT

## 2023-09-23 PROCEDURE — 12013 RPR F/E/E/N/L/M 2.6-5.0 CM: CPT | Performed by: EMERGENCY MEDICINE

## 2023-09-23 PROCEDURE — 85610 PROTHROMBIN TIME: CPT

## 2023-09-23 PROCEDURE — 93005 ELECTROCARDIOGRAM TRACING: CPT | Performed by: EMERGENCY MEDICINE

## 2023-09-23 PROCEDURE — 80048 BASIC METABOLIC PNL TOTAL CA: CPT

## 2023-09-23 PROCEDURE — 6370000000 HC RX 637 (ALT 250 FOR IP): Performed by: EMERGENCY MEDICINE

## 2023-09-23 PROCEDURE — 99285 EMERGENCY DEPT VISIT HI MDM: CPT | Performed by: EMERGENCY MEDICINE

## 2023-09-23 PROCEDURE — 2500000003 HC RX 250 WO HCPCS: Performed by: EMERGENCY MEDICINE

## 2023-09-23 PROCEDURE — 70450 CT HEAD/BRAIN W/O DYE: CPT

## 2023-09-23 PROCEDURE — 85025 COMPLETE CBC W/AUTO DIFF WBC: CPT

## 2023-09-23 PROCEDURE — 71045 X-RAY EXAM CHEST 1 VIEW: CPT

## 2023-09-23 PROCEDURE — 36415 COLL VENOUS BLD VENIPUNCTURE: CPT

## 2023-09-23 PROCEDURE — 72125 CT NECK SPINE W/O DYE: CPT

## 2023-09-23 PROCEDURE — 70486 CT MAXILLOFACIAL W/O DYE: CPT

## 2023-09-23 RX ORDER — ACETAMINOPHEN 500 MG
500 TABLET ORAL
Status: DISCONTINUED | OUTPATIENT
Start: 2023-09-23 | End: 2023-09-24 | Stop reason: HOSPADM

## 2023-09-23 RX ORDER — LIDOCAINE HYDROCHLORIDE AND EPINEPHRINE 10; 10 MG/ML; UG/ML
20 INJECTION, SOLUTION INFILTRATION; PERINEURAL ONCE
Status: COMPLETED | OUTPATIENT
Start: 2023-09-23 | End: 2023-09-23

## 2023-09-23 RX ORDER — GINSENG 100 MG
CAPSULE ORAL ONCE
Status: COMPLETED | OUTPATIENT
Start: 2023-09-23 | End: 2023-09-23

## 2023-09-23 RX ADMIN — LIDOCAINE HYDROCHLORIDE AND EPINEPHRINE 20 ML: 10; 10 INJECTION, SOLUTION INFILTRATION; PERINEURAL at 20:53

## 2023-09-23 RX ADMIN — BACITRACIN: 500 OINTMENT TOPICAL at 21:14

## 2023-09-23 ASSESSMENT — PAIN - FUNCTIONAL ASSESSMENT: PAIN_FUNCTIONAL_ASSESSMENT: NONE - DENIES PAIN
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
## 2023-09-24 LAB
EKG ATRIAL RATE: 82 BPM
EKG P AXIS: 71 DEGREES
EKG P-R INTERVAL: 206 MS
EKG Q-T INTERVAL: 394 MS
EKG QRS DURATION: 78 MS
EKG QTC CALCULATION (BAZETT): 460 MS
EKG R AXIS: 22 DEGREES
EKG T AXIS: 69 DEGREES
EKG VENTRICULAR RATE: 82 BPM

## 2023-09-24 PROCEDURE — 93010 ELECTROCARDIOGRAM REPORT: CPT | Performed by: NUCLEAR MEDICINE

## 2023-09-24 NOTE — ED NOTES
Pt's niece at bedside and update on POC.       [address] [place] [address] , RN  09/23/23 1230

## 2023-09-24 NOTE — ED NOTES
Per Jm Stinson, APRN - CNP note 9/21/23     \"Moderate oral dysphagia/moderately severe pharyngeal dysphagia--strict n.p.o. status, per review of progress notes from 9/21 family is opting for hospice care after options was discussed with family regarding possible PEG tube\".            Basia Garvey RN  09/23/23 6160

## 2023-09-24 NOTE — ED NOTES
Report given to [address] [place] [address] at [facility].      Javier Sky RN  09/23/23 4290

## 2023-09-24 NOTE — DISCHARGE INSTRUCTIONS
You were seen here today for a fall. Make sure to apply bacitracin 3 times daily as prescribed. Make sure to have the sutures removed within the next 7 days. Return here if you develop worsening confusion, weakness in arms or legs, or difficulty speaking.

## 2023-09-24 NOTE — ED NOTES
Report received from Shira, [address]. Patient resting in bed. Respirations easy and unlabored. No distress noted. Call light within reach. Family at the bedside.       Gaye Iqbal RN  09/23/23 3980

## 2023-09-24 NOTE — ED NOTES
Dr. Kedar Li at bedside to repair lac to left side of face.       [address] [place] [address], RN  09/23/23 2053

## 2024-08-11 RX ORDER — FLUDROCORTISONE ACETATE 0.1 MG/1
TABLET ORAL DAILY
Qty: 14 TABLET | Refills: 0 | OUTPATIENT
Start: 2024-08-11

## 2024-08-11 RX ORDER — FERROUS SULFATE 325(65) MG
1 TABLET ORAL 2 TIMES DAILY WITH MEALS
Qty: 28 TABLET | OUTPATIENT
Start: 2024-08-11

## (undated) DEVICE — AGENT HEMSTAT W3XL4IN OXIDIZED REGENERATED CELOS ABSRB FOR

## (undated) DEVICE — AGENT HEMSTAT 3GM OXIDIZED REGENERATED CELOS ABSRB FOR CONT (ORDER MULTIPLES OF 5EA)